# Patient Record
Sex: MALE | Race: WHITE | HISPANIC OR LATINO | Employment: FULL TIME | URBAN - METROPOLITAN AREA
[De-identification: names, ages, dates, MRNs, and addresses within clinical notes are randomized per-mention and may not be internally consistent; named-entity substitution may affect disease eponyms.]

---

## 2017-02-21 ENCOUNTER — ALLSCRIPTS OFFICE VISIT (OUTPATIENT)
Dept: OTHER | Facility: OTHER | Age: 52
End: 2017-02-21

## 2017-03-09 ENCOUNTER — ALLSCRIPTS OFFICE VISIT (OUTPATIENT)
Dept: OTHER | Facility: OTHER | Age: 52
End: 2017-03-09

## 2017-04-26 ENCOUNTER — ALLSCRIPTS OFFICE VISIT (OUTPATIENT)
Dept: OTHER | Facility: OTHER | Age: 52
End: 2017-04-26

## 2017-04-26 DIAGNOSIS — E11.65 TYPE 2 DIABETES MELLITUS WITH HYPERGLYCEMIA (HCC): ICD-10-CM

## 2017-05-10 ENCOUNTER — LAB CONVERSION - ENCOUNTER (OUTPATIENT)
Dept: OTHER | Facility: OTHER | Age: 52
End: 2017-05-10

## 2017-05-10 LAB
CREATININE, RANDOM URINE (HISTORICAL): 215 MG/DL (ref 20–370)
HBA1C MFR BLD HPLC: 5.8 % OF TOTAL HGB
MAGNESIUM, UR (HISTORICAL): 1.4 MG/DL
MICROALBUMIN/CREATININE RATIO (HISTORICAL): 7 MCG/MG CREAT

## 2017-05-11 ENCOUNTER — GENERIC CONVERSION - ENCOUNTER (OUTPATIENT)
Dept: OTHER | Facility: OTHER | Age: 52
End: 2017-05-11

## 2017-05-31 ENCOUNTER — GENERIC CONVERSION - ENCOUNTER (OUTPATIENT)
Dept: OTHER | Facility: OTHER | Age: 52
End: 2017-05-31

## 2017-06-20 ENCOUNTER — ALLSCRIPTS OFFICE VISIT (OUTPATIENT)
Dept: OTHER | Facility: OTHER | Age: 52
End: 2017-06-20

## 2017-06-20 DIAGNOSIS — M25.561 PAIN IN RIGHT KNEE: ICD-10-CM

## 2017-06-22 ENCOUNTER — HOSPITAL ENCOUNTER (OUTPATIENT)
Dept: RADIOLOGY | Facility: HOSPITAL | Age: 52
Discharge: HOME | End: 2017-06-22
Attending: PHYSICIAN ASSISTANT
Payer: COMMERCIAL

## 2017-06-22 DIAGNOSIS — M25.561 PAIN IN RIGHT KNEE: ICD-10-CM

## 2017-06-22 PROCEDURE — 76882 US LMTD JT/FCL EVL NVASC XTR: CPT

## 2017-07-19 ENCOUNTER — ALLSCRIPTS OFFICE VISIT (OUTPATIENT)
Dept: OTHER | Facility: OTHER | Age: 52
End: 2017-07-19

## 2017-08-22 ENCOUNTER — ALLSCRIPTS OFFICE VISIT (OUTPATIENT)
Dept: OTHER | Facility: OTHER | Age: 52
End: 2017-08-22

## 2017-08-25 DIAGNOSIS — M25.861 OTHER SPECIFIED JOINT DISORDERS, RIGHT KNEE: ICD-10-CM

## 2017-08-28 ENCOUNTER — TRANSCRIBE ORDERS (OUTPATIENT)
Dept: ADMINISTRATIVE | Facility: HOSPITAL | Age: 52
End: 2017-08-28

## 2017-08-28 DIAGNOSIS — R22.41 MASS OF KNEE, RIGHT: Primary | ICD-10-CM

## 2017-09-11 ENCOUNTER — HOSPITAL ENCOUNTER (OUTPATIENT)
Dept: RADIOLOGY | Facility: HOSPITAL | Age: 52
Discharge: HOME | End: 2017-09-11
Attending: ORTHOPAEDIC SURGERY
Payer: COMMERCIAL

## 2017-09-11 DIAGNOSIS — M25.861 OTHER SPECIFIED JOINT DISORDERS, RIGHT KNEE: ICD-10-CM

## 2017-09-11 PROCEDURE — A9585 GADOBUTROL INJECTION: HCPCS | Performed by: ORTHOPAEDIC SURGERY

## 2017-09-11 PROCEDURE — 73723 MRI JOINT LWR EXTR W/O&W/DYE: CPT

## 2017-09-11 RX ADMIN — GADOBUTROL 10 ML: 604.72 INJECTION INTRAVENOUS at 15:47

## 2017-10-05 ENCOUNTER — ALLSCRIPTS OFFICE VISIT (OUTPATIENT)
Dept: OTHER | Facility: OTHER | Age: 52
End: 2017-10-05

## 2017-10-05 DIAGNOSIS — E78.5 HYPERLIPIDEMIA: ICD-10-CM

## 2017-10-05 DIAGNOSIS — E11.65 TYPE 2 DIABETES MELLITUS WITH HYPERGLYCEMIA (HCC): ICD-10-CM

## 2017-10-06 NOTE — PROGRESS NOTES
Assessment  1  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) (M19 049)   2  Trigger finger, unspecified finger, unspecified laterality (727 03) (M65 30)   3  Knee mass, right (719 66) (M25 861)    Plan  Controlled diabetes mellitus, Hyperlipidemia    · (1) BASIC METABOLIC PROFILE; Status:Active; Requested [id number];    · (1) HEMOGLOBIN A1C; Status:Active; Requested UMB:31VSO9426;    · (1) LIPID PANEL FASTING W DIRECT LDL REFLEX; Status:Active; Requested  CXY:69ILF8717;   OA (osteoarthritis) of finger    · Meloxicam 7 5 MG Oral Tablet; TAKE 1 TABLET TWICE DAILY WITH FOOD  Trigger finger, unspecified finger, unspecified laterality    · 1 - Pelon Guadarrama MD  (Orthopedic Surgery) Co-Management  *  Status: Active   Requested for: 88FJE5343  Care Summary provided  : Yes    Discussion/Summary    1  Osteoarthritis of finger b/lwith patient that he has both arthritis and trigger finger that exacerbate his painNaproxen to Meloxicam 7 5mg BID with food  Trigger fingerto follow up with hand specialist Dr Gloria Jacobs for possible injection vs surgical release, appreciate their recs  Knee massresults discussed with patient, advised to follow up orthopedics to further evaluate, appreciate their recs  The patient was counseled regarding instructions for management,-risk factor reductions,-patient and family education,-impressions,-risks and benefits of treatment options,-importance of compliance with treatment  Possible side effects of new medications were reviewed with the patient/guardian today  The treatment plan was reviewed with the patient/guardian  The patient/guardian understands and agrees with the treatment plan     Self Referrals: No      Chief Complaint  Patient complain of pain in both hands level 9/10would like the flu vac today      History of Present Illness  HPI: Patient is a pleasant 47 yo M presenting for follow up knee MRI and to discuss b/l hand stiffness and pain   Patient's hand pain and stiffness started in 2010, at which time he received steroid injection  He saw hand specialist Dr Monico Cain in March and was advised to get trigger finger release  He was unable to schedule surgery 2/2 issues with getting workman's compensation to cover the surgery and recovery  Patient is interested in repeating the steroid injection  His hand stiffness and pain is worse in the morning; sometimes wakes him up from pain  He takes Naproxen every few days for the pain  He notes the pain is there most of the time, but gets worse when his finger gets stuck  Review of Systems    Constitutional: no fever-and-no chills  Respiratory: no shortness of breath,-no cough,-no wheezing-and-no shortness of breath during exertion  Gastrointestinal: no abdominal pain,-no nausea,-no constipation-and-no diarrhea  Musculoskeletal: arthralgias-and-joint stiffness, but-as noted in HPI  Integumentary: no rashes-and-no skin lesions  Neurological: no headache,-no numbness,-no dizziness-and-no fainting  ROS reviewed  Active Problems  1  Aftercare following surgery (V58 89) (Z48 89)   2  Controlled diabetes mellitus (250 00) (E11 9)   3  Effusion of left knee (719 06) (M25 462)   4  Effusion of left prepatellar bursa (719 06) (M25 462)   5  Encounter for immunization (V03 89) (Z23)   6  Encounter for screening colonoscopy (V76 51) (Z12 11)   7  Hamstring strain, right, initial encounter   8  Hyperlipidemia (272 4) (E78 5)   9  Knee mass, right (719 66) (M25 861)   10  Localized primary osteoarthritis of lower leg, left (715 16) (M17 12)   11  Low back pain (724 2) (M54 5)   12  Lumbar radiculopathy (724 4) (M54 16)   13  Mouth lesion (528 9) (K13 70)   14  Need for Tdap vaccination (V06 1) (Z23)   15  Obesity (278 00) (E66 9)   16  Pain in left knee (719 46) (M25 562)   17  Painful knee (719 46) (M25 569)   18  Poison ivy dermatitis (692 6) (L23 7)   19  Posterior knee pain, right (719 46) (M25 561)   20   Seasonal allergies (477 9) (J30 2)   21  Severe sleep apnea (780 57) (G47 30)   22  Snoring (786 09) (R06 83)   23  Tear of lateral meniscus of knee, left, initial encounter   24  Tear of medial meniscus of left knee, subsequent encounter (V58 89,836 0) (S83 242D)   25  Trigger finger, unspecified finger, unspecified laterality (727 03) (M65 30)   26  Trigger index finger of left hand (727 03) (M65 322)   27  Trigger index finger of right hand (727 03) (U09 820)    Past Medical History  Active Problems And Past Medical History Reviewed: The active problems and past medical history were reviewed and updated today  Family History  Mother    1  Family history of diabetes mellitus (V18 0) (Z83 3)   2  Family history of kidney disease (V18 69) (Z84 1)  Father    3  Family history of diabetes mellitus (V18 0) (Z83 3)  Family History Reviewed: The family history was reviewed and updated today  Social History   · Denied: History of Drug use   · Full-time employment   · [employer]   ·    · Never a smoker   · Non-smoker (V49 89) (Z78 9)   · Denied: History of Sexual history   · Social alcohol use (Z78 9)  The social history was reviewed and updated today  Surgical History  1  History of Femur Repair   2  History of Hernia Repair   3  History of Knee Arthroscopy With Medial Meniscus Repair  Surgical History Reviewed: The surgical history was reviewed and updated today  Current Meds   1  Allegra CAPS; Therapy: (Recorded:08Ibf4631) to Recorded   2  Atorvastatin Calcium 20 MG Oral Tablet; take one tablet at bedtime; Therapy: 05MRQ8105 to (Evaluate:14Oct2017)  Requested for: 44Gxk9210; Last   Rx:52Sam1375 Ordered   3  Cyclobenzaprine HCl - 5 MG Oral Tablet; Take one tablet three times daily as needed for   pain; Therapy: 86FBF3381 to (Last Rx:17Aug2017)  Requested for: 17Aug2017 Ordered   4  Fluticasone Propionate 50 MCG/ACT Nasal Suspension; USE 2 SPRAYS IN EACH   NOSTRIL ONCE DAILY;    Therapy: 09WCA0251 to Recorded   5  MetFORMIN HCl - 500 MG Oral Tablet; take 1 tablet once daily with a meal;   Therapy: 29Nnr8473 to (Evaluate:12Jan2018)  Requested for: 11Aea6956; Last   Rx:53Rsf1818 Ordered   6  Naproxen 500 MG Oral Tablet; TAKE 1 TABLET EVERY 12 HOURS WITH FOOD; Therapy: 64IPG8306 to (96 063555)  Requested for: 29OPH7147; Last   Rx:09Ykw7551 Ordered    The medication list was reviewed and updated today  Allergies  1  No Known Drug Allergies    Vitals   Recorded: 19UDE4817 08:11AM   Temperature 97 8 F   Heart Rate 84   Respiration 18   Systolic 951   Diastolic 70   Height 5 ft 6 93 in   Weight 191 lb    BMI Calculated 29 98   BSA Calculated 1 98   O2 Saturation 98   Pain Scale 9     Physical Exam    Constitutional   General appearance: No acute distress, well appearing and well nourished  Eyes   Conjunctiva and lids: No swelling, erythema, or discharge  Ears, Nose, Mouth, and Throat   External inspection of ears and nose: Normal     Pulmonary   Respiratory effort: No increased work of breathing or signs of respiratory distress  Auscultation of lungs: Clear to auscultation, equal breath sounds bilaterally, no wheezes, no rales, no rhonci  Cardiovascular   Auscultation of heart: Normal rate and rhythm, normal S1 and S2, without murmurs  Examination of extremities for edema and/or varicosities: Normal     Musculoskeletal   Gait and station: Normal     Inspection/palpation of joints, bones, and muscles: Abnormal  -wrist/hand: reduced ROM with flexion of digits b/l  Decreased  strength  PIP joints enlarged b/l  Intermittent locking of index fingers b/l  Psychiatric   Orientation to person, place and time: Normal     Mood and affect: Normal          Results/Data  * MRI KNEE RIGHT W WO CONTRAST 11Sep2017 02:35PM Hakeem Francois Order Number: [id number]   Performing Comments: for evaluation of posterior knee mass     Please also perform intervention for treatment of mass as warranted  Thank you   - Patient Instructions: 8/31 @ 8am @ [facility]     Test Name Result Flag Reference   MRI KNEE RIGHT W WO CONTRAST (Report)     MRI RIGHT KNEE WITH AND WITHOUT CONTRAST     INDICATION: M25 861: Other specified joint disorders, right knee  History taken directly from the electronic ordering system  Evaluate posterior knee mass  Patient has posterior knee pain and difficulty bending his knee  COMPARISON: Right knee ultrasound from 6/22/2017  Right knee plain films from 7/14/2015  TECHNIQUE:  MR sequences were obtained of the right knee including: Precontrast images: Localizer, axial T2 fat sat/T1 fat sat, coronal T1/STIR, sagittal PD/T2 fat sat  Postcontrast: Axial T1 fat sat, sagittal T1 fat sat Images were acquired on a 1 5    Rosio unit  IV Contrast: 10 mL of gadobutrol injection (MULTI-DOSE)      FINDINGS:     SUBCUTANEOUS TISSUES: Normal     JOINT EFFUSION: None  BAKER'S CYST: None  MENISCI: Intact  CRUCIATE LIGAMENTS: The complex cystic mass seen on prior ultrasound is shown to be a 3 1 x 2 0 x 2 7 cm ACL cyst arising from the proximal aspect of the ligament, projecting posteriorly to abut the popliteal neurovascular bundle  (Series 4 images 10    through 16 ) The anterior cruciate ligament is intact  The posterior cruciate ligament is also intact  EXTENSOR APPARATUS: Intact  COLLATERAL LIGAMENTS: Intact  ARTICULAR SURFACES: Intact  BONE MARROW: Normal signal without fracture  There is susceptibility artifact in the distal femoral diaphysis from patient's intramedullary nail  MUSCULATURE: Intact  IMPRESSION:     The complex cystic mass seen on prior ultrasound is shown to be a 3 1 x 2 0 x 2 7 cm ACL cyst arising from the proximal aspect of the ligament, projecting posteriorly to abut the popliteal neurovascular bundle   (Series 4 images 10 through 16 )      This likely accounts for patient's sensation of posterior knee mass, and difficulty flexing the knee  Based on its complex appearance on prior ultrasound, this is unlikely to be easily treated by needle aspiration  Workstation performed: [id number]     Signed by:   Heather Terrazas MD   9/11/17     Attending Note  Attending Note [facility]: Attending Note: I interviewed, took the history and examined the patient,-I supervised the Resident-and-I agree with the Resident management plan as it was presented to me  Level of Participation: I was present in clinic and examined the patient  Patient's History: 45 y/o male presenting for persistent hand pain and stiffness, with 2nd finger locking also starting with 3rd finger locking B/L  Uses naproxen with relieve but upsets his stomach interested in Ortho intervention for trigger finger and different medicine for pain  Key Parts of the Exam: as per resident  I agree with the Resident's note  Future Appointments    Date/Time Provider Specialty Site   11/01/2017 06:30 PM ARMOND Wright 476   10/25/2017 01:30 PM ARMOND Whyte   Orthopedic Surgery [address]     Signatures   Electronically signed by : Malachi Norwood DO; Oct  5 2017 10:28AM EST                       (Author)    Electronically signed by : ARMOND Becker ; Oct  5 2017 10:43AM EST                       (Author)

## 2017-10-25 ENCOUNTER — ALLSCRIPTS OFFICE VISIT (OUTPATIENT)
Dept: OTHER | Facility: OTHER | Age: 52
End: 2017-10-25

## 2017-10-26 NOTE — PROGRESS NOTES
Assessment  1  Trigger finger, unspecified finger, unspecified laterality (727 03) (M65 30)    Plan  Trigger finger, unspecified finger, unspecified laterality    · Follow Up After Surgery Evaluation and Treatment  Follow-up  Status: Hold For -  Scheduling  Requested for: 43QKE1505   · Please refer to the patient information sheet that was provided at your office visit today for  information on  your current condition ; Status:Complete;   Done: 53DUF5861 02:26PM    Discussion/Summary    Assessment: Bilateral trigger fingers index, long, ring, and small fingers  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  this point, patient wishes surgical release of the left hand index and small finger  Patient would like cortisone injections to the long and ring  We will observe the right hand  anatomy and physiology of trigger finger was discussed with the patient today in the office  Edema and increased contact pressure within the flexor tendons at the A1 pulley can cause pain, crepitation, and limitation of function  Treatment options include resting MP blocking splints to decrease edema, oral anti-inflammatory medications, home or formal therapy exercises, up to 2 steroid injections or surgical release  While majority of patients do respond to conservative treatment, up to 20% may require surgical release  The patient has elected release of the left hand index and small finger trigger finger with injection into the left hand long and ring finger trigger finger  patient has elected to undergo a release of the A1 pulley (trigger finger)  A small incision will be made over the palmar aspect of the hand, the tendon sheath holding the flexor tendons will be released  In the postoperative period, light activities are allowed immediately, driving is allowed when narcotic medication has stopped, and the incision may get wet after 2 days   Heavy activities (lifting more than approximately 10 pounds) will be allowed after the follow up appointment in 1-2 weeks  While the pain and discomfort within the wrist typically improves rapidly, some residual discomfort may be present for up to 6 weeks  The nodule that is typically palpable in the palmar aspect of the hand will not be removed, as this would necessitate removal of a portion of the flexor tendon, however the catching, clicking, and locking should resolve  Approximate success rate is 98%  risks and benefits of the procedure were explained to the patient, which include, but are not limited to: Bleeding, infection, recurrence, pain, scar, damage to tendons, damage to nerves, and damage to blood vessels, need for future surgery and complications related to anesthesia  If bony work is done, risks also include malunion and nonunion  These risks, along with alternative conservative treatment options, and postoperative protocols were voiced back and understood by the patient  All questions were answered to the patient's satisfaction  The patient agrees to comply with a standard postoperative protocol, and is willing to proceed  Education was provided via written and auditory forms  There were no barriers to learning  Written handouts regarding wound care, incision and scar care, and general preoperative information, as well as risks and benefits were provided to the patient  note was dictated with dictation software  As such, and may contain typographical errors, improperly dictated words, background noise, or other errors  patient presents evaluation bilateral hand trigger fingers index, long, ring, and small fingers well as PIP joint arthritis  Mild to moderate pain with catching popping locking aching in nature without numbness tingling fevers chills been ongoing for months  past medical history, medications, allergies, and review of systems have been read and reviewed on the chart and have been updated     of Systems:NegativeNegative except as aboveAs aboveNegative except as aboveNegative    / Psych: Awake and Oriented, No acute distress, age appropriateNormocephalic, atraumatic, mucous membranes moist, neck supple, trachea midline  No rebound or signs of guardingNo discernible arrhythmia, 2+ pulses with good capillary refillNo audible wheezing or audible stridor[The patient is neurovascularly intact in the median, ulnar, and radial nerve distribution  There is normal sensation and good capillary refill within the digits  2+ pulses  ][No lesions, rashes, streaking, purulence, abscesses, lymphangitis]Tenderness to palpation PIP joints index through small bilaterally, catching popping clicking locking bilateral hand index through small finger  No instability, full range of motion  Studies: [none]     Chief Complaint  1  Finger Problem    Active Problems  1  Aftercare following surgery (V58 89) (Z48 89)   2  Controlled diabetes mellitus (250 00) (E11 9)   3  Effusion of left knee (719 06) (M25 462)   4  Effusion of left prepatellar bursa (719 06) (M25 462)   5  Encounter for immunization (V03 89) (Z23)   6  Encounter for screening colonoscopy (V76 51) (Z12 11)   7  Flu vaccine need (V04 81) (Z23)   8  Hamstring strain, right, initial encounter   9  Hyperlipidemia (272 4) (E78 5)   10  Knee mass, right (719 66) (M25 861)   11  Localized primary osteoarthritis of lower leg, left (715 16) (M17 12)   12  Low back pain (724 2) (M54 5)   13  Lumbar radiculopathy (724 4) (M54 16)   14  Mouth lesion (528 9) (K13 70)   15  Need for Tdap vaccination (V06 1) (Z23)   16  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) (M19 049)   17  Obesity (278 00) (E66 9)   18  Pain in left knee (719 46) (M25 562)   19  Painful knee (719 46) (M25 569)   20  Poison ivy dermatitis (692 6) (L23 7)   21  Posterior knee pain, right (719 46) (M25 561)   22  Seasonal allergies (477 9) (J30 2)   23  Severe sleep apnea (780 57) (G47 30)   24  Snoring (786 09) (R06 83)   25   Tear of lateral meniscus of knee, left, initial encounter 26  Tear of medial meniscus of left knee, subsequent encounter (V58 89,836 0) (S83 242D)   27  Trigger finger, unspecified finger, unspecified laterality (727 03) (M65 30)   28  Trigger index finger of left hand (727 03) (M65 322)   29  Trigger index finger of right hand (727 03) (M65 321)    Past Medical History   · History of influenza vaccination (V49 89) (Z92 29)    Surgical History   · History of Femur Repair   · History of Hernia Repair   · History of Knee Arthroscopy With Medial Meniscus Repair    Family History   · Family history of diabetes mellitus (V18 0) (Z83 3)   · Family history of kidney disease (V18 69) (Z84 1)   · Family history of diabetes mellitus (V18 0) (Z83 3)    Social History   · Denied: History of Drug use   · Full-time employment   · [employer]   ·    · Never a smoker   · Non-smoker (V49 89) (Z78 9)   · Denied: History of Sexual history   · Social alcohol use (Z78 9)    Current Meds   1  Allegra CAPS; Therapy: (Recorded:16Qlo8945) to Recorded   2  Atorvastatin Calcium 20 MG Oral Tablet; take one tablet at bedtime; Therapy: 12PTI2560 to (Evaluate:14Oct2017)  Requested for: 88Ubr9989; Last   Rx:54Jpg6100 Ordered   3  Cyclobenzaprine HCl - 5 MG Oral Tablet; Take one tablet three times daily as needed for   pain; Therapy: 95TIK5360 to (Last Rx:17Aug2017)  Requested for: 55Zpz3489 Ordered   4  Fluticasone Propionate 50 MCG/ACT Nasal Suspension; USE 2 SPRAYS IN EACH   NOSTRIL ONCE DAILY; Therapy: 95PNL1822 to Recorded   5  Meloxicam 7 5 MG Oral Tablet; TAKE 1 TABLET TWICE DAILY WITH FOOD; Therapy: 19OIH2862 to (Neel Gloss)  Requested for: 61IJA2476; Last   Rx:96Nrs1608 Ordered   6  MetFORMIN HCl - 500 MG Oral Tablet; take 1 tablet once daily with a meal;   Therapy: 60Hso2830 to (Evaluate:12Jan2018)  Requested for: 64Xte6828; Last   Rx:84Xxj5038 Ordered   7  Naproxen 500 MG Oral Tablet; TAKE 1 TABLET EVERY 12 HOURS WITH FOOD;    Therapy: 89NCZ5214 to (77 873 135)  Requested for: 54HVN9654; Last   Rx:41Wpc4227 Ordered    Allergies  1  No Known Drug Allergies    Vitals  Signs   Heart Rate: 82  Systolic: 295  Diastolic: 90  Height: 5 ft 6 in  Weight: 191 lb 2 oz  BMI Calculated: 30 85  BSA Calculated: 1 96    Future Appointments    Date/Time Provider Specialty Site   11/01/2017 06:30 PM ARMOND Haq Josy Phelanques [facility]     Surgery Scheduling Form  Hand-Wrist-Forearm-Elbow Surgery Scheduling Form [facility]:   Location:   Confirmation Number:   Procedure Date:   Requested Time:   Surgeon: Dr Amara Dejesus   Co-Surgeon:   Bed: Out Patient - No Bed Required   Anesthesia: general-- (Sedation and local)     PROCEDURE DETAILS   Procedure: Trigger Finger Release 15814 / 727 03-- (Index and small finger left hand with injection of the long and ring finger left hand)   Location/Laterality:   Anticipated frozen section:   Procedure Codes:   Pre-op diagnosis:   Diagnosis Code(s):   Case Length: 40    Equipment: Soft Tissue Set   Equipment Needs:   Implants:   Is the patient able to walk up a flight of stairs, walk up a hill or do heavy housework WITHOUT having chest pain or shortness of breath?  YES      REGISTRATION & FINANCIAL CLEARANCE   FA Initials:   Insurance:   Policy Number: Group Number:     PRE-ADMISSION TESTING/CLINICAL INFORMATION   PAT Location:     CONSULTS NEEDED:   Anesthesia Consult: No Anesthesia consult needed   Medical Consult: No Medical consult needed   Cardiac Consult: No Cardiac consult needed   Other Consult: No Other consult needed      ALLERGIES AND ALERTS   Latex Allergy: NO   Penicillin Allergy: NO   Malignant Hyperthermia: NO   Diabetic Patient: NO     CASE MANAGEMENT:     COMMENTS     Scheduling Information Provided By:      Signatures   Electronically signed by : ARMOND Elizabeth ; Oct 25 2017  2:27PM EST                       (Author)

## 2017-11-07 ENCOUNTER — LAB CONVERSION - ENCOUNTER (OUTPATIENT)
Dept: OTHER | Facility: OTHER | Age: 52
End: 2017-11-07

## 2017-11-07 LAB
BUN SERPL-MCNC: 14 MG/DL (ref 7–25)
BUN/CREA RATIO (HISTORICAL): ABNORMAL (CALC) (ref 6–22)
CALCIUM SERPL-MCNC: 9.6 MG/DL (ref 8.6–10.3)
CHLORIDE SERPL-SCNC: 102 MMOL/L (ref 98–110)
CHOLEST SERPL-MCNC: 194 MG/DL
CHOLEST/HDLC SERPL: 5 (CALC)
CO2 SERPL-SCNC: 30 MMOL/L (ref 20–31)
CREAT SERPL-MCNC: 0.79 MG/DL (ref 0.7–1.33)
EGFR AFRICAN AMERICAN (HISTORICAL): 119 ML/MIN/1.73M2
EGFR-AMERICAN CALC (HISTORICAL): 103 ML/MIN/1.73M2
GLUCOSE (HISTORICAL): 107 MG/DL (ref 65–99)
HBA1C MFR BLD HPLC: 5.8 % OF TOTAL HGB
HDLC SERPL-MCNC: 39 MG/DL
LDL CHOLESTEROL (HISTORICAL): 134 MG/DL (CALC)
NON-HDL-CHOL (CHOL-HDL) (HISTORICAL): 155 MG/DL (CALC)
POTASSIUM SERPL-SCNC: 4.3 MMOL/L (ref 3.5–5.3)
SODIUM SERPL-SCNC: 139 MMOL/L (ref 135–146)
TRIGL SERPL-MCNC: 99 MG/DL

## 2017-11-09 ENCOUNTER — ALLSCRIPTS OFFICE VISIT (OUTPATIENT)
Dept: OTHER | Facility: OTHER | Age: 52
End: 2017-11-09

## 2017-11-09 DIAGNOSIS — E66.9 OBESITY: ICD-10-CM

## 2017-11-09 DIAGNOSIS — E78.5 HYPERLIPIDEMIA: ICD-10-CM

## 2017-11-09 DIAGNOSIS — E11.9 TYPE 2 DIABETES MELLITUS WITHOUT COMPLICATIONS (HCC): ICD-10-CM

## 2017-11-20 ENCOUNTER — GENERIC CONVERSION - ENCOUNTER (OUTPATIENT)
Dept: OTHER | Facility: OTHER | Age: 52
End: 2017-11-20

## 2017-11-28 ENCOUNTER — ALLSCRIPTS OFFICE VISIT (OUTPATIENT)
Dept: OTHER | Facility: OTHER | Age: 52
End: 2017-11-28

## 2017-11-30 NOTE — PROGRESS NOTES
Assessment    1  Knee mass, right (719 66) (M25 861)    Plan  Knee mass, right    · Follow-up visit in 3 months Evaluation and Treatment  Follow-up  Status: Hold For -Scheduling  Requested for: 46AFW5876    Discussion/Summary  Right posterior aspect knee mass which is cystic in nature  This mass is anterior to the neurovascular structures and is intra-articular presenting as this cyst from the ACL  Plan is as follows:  Weightbearing as tolerated  We will discussed plan with one of my sports orthopaedic sub-specialists  Continue current activity level  Discussed treatment plan with patient he is in agreement treatment plan  Thank you      Chief Complaint  1  Knee Pain    History of Present Illness  HPI: 23-year-old male who presents at this time secondary to continue posterior aspect knee pain  Patient states that he has difficulty going up steps due to limited flexion  Denies any numbness tingling fevers chills  Denies any anterior aspect knee pain      Review of Systems    ROS reviewed  Active Problems  1  Aftercare following surgery (V58 89) (Z48 89)   2  Controlled diabetes mellitus (250 00) (E11 9)   3  Effusion of left knee (719 06) (M25 462)   4  Effusion of left prepatellar bursa (719 06) (M25 462)   5  Encounter for immunization (V03 89) (Z23)   6  Encounter for screening colonoscopy (V76 51) (Z12 11)   7  Flu vaccine need (V04 81) (Z23)   8  Hamstring strain, right, initial encounter   9  Hyperlipidemia (272 4) (E78 5)   10  Knee mass, right (719 66) (M25 861)   11  Localized primary osteoarthritis of lower leg, left (715 16) (M17 12)   12  Low back pain (724 2) (M54 5)   13  Lumbar radiculopathy (724 4) (M54 16)   14  Mouth lesion (528 9) (K13 70)   15  Need for Tdap vaccination (V06 1) (Z23)   16  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) (M19 049)   17  Obesity (278 00) (E66 9)   18  Pain in left knee (719 46) (M25 562)   19  Painful knee (719 46) (M25 569)   20   Posterior knee pain, right (719 46) (M25 561)   21  Seasonal allergies (477 9) (J30 2)   22  Severe sleep apnea (780 57) (G47 30)   23  Snoring (786 09) (R06 83)   24  Tear of lateral meniscus of knee, left, initial encounter   25  Tear of medial meniscus of left knee, subsequent encounter (V58 89,836 0) (S83 242D)   26  Trigger finger, unspecified finger, unspecified laterality (727 03) (M65 30)   27  Trigger index finger of left hand (727 03) (M65 322)   28  Trigger index finger of right hand (727 03) (M65 321)    Past Medical History   · History of influenza vaccination (V49 89) (Z92 29)    The active problems and past medical history were reviewed and updated today  Surgical History   · History of Femur Repair   · History of Hernia Repair   · History of Knee Arthroscopy With Medial Meniscus Repair    The surgical history was reviewed and updated today  Family History  Mother    · Family history of diabetes mellitus (V18 0) (Z83 3)   · Family history of kidney disease (V18 69) (Z80 3)  Father    · Family history of diabetes mellitus (V18 0) (Z83 3)    The family history was reviewed and updated today  Social History     · Denied: History of Drug use   · Full-time employment   · [employer]   ·    · Never a smoker   · Non-smoker (V49 89) (Z78 9)   · Denied: History of Sexual history   · Social alcohol use (Z78 9)  The social history was reviewed and updated today  Current Meds   1  Allegra CAPS; Therapy: (Recorded:14Sxb8959) to Recorded   2  Atorvastatin Calcium 20 MG Oral Tablet; take one tablet at bedtime; Therapy: 05JAP5094 to (Evaluate:14Oct2017)  Requested for: 17Glr0721; Last Rx:39Vdm7380 Ordered   3  Cyclobenzaprine HCl - 5 MG Oral Tablet; Take one tablet three times daily as needed for pain; Therapy: 31MDM0037 to (Last Rx:17Aug2017)  Requested for: 17Aug2017 Ordered   4  Fluticasone Propionate 50 MCG/ACT Nasal Suspension; USE 2 SPRAYS IN EACH NOSTRIL ONCE DAILY;  Therapy: 91KLB0982 to (Last CK:72SUG8041) Requested for: 89LOB3700 Ordered   5  Meloxicam 7 5 MG Oral Tablet; take 1 tablet twice daily with food; Therapy: 46WBZ0860 to (Evaluate:70Str5235)  Requested for: 65BHK9056; Last Rx:76Bzi7309 Ordered   6  MetFORMIN HCl - 500 MG Oral Tablet; take 1 tablet once daily with a meal; Therapy: 29Leb5600 to (Evaluate:12Jan2018)  Requested for: 52Fxl1585; Last Rx:26Rvs4300 Ordered   7  Naproxen 500 MG Oral Tablet; TAKE 1 TABLET EVERY 12 HOURS WITH FOOD; Therapy: 84YWJ0302 to (21 )  Requested for: 76LSM9500; Last Rx:09Cui2146 Ordered    The medication list was reviewed and updated today  Allergies  1  No Known Drug Allergies    Vitals  Signs     Heart Rate: 84  Respiration: 18  Systolic: 122  Diastolic: 88  Weight: 439 lb 6 08 oz    Physical Exam  Right knee:  No abrasions, no open wounds, no palpable mass posteriorly, pain with flexion of the knee passively past 100Â°, no paresthesias noted by the patient with attempted deep flexion, patient neurologic and vascularly intact distally  Constitutional - General appearance: Normal   Musculoskeletal - Gait and station: Abnormal -- Digits and nails: Normal -- Muscle strength/tone: Normal -- Lower extremity compartments: Normal   Cardiovascular - Pulses: Normal -- Examination of extremities for edema and/or varicosities: Normal   Skin - Skin and subcutaneous tissue: Normal   Neurologic - Sensation: Normal   Psychiatric - Orientation to person, place, and time: Normal -- Mood and affect: Normal       Future Appointments    Date/Time Provider Specialty Site   05/09/2018 05:00 PM ARMOND Orozco [address]   02/14/2018 03:00 PM Issac Daniels [address] [facility] [address]   02/06/2018 08:30 AM Gerrianne Crigler, M D   [address]       Signatures   Electronically signed by : ARMOND Acosta ; Nov 29 2017  7:07AM EST                       (Author)

## 2017-12-12 ENCOUNTER — ALLSCRIPTS OFFICE VISIT (OUTPATIENT)
Dept: OTHER | Facility: OTHER | Age: 52
End: 2017-12-12

## 2017-12-13 NOTE — PROGRESS NOTES
Assessment  1  Knee mass, right (719 66) (M25 861)    Plan  Knee mass, right    · Continue with our present treatment plan ; Status:Complete - RetrospectiveAuthorization;   Done: 82ZEJ9911   · Follow Up After Surgery Evaluation and Treatment  Follow-up  Status: Complete -Retrospective Authorization  Done: 14BOD2180    Discussion/Summary    R knee cruciate cyst-treatment options were reviewed with the patient-he wishes to proceed with arthroscopic decompression of right acl cyst-risks and benefits were reviewed-consent was signed-f/u after surgeryScribe attestation:I, Modesta Alcazar, LAT, ATC, am acting as a scribe while in the presence of the attending physician  Chief Complaint  1  Knee Pain    History of Present Illness  HPI: Pt is a 45year old male who is here as a referral from Dr Eddi Chen for a R knee mass  Pt had an MRI and US completed on Sept 11, 2017  Pt states that this pain has been ongoing for 5 years and is now affecting his ADLs and work  Pt works on cars  Pt has tried PT for this and had no relief of symptoms  Pt describes most of his pain is when he goes to stand up or is walking up stairs  Pt states that the pain wakes him up at night and feels the pain up in his hip down to his ankle  No numbness and tingling noted  The past medical history, medications, allergies, and review of systems have been read and reviewed on the chart and have been updated  Review of Systems:Constitutional: NegativeSkin: Negative except as aboveMusculoskeletal: As aboveNeurologic: Negative except as abovePsychiatric: Negative      Active Problems  1  Aftercare following surgery (V58 89) (Z48 89)  2  Controlled diabetes mellitus (250 00) (E11 9)  3  Effusion of left knee (719 06) (M25 462)  4  Effusion of left prepatellar bursa (719 06) (M25 462)  5  Encounter for immunization (V03 89) (Z23)  6  Encounter for screening colonoscopy (V76 51) (Z12 11)  7  Flu vaccine need (V04 81) (Z23)  8   Hamstring strain, right, initial encounter  9  Hyperlipidemia (272 4) (E78 5)  10  Knee mass, right (719 66) (M25 861)  11  Localized primary osteoarthritis of lower leg, left (715 16) (M17 12)  12  Low back pain (724 2) (M54 5)  13  Lumbar radiculopathy (724 4) (M54 16)  14  Mouth lesion (528 9) (K13 70)  15  Need for Tdap vaccination (V06 1) (Z23)  16  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) (M19 049)  17  Obesity (278 00) (E66 9)  18  Pain in left knee (719 46) (M25 562)  19  Painful knee (719 46) (M25 569)  20  Posterior knee pain, right (719 46) (M25 561)  21  Seasonal allergies (477 9) (J30 2)  22  Severe sleep apnea (780 57) (G47 30)  23  Snoring (786 09) (R06 83)  24  Tear of lateral meniscus of knee, left, initial encounter  25  Tear of medial meniscus of left knee, subsequent encounter (V58 89,836 0) (S83 242D)  26  Trigger finger, unspecified finger, unspecified laterality (727 03) (M65 30)  27  Trigger index finger of left hand (727 03) (M65 322)  28  Trigger index finger of right hand (727 03) (M65 321)    Past Medical History   · History of influenza vaccination (V49 89) (Z92 29)    Surgical History   · History of Femur Repair   · History of Hernia Repair   · History of Knee Arthroscopy With Medial Meniscus Repair    Family History  Mother    · Family history of diabetes mellitus (V18 0) (Z83 3)   · Family history of kidney disease (V22 75) (Z80 3)  Father    · Family history of diabetes mellitus (V18 0) (Z83 3)    Social History   · Denied: History of Drug use   · Full-time employment   · [employer]   ·    · Never a smoker   · Non-smoker (V49 89) (Z78 9)   · Denied: History of Sexual history   · Social alcohol use (Z78 9)    Current Meds  1  Allegra CAPS; Therapy: (Recorded:31Jqn7187) to Recorded  2  Atorvastatin Calcium 20 MG Oral Tablet; take one tablet at bedtime; Therapy: 97PNM9343 to (Evaluate:02Ahj3767)  Requested for: 42Nwa6872; Last Rx:47Oxo4312 Ordered  3  Cyclobenzaprine HCl - 5 MG Oral Tablet;  Take one tablet three times daily as needed for pain; Therapy: 33TNZ0583 to (Last Rx:17Aug2017)  Requested for: 71Yhi8810 Ordered  4  Fluticasone Propionate 50 MCG/ACT Nasal Suspension; USE 2 SPRAYS IN EACH NOSTRIL ONCE DAILY; Therapy: 16FVL1644 to (Last Rx:09Nov2017)  Requested for: 71CWT2188 Ordered  5  Meloxicam 7 5 MG Oral Tablet; take 1 tablet twice daily with food; Therapy: 02WLU7315 to (Evaluate:06Dec2017)  Requested for: 76NLY1769; Last Rx:06Nov2017 Ordered  6  MetFORMIN HCl - 500 MG Oral Tablet; take 1 tablet once daily with a meal; Therapy: 20Acd8122 to (Evaluate:12Jan2018)  Requested for: 60Ndl2320; Last Rx:91Jdt5669 Ordered  7  Naproxen 500 MG Oral Tablet; TAKE 1 TABLET EVERY 12 HOURS WITH FOOD; Therapy: 64WFB7932 to [phone])  Requested for: 59PTB4570; Last Rx:14Spu9941 Ordered    Allergies  1  No Known Drug Allergies    Vitals   Recorded: 12Dec2017 02:03PM   Heart Rate 86   Systolic 535   Diastolic 71   Height 5 ft 7 in   Weight 192 lb 6 08 oz   BMI Calculated 30 13   BSA Calculated 1 99     Physical Exam    Right Knee: Appearance: Normal except  Tenderness: None except the  ROM: Full except as noted: Motor: Normal except as noted: Special Test: Negative except  (R knee 0-110  pt is not point tender upon palpation  palpable posterior mass  ligaments are stable  Negative Elsi)   Constitutional - General appearance: Normal   Musculoskeletal - Gait and station: Normal -- Digits and nails: Normal -- Muscle strength/tone: Normal   Cardiovascular - Pulses: Normal -- Examination of extremities for edema and/or varicosities: Normal -- Heart - RRR, no murmurs, no rubs, no gallops  Respiratory - Lungs - Clear to auscultation bilaterally, no rales, no rhonci, no wheezes    Skin - Skin and subcutaneous tissue: Normal   Neurologic - Sensation: Normal   Psychiatric - Orientation to person, place, and time: Normal -- Mood and affect: Normal   Eyes  Conjunctiva and lids: Normal    Pupils and irises: Normal  Results/Data  I personally reviewed the films/images/results in the office today  My interpretation follows  MRI Review Right knee: 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  Surgery Scheduling Form  Surgery Schedule Form [facility] Standard:  Location: [place]  Confirmation Number:2  [id number]    PROCEDURE DETAILS  Procedure Date:1 December 21, 20171   Requested Time:1  No Preference1   Surgeon: Dr Mosetta Scheuermann   Co-Surgeon:  DENIS PICKERING [facility] Required: No  Procedure: Right knee arthroscopy with decompression of acl cyst  Bed: Out Patient - No Bed Required  Laterality/Level: Right  Case Length: 60 min  Anticipated frozen section:  Anesthesia: Choice    Procedure Codes: 24727  Pre-op diagnosis:1  Right knee mass1   Diagnosis Code(s): M25 861   Equipment:  Equipment Needs: knee arthroscopy   Implants:    Is the patient able to walk up a flight of stairs, walk up a hill or do heavy housework WITHOUT having chest pain or shortness of breath? 1  YES1    REGISTRATION & FINANCIAL CLEARANCE1    Participant - NO1    FA Initials:  Insurance:Alvin Howe Number:1  [id number]  Group Number:  [id number] &/or Referral Number:2 December 18, 2017 1:40 pm Per Pearce Grandchild at [facility] No prior authorization needed2      PRE-ADMISSION TESTING/CLINICAL INFORMATION  PAT Location: [facility],-- No PATs Needed1        CONSULTS NEEDED:  Anesthesia Consult: YES, Anesthesia consult with   Medical Consult:1  No Medical consult needed1   Cardiac Consult:1  No Cardiac consult needed1   Other Consult:1  No Other consult needed1     ALLERGIES AND ALERTS    Latex Allergy: NO  Penicillin Allergy: NO  Malignant Hyperthermia: NO  Diabetic Patient: NO     ERAS Patient:   COMMENTS  Scheduling Information Provided By:Alvin Somers Jaswinder: [phone] F: [phone]      CASE MANAGEMENT:       1 Amended ByMichael Sarmiento; Dec 13 2017 9:49 AM EST   2 Amended By: Rupert Lucio; Dec 18 2017 1:42 PM EST    Future Appointments    Date/Time Provider Specialty Site   01/24/2018 05:00 PM ARMOND Mena [address]   05/09/2018 05:00 PM ARMOND Mena [address]   02/14/2018 03:00 PM Daxa Viera, [facility] [address]   12/21/2017 09:30 AM ARMOND Stewart  [facility]   12/29/2017 10:40 AM ARMOND Stewart  Orthopedic Surgery [facility]   02/06/2018 08:30 AM ARMOND Dan   [address] [facility],[address] [place] [address]   Electronically signed by : ARMOND Moran ; Dec 18 2017  7:34PM EST                       (Author)

## 2017-12-19 RX ORDER — LORATADINE 10 MG/1
10 TABLET ORAL DAILY
COMMUNITY
End: 2018-01-27 | Stop reason: SDUPTHER

## 2017-12-19 RX ORDER — NAPROXEN 500 MG/1
500 TABLET ORAL 2 TIMES DAILY PRN
Status: ON HOLD | COMMUNITY
Start: 2016-10-26 | End: 2018-02-06

## 2017-12-19 RX ORDER — ATORVASTATIN CALCIUM 20 MG/1
20 TABLET, FILM COATED ORAL DAILY
Status: ON HOLD | COMMUNITY
Start: 2017-04-17 | End: 2018-02-06

## 2017-12-19 NOTE — PRE-PROCEDURE INSTRUCTIONS
Pre-Surgery Instructions:   Medication Instructions    atorvastatin (LIPITOR) 20 mg tablet Instructed patient per Anesthesia Guidelines   fluticasone (FLONASE) 50 mcg/act nasal spray Instructed patient per Anesthesia Guidelines   loratadine (CLARITIN) 10 mg tablet Instructed patient per Anesthesia Guidelines   metFORMIN (GLUCOPHAGE) 500 mg tablet Instructed patient per Anesthesia Guidelines   naproxen (NAPROSYN) 500 mg tablet Patient was instructed by Physician and understands  Instructed to take fluticasone nasal spray if needed am of surgery  per anesthesia

## 2017-12-20 ENCOUNTER — ANESTHESIA EVENT (OUTPATIENT)
Dept: PERIOP | Facility: HOSPITAL | Age: 52
End: 2017-12-20
Payer: COMMERCIAL

## 2017-12-21 ENCOUNTER — ANESTHESIA (OUTPATIENT)
Dept: PERIOP | Facility: HOSPITAL | Age: 52
End: 2017-12-21
Payer: COMMERCIAL

## 2017-12-21 ENCOUNTER — HOSPITAL ENCOUNTER (OUTPATIENT)
Facility: HOSPITAL | Age: 52
Setting detail: OUTPATIENT SURGERY
Discharge: HOME | End: 2017-12-21
Attending: ORTHOPAEDIC SURGERY | Admitting: ORTHOPAEDIC SURGERY
Payer: COMMERCIAL

## 2017-12-21 VITALS
HEART RATE: 72 BPM | WEIGHT: 190 LBS | DIASTOLIC BLOOD PRESSURE: 64 MMHG | SYSTOLIC BLOOD PRESSURE: 107 MMHG | OXYGEN SATURATION: 97 % | HEIGHT: 67 IN | RESPIRATION RATE: 16 BRPM | TEMPERATURE: 97.8 F | BODY MASS INDEX: 29.82 KG/M2

## 2017-12-21 LAB — GLUCOSE SERPL-MCNC: 101 MG/DL (ref 65–140)

## 2017-12-21 PROCEDURE — 82948 REAGENT STRIP/BLOOD GLUCOSE: CPT

## 2017-12-21 RX ORDER — MIDAZOLAM HYDROCHLORIDE 1 MG/ML
INJECTION INTRAMUSCULAR; INTRAVENOUS AS NEEDED
Status: DISCONTINUED | OUTPATIENT
Start: 2017-12-21 | End: 2017-12-21 | Stop reason: SURG

## 2017-12-21 RX ORDER — PROPOFOL 10 MG/ML
INJECTION, EMULSION INTRAVENOUS AS NEEDED
Status: DISCONTINUED | OUTPATIENT
Start: 2017-12-21 | End: 2017-12-21 | Stop reason: SURG

## 2017-12-21 RX ORDER — OXYCODONE HYDROCHLORIDE 5 MG/1
5 TABLET ORAL EVERY 4 HOURS PRN
Qty: 40 TABLET | Refills: 0 | Status: SHIPPED | OUTPATIENT
Start: 2017-12-21 | End: 2017-12-31

## 2017-12-21 RX ORDER — MORPHINE SULFATE 10 MG/ML
2 INJECTION, SOLUTION INTRAMUSCULAR; INTRAVENOUS EVERY 4 HOURS PRN
Status: DISCONTINUED | OUTPATIENT
Start: 2017-12-21 | End: 2017-12-21 | Stop reason: HOSPADM

## 2017-12-21 RX ORDER — FENTANYL CITRATE 50 UG/ML
INJECTION, SOLUTION INTRAMUSCULAR; INTRAVENOUS AS NEEDED
Status: DISCONTINUED | OUTPATIENT
Start: 2017-12-21 | End: 2017-12-21 | Stop reason: SURG

## 2017-12-21 RX ORDER — ONDANSETRON 2 MG/ML
INJECTION INTRAMUSCULAR; INTRAVENOUS AS NEEDED
Status: DISCONTINUED | OUTPATIENT
Start: 2017-12-21 | End: 2017-12-21 | Stop reason: SURG

## 2017-12-21 RX ORDER — MEPERIDINE HYDROCHLORIDE 50 MG/ML
12.5 INJECTION INTRAMUSCULAR; INTRAVENOUS; SUBCUTANEOUS AS NEEDED
Status: DISCONTINUED | OUTPATIENT
Start: 2017-12-21 | End: 2017-12-21 | Stop reason: HOSPADM

## 2017-12-21 RX ORDER — NAPROXEN 500 MG/1
500 TABLET ORAL 2 TIMES DAILY WITH MEALS
Qty: 60 TABLET | Refills: 0 | Status: ON HOLD | OUTPATIENT
Start: 2017-12-21 | End: 2018-02-06

## 2017-12-21 RX ORDER — LIDOCAINE HYDROCHLORIDE 10 MG/ML
INJECTION, SOLUTION INFILTRATION; PERINEURAL AS NEEDED
Status: DISCONTINUED | OUTPATIENT
Start: 2017-12-21 | End: 2017-12-21 | Stop reason: SURG

## 2017-12-21 RX ORDER — SODIUM CHLORIDE 9 MG/ML
125 INJECTION, SOLUTION INTRAVENOUS CONTINUOUS
Status: DISCONTINUED | OUTPATIENT
Start: 2017-12-21 | End: 2017-12-21 | Stop reason: HOSPADM

## 2017-12-21 RX ORDER — OXYCODONE HYDROCHLORIDE AND ACETAMINOPHEN 5; 325 MG/1; MG/1
1 TABLET ORAL EVERY 4 HOURS PRN
Status: DISCONTINUED | OUTPATIENT
Start: 2017-12-21 | End: 2017-12-21 | Stop reason: HOSPADM

## 2017-12-21 RX ORDER — FENTANYL CITRATE/PF 50 MCG/ML
50 SYRINGE (ML) INJECTION
Status: DISCONTINUED | OUTPATIENT
Start: 2017-12-21 | End: 2017-12-21 | Stop reason: HOSPADM

## 2017-12-21 RX ORDER — PROMETHAZINE HYDROCHLORIDE 12.5 MG/1
12.5 TABLET ORAL EVERY 6 HOURS PRN
Qty: 30 TABLET | Refills: 0 | Status: ON HOLD | OUTPATIENT
Start: 2017-12-21 | End: 2018-02-06

## 2017-12-21 RX ORDER — OXYCODONE HYDROCHLORIDE AND ACETAMINOPHEN 5; 325 MG/1; MG/1
2 TABLET ORAL EVERY 4 HOURS PRN
Status: DISCONTINUED | OUTPATIENT
Start: 2017-12-21 | End: 2017-12-21 | Stop reason: HOSPADM

## 2017-12-21 RX ADMIN — LIDOCAINE HYDROCHLORIDE 100 MG: 10 INJECTION, SOLUTION INFILTRATION; PERINEURAL at 08:55

## 2017-12-21 RX ADMIN — SODIUM CHLORIDE 125 ML/HR: 0.9 INJECTION, SOLUTION INTRAVENOUS at 07:57

## 2017-12-21 RX ADMIN — FENTANYL CITRATE 50 MCG: 50 INJECTION INTRAMUSCULAR; INTRAVENOUS at 10:26

## 2017-12-21 RX ADMIN — CEFAZOLIN SODIUM 2000 MG: 1 SOLUTION INTRAVENOUS at 08:55

## 2017-12-21 RX ADMIN — FENTANYL CITRATE 75 MCG: 50 INJECTION INTRAMUSCULAR; INTRAVENOUS at 08:44

## 2017-12-21 RX ADMIN — MIDAZOLAM HYDROCHLORIDE 4 MG: 1 INJECTION, SOLUTION INTRAMUSCULAR; INTRAVENOUS at 08:44

## 2017-12-21 RX ADMIN — ONDANSETRON HYDROCHLORIDE 4 MG: 2 INJECTION, SOLUTION INTRAVENOUS at 08:55

## 2017-12-21 RX ADMIN — DEXAMETHASONE SODIUM PHOSPHATE 4 MG: 10 INJECTION INTRAMUSCULAR; INTRAVENOUS at 08:55

## 2017-12-21 RX ADMIN — FENTANYL CITRATE 25 MCG: 50 INJECTION INTRAMUSCULAR; INTRAVENOUS at 10:37

## 2017-12-21 RX ADMIN — PROPOFOL 200 MG: 10 INJECTION, EMULSION INTRAVENOUS at 08:55

## 2017-12-21 RX ADMIN — OXYCODONE HYDROCHLORIDE AND ACETAMINOPHEN 1 TABLET: 5; 325 TABLET ORAL at 11:33

## 2017-12-21 RX ADMIN — SODIUM CHLORIDE: 0.9 INJECTION, SOLUTION INTRAVENOUS at 09:20

## 2017-12-21 NOTE — DISCHARGE INSTRUCTIONS
POSTOPERATIVE INSTRUCTIONS following KNEE SURGERY    MEDICATIONS:  · Resume all home medications unless otherwise instructed by your surgeon  · Pain Medication:  Oxycodone 5 mg, 1-3 tablets every 3 hours as needed  · If you were given a regional anesthetic (nerve block), please begin taking the pain medication as soon as you get home, even if you have minimal or no pain  DO NOT WAIT FOR THE NERVE BLOCK TO WEAR OFF  · Possible side effects include nausea, constipation, and urinary retention  If you experience these side effects, please call our office for assistance  · Pain med refills are authorized only during office hours (8am-4pm, Mon-Fri)  · Anti-Inflammatory:  Naproxen 500 mg, 1 tablet every 12 hours for 4 weeks  · Take with food  Stop if you experience nausea, reflux, or stomach pain  · Nausea Medication:  Promethazine 12 5 mg, 1 tablet every 6 hours as needed  · Fill prescription ONLY if you expericnce severe nausea  · Blood Clot Prevention:  Not Necessary  · Pump your foot up and down 20 times per hour while you are less mobile  WOUND CARE:  · Keep the dressing clean and dry  Light drainage may occur the first 2 days postop  · You may remove the dressings and get the incision wet in the shower 72 hours after surgery  DO NOT remove steri-strips or sutures  DO NOT immerse the incision under water  Carefully pat the incision dry  If there is wound drainage, re-apply a fresh dry gauze dressing  · Please call our office ([phone]) if you experience either of the following:  · Sudden increase in swelling, redness, or warmth at the surgical site  · Excessive incisional drainage that persists beyond the 3rd day after surgery  · Oral temperature greater than 101 degrees, not relieved with Tylenol  · Shortness of breath, chest pain, nausea, or any other concerning symptoms    SWELLING CONTROL:  · Cold Therapy:   The cold therapy device may be used either continuously or only as needed, according to your preference  Do not let the pad directly touch your skin  Alternatively, apply ice (20 min on, 20 min off) as often as you feel is necessary  · Elevation:  Elevate the entire leg above heart level  Place pillows under your ankle to keep your knee straight  · Compression:  Apply ACE wraps or a thigh-length compression stocking as needed  RANGE OF MOTION:  · You are allowed FULL RANGE OF MOTION as tolerated  IMMOBILIZATION:  · None  You are allowed full range of motion as tolerated  ACTIVITY:   · BEAR FULL WEIGHT AS TOLERATED on the operative leg  Use crutches to assist only as needed  · Using Crutches on Stairs:  Going up, lead with your "good" (nonoperative) leg  Going down, lead with your "bad" (operative) leg  Use a hand rail when available  · Knee Extension:  Place a rolled towel or pillow under your ankle for 20-30 minutes 3-5 times per day  This will help to maintain full knee extension  · Quad Sets:  Sit or lie with your knee straight  Tighten your quadriceps (front thigh) muscle  Hold for 3 seconds, then relax  Repeat 20 times per hour while awake  PHYSICAL THERAPY:  · You will not need physical therapy  FOLLOW-UP APPOINTMENT:  · 4-5 days after surgery with:    [facility], [address] [facility]  [address] [place] [address], [address] [place] [address], [place], [facility]  [phone] ([facility])                                                                                                                                                                             [phone] (After-Hours)

## 2017-12-21 NOTE — ANESTHESIA POSTPROCEDURE EVALUATION
Post-Op Assessment Note      CV Status:  Stable    Mental Status:  Alert and awake    Hydration Status:  Euvolemic    PONV Controlled:  Controlled    Airway Patency:  Patent    Post Op Vitals Reviewed: Yes          Staff: Anesthesiologist           /73 (12/21/17 1030)    Temp      Pulse 64 (12/21/17 1030)   Resp 18 (12/21/17 1030)    SpO2 98 % (12/21/17 1030)

## 2017-12-21 NOTE — ANESTHESIA PROCEDURE NOTES
Peripheral Block    Patient location during procedure: holding area  Start time: 12/21/2017 8:44 AM  End time: 12/21/2017 8:49 AM  Reason for block: at surgeon's request and post-op pain management  Staffing  Anesthesiologist: Carla Colvin  Preanesthetic Checklist  Completed: patient identified, site marked, surgical consent, pre-op evaluation, timeout performed, IV checked, risks and benefits discussed and monitors and equipment checked  Peripheral Block  Patient position: supine  Prep: ChloraPrep  Patient monitoring: heart rate, continuous pulse ox and frequent blood pressure checks  Block type:  Intra-articular  Laterality: right  Injection technique: single-shot  Local infiltration: ropivacaine  Infiltration strength: 0 5 %  Dose: 30 mL  Needle  Needle type: short-bevel   Needle gauge: 18 gauge   Needle localization: anatomical landmarks  Assessment  Paresthesia pain: none  Heart rate change: no  Slow fractionated injection: yes  Post-procedure:  site cleaned

## 2017-12-21 NOTE — ANESTHESIA PREPROCEDURE EVALUATION
Review of Systems/Medical History  Patient summary reviewed  Chart reviewed  No history of anesthetic complications     Cardiovascular  Exercise tolerance: good,  Hyperlipidemia,    Pulmonary  Negative pulmonary ROS ,        GI/Hepatic  Negative GI/hepatic ROS               Endo/Other  Diabetes well controlled type 2 , Arthritis  Comment: obese   GYN       Hematology  Negative hematology ROS      Musculoskeletal  Negative musculoskeletal ROS Back pain , chronic back pain and lumbar pain, Sciatica (left),        Neurology  Negative neurology ROS      Psychology   Anxiety, Depression , being treated for depression,            Physical Exam    Airway    Mallampati score: III  TM Distance: >3 FB  Neck ROM: full     Dental   No notable dental hx     Cardiovascular  Rhythm: regular, Rate: normal, Cardiovascular exam normal    Pulmonary  Pulmonary exam normal Breath sounds clear to auscultation,     Other Findings        Anesthesia Plan  ASA Score- 2       Anesthesia Type- general with ASA Monitors  Additional Monitors:   Airway Plan: LMA  Induction- intravenous  Informed Consent- Anesthetic plan and risks discussed with patient

## 2017-12-21 NOTE — OP NOTE
OPERATIVE REPORT    PATIENT NAME: Kathleen Whiting   :  1965  MRN: [id number]  Pt Location: [facility] OR ROOM 01    SURGERY DATE: 2017    SURGEON(S) and ROLE:  Primary: Rafal Vazquez MD  Assisting: Odilia Evangelista PA-C    NOTE:  The presence of a physician assistant was necessary to help with patient positioning, surgical exposure, wound retraction, wound closure, and other key portions of the procedure  No qualified resident was available for this case  PREOPERATIVE DIAGNOSES:  Right Knee  Medial Meniscus Tear  Cruciate Cyst    POSTOPERATIVE DIAGNOSES:  Right Knee  Same as Preoperative Diagnosis    PROCEDURES:  Right Knee Arthroscopy with:  Partial Medial Meniscectomy  Decompression of Cruciate Cyst      ANESTHESIA TYPE:  General LMA and Intra-articular block    ANESTHESIA STAFF:   Anesthesiologist: Malissa Jules DO  CRNA: Erika Nixon CRNA    ESTIMATED BLOOD LOSS:  2 mL    TOURNIQUET TIME:  * No tourniquets in log *    PERIOPERATIVE ANTIBIOTICS:  cefazolin, 2 grams    IMPLANTS:  none    * No implants in log *    SPECIMENS:  * No specimens in log *    DRAINS:  None      OPERATIVE INDICATIONS:  The patient is a 45 y o  male with right knee pain due to cruciate cyst and medial meniscus tear  Surgical treatment was indicated due to persistent symptoms despite non-surgical treatment  After a thorough discussion of the potential risks, benefits, and alternative treatments, the patient agreed to proceed with surgery  The patient understands that the risks of surgery include, but are not limited to: infection, neurovascular injury, wound healing complications, venous thromboembolism, persistent pain, stiffness, instability, recurrence of symptoms, potential need for additional surgeries, and loss of limb or life  Oral and written consent for surgery was obtained from the patient preoperatively        EXAM UNDER ANESTHESIA:  Neutral alignment  ROM:  0-135 degrees  Ligaments stable to varus stress / valgus stress / Lachman / posterior drawer; negative pivot-shift  Patella tracking normal  without crepitus  PROCEDURE AND TECHNIQUE:  On the day of surgery, the patient was identified in the preoperative holding area  The operative site was marked by the surgeon  The patient was taken into the operating room  A time-out was conducted to confirm the patient's identity, the operative site, and the proposed procedure  The patient was anesthetized, and perioperative antibiotics were administered  The patient was positioned supine on the OR table  All bony prominences were padded  A tourniquet was not used  The operative site was prepped and draped using standard sterile technique  An anterolateral portal was established, and the arthroscope was inserted into the knee joint  An anteromedial portal was established under direct visualization, and diagnostic arthroscopy was performed  The joint demonstrated moderate synovitis which was debrided with a motorized shaver  In the patellofemoral compartment, the trochlea demonstrated pristine articular cartilage  The patella demonstrated pristine articular cartilage  The patella tracking was normal        In the medial compartment, the medial femoral condyle demonstrated pristine articular cartilage  The medial tibial plateau demonstrated pristine articular cartilage  The medial meniscus had a complex tear involving the posterior horn  The torn portion of the meniscus was removed and debrided to a stable base with a basket and motorized shaver  80% of the meniscus width remained intact  In the lateral compartment, the lateral femoral condyle demonstrated pristine articular cartilage  The lateral tibial plateau demonstrated pristine articular cartilage  The lateral meniscus was intact       In the intercondylar notch, the PCL was intact  The ACL was intact  The arthroscope was passed under the PCL into the posteromedial compartment  Accessory posteromedial and posterolateral portals were made localizing with a spinal needle  The entire posterior recess was debrided of cystic material, taking care not to violate the posterior capsule or cause injury to the posterior neurovascular structures  At the conclusion of the procedure, the instruments were withdrawn  The portals and incisions were closed with absorbable sutures and steri-strips  A sterile dressing was applied  The surgical drapes were removed  A soft bandage was applied to the operative knee  The patient was awakened from anesthesia and transported to the PACU in stable condition        COMPLICATIONS:  None    PATIENT DISPOSITION:  PACU       SIGNATURE:  Mindy Bennett MD  DATE:  December 21, 2017  TIME:  11:10 AM

## 2017-12-29 ENCOUNTER — ALLSCRIPTS OFFICE VISIT (OUTPATIENT)
Dept: OTHER | Facility: OTHER | Age: 52
End: 2017-12-29

## 2017-12-29 DIAGNOSIS — S83.242D OTHER TEAR OF MEDIAL MENISCUS, CURRENT INJURY, LEFT KNEE, SUBSEQUENT ENCOUNTER: ICD-10-CM

## 2018-01-10 NOTE — PROGRESS NOTES
Preliminary Nursing Report                Patient Information    Initial Encounter Entry Date:   10/25/2017 2:35 PM EST (Automated Transmission Automated Transmission)       Last Modified:   {MarkH  ModifiedOn}              Legal Name: Sandra Acevedo        Social Security Number:        YOB: 1965        Age (years): 45        Gender: M        Body Mass Index (BMI): 31 kg/m2        Height: 66 in  Weight: 191 lbs (87 kgs)           Address:   [address] [place] [address]              Phone: [phone]   (consent to leave messages)        Email:        Ethnicity: Decline to State        Sabianism:        Marital Status:        Preferred Language: English        Race: Other Race                    Patient Insurance Information        Primary Insurance Information Carrier Name: {Primary  CarrierName}           Carrier Address:   {Primary  CarrierAddress}              Carrier Phone: {Primary  CarrierPhone}          Group Number: {Primary  GroupNumber}          Policy Number: {Primary  PolicyNumber}          Insured Name: {Primary  InsuredName}          Insured : {Primary  InsuredDOB}          Relationship to Insured: {Primary  RelationshiptoInsured}           Secondary Insurance Information Carrier Name: {Secondary  CarrierName}           Carrier Address:   {Secondary  CarrierAddress}              Carrier Phone: {Secondary  CarrierPhone}          Group Number: {Secondary  GroupNumber}          Policy Number: {Secondary  PolicyNumber}          Insured Name: {Secondary  InsuredName}          Insured : {Secondary  InsuredDOB}          Relationship to Insured: {Secondary  RelationshiptoInsured}                       Health Profile   Booking #:   Masoud Bergman #: [id number]               DOS: 2018    Surgery : INCISE FINGER TENDON SHEATH    Add'l Procedures/Notes:     Surgery Risk: Minor          Precautions     Controlled diabetes mellitus       Severe sleep apnea Allergies    No Known Drug Allergies             Medications    Allegra CAPS       Atorvastatin Calcium 20 MG Oral Tablet       Cyclobenzaprine HCl - 5 MG Oral Tablet       Fluticasone Propionate 50 MCG/ACT Nasal Suspension       Meloxicam 7 5 MG Oral Tablet       MetFORMIN HCl - 500 MG Oral Tablet       Naproxen 500 MG Oral Tablet               Conditions    Aftercare following surgery       Controlled diabetes mellitus       Effusion of left knee       Effusion of left prepatellar bursa       Encounter for immunization       Encounter for screening colonoscopy       Flu vaccine need       Hamstring strain, right, initial encounter       Hyperlipidemia       Knee mass, right       Localized primary osteoarthritis of lower leg, left       Low back pain       Lumbar radiculopathy       Mouth lesion       Need for Tdap vaccination       OA (osteoarthritis) of finger       Obesity       Pain in left knee       Painful knee       Poison ivy dermatitis       Posterior knee pain, right       Seasonal allergies       Severe sleep apnea       Snoring       Tear of lateral meniscus of knee, left, initial encounter       Tear of medial meniscus of left knee, subsequent encounter       Trigger finger, unspecified finger, unspecified laterality       Trigger index finger of left hand       Trigger index finger of right hand               Family History    None             Surgical History    None             Social History    Denies Drug use       Denies Sexual history       Full-time employment       Clinical Comments: [employer];               Never a smoker       Non-smoker       Social alcohol use                               Patient Instructions       Medical Procedure Risk  NPO Instructions   The day before surgery it is recommended to have a light dinner at your usual time and you are allowed a light snack early in the evening   Do not eat anything heavy or eat a big meal after 7pm  Do not eat or drink anything after midnight prior to your surgery  If you are supposed to take any of your medications, do so with a sip of water  Failure to follow these instructions can lead to an increased risk of lung complications and may result in a delay or cancellation of your procedure  If you have any questions, contact your institution for further instructions  No candy, no gum, no mints, no chewing tobacco          Naproxen 500 MG Oral Tablet, Meloxicam 7 5 MG Oral Tablet  Medication Instruction (NSAID - Pain Medication) 61  Please stop ibuprofen, naproxen and other non-steroidal anti-inflammatory drugs (NSAIDS) for 24 hrs before surgery  Atorvastatin Calcium 20 MG Oral Tablet  Medication Instruction (Cholesterol Medication) 81, 82  Please continue to take this medication on your normal schedule  If this is an oral medication and you take in the morning, you may do so with a sip of water  Fluticasone Propionate 50 MCG/ACT Nasal Suspension  Medication Instruction (Steroids) 83, 84, 102  Please continue your steroid medications up to and including the day of surgery (with a sip of water, if oral medication)  Severe sleep apnea  Sleep Apnea   Please notify surgeon and anesthesiologist if you have sleep apnea, severe snoring, or daytime somnolence  For those sleep apnea patients with continuous positive airway pressure (CPAP or BiPAP) machines, please bring your machine to the hospital on the day of surgery  Testing Considerations       ? Blood Glucose on Day of Surgery t  Please check the blood sugar on the morning of surgery  Triggered by: Controlled diabetes mellitus               Consultations       No recommendations for this classification  Miscellaneous Questions         Question: Are you able to walk up a flight of stairs, walk up a hill or do heavy housework WITHOUT having chest pain or shortness of breath?        Answer: Hali Randle Allergies/Conditions/Medications Not Found        The following were not recognized by our system when generating the recommendations  Please consider if this would impact any preoperative protocols  ? Aftercare following surgery       ? Denies Sexual history       ? Encounter for immunization       ? Full-time employment       ? Hamstring strain, right, initial encounter       ?        ? Never a smoker       ? Non-smoker       ? Social alcohol use       ? Tear of lateral meniscus of knee, left, initial encounter       ? Tear of medial meniscus of left knee, subsequent encounter       ? Trigger finger, unspecified finger, unspecified laterality       ? Trigger index finger of left hand       ? Trigger index finger of right hand       ? Allegra CAPS       ? Cyclobenzaprine HCl - 5 MG Oral Tablet       ? MetFORMIN HCl - 500 MG Oral Tablet                  Appointment Information         Date:    02/06/2018        Location:    [place]        Address:           Directions:                      Footnotes revision 14      ?? Denotes a free-text entry  Legal Disclaimer: Any and all recommendations and services provided herein are designed to assist in the preoperative care of the patient  Nothing contained herein is designed to replace, eliminate or alleviate the responsibility of the attending physician to supervise and determine the patient?s preoperative care and course of treatment  Failure to provide complete, accurate information may negatively impact the system?s ability to recommend the proper preoperative protocol  THE ATTENDING PHYSICIAN IS RESPONSIBLE TO REVIEW THE SUGGESTED PREOPERATIVE PROTOCOLS/COURSE OF TREATMENT AND PRESCRIBE THE FINAL COURSE OF PREOPERATIVE TREATMENT IN CONSULTATION WITH THE PATIENT  THE [employer] SYSTEM AND ITS MATERIALS ARE PROVIDED ? AS IS? WITHOUT WARRANTY OF ANY KIND, EXPRESS OR IMPLIED, INCLUDING, BUT NOT LIMITED TO, WARRANTIES OF PERFORMANCE OR MERCHANTABILITY OR FITNESS FOR A PARTICULAR PURPOSE  PATIENT AND PHYSICIANS HEREBY AGREE THAT THEIR USE OF THE MATERIALS AND RESOURCES ACT AS A CONSENT TO RELEASE AND WAIVE ePREOP FROM ANY AND ALL CLAIMS OF WARRANTY, TORT OR CONTRACT LAW OF ANY KIND  Electronically signed Mino DE LA CRUZ    Oct 25 2017  7:20PM EST

## 2018-01-10 NOTE — CONSULTS
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  He admits to history of trigger digits status post cortisone injection about 5 years ago  He did get significant relief however over the last year his symptoms have returned  He does awaken in the morning with his fingers locked  Review of Systems  Focused-Male Orthopedics:   Constitutional: No fever or chills, feels well, no tiredness, no recent weight loss or weight gain  Eyes: No complaints of red eyes, no eyesight problems  ENT: no complaints of loss of hearing, no nosebleeds, no sore throat  Cardiovascular: No complaints of chest pain, no palpitations, no leg claudication or lower extremity edema  Respiratory: No complaints of shortness of breath, no wheezing, no cough  Gastrointestinal: No complaints of abdominal pain, no constipation, no nausea or vomiting, no diarrhea or bloody stools  Genitourinary: No complaints of dysuria or incontinence, no hesitancy, no nocturia  Musculoskeletal: as noted in HPI  Integumentary: No complaints of skin rash or lesion, no itching or dry skin, no skin wounds  Neurological: No complaints of headache, no confusion, no numbness or tingling, no dizziness  Psychiatric: No suicidal thoughts, no anxiety, no depression  Endocrine: No muscle weakness, no frequent urination, no excessive thirst, no feelings of weakness  Active Problems    1  Aftercare following surgery (V58 89) (Z48 89)   2  Diabetes type 2, uncontrolled (250 02) (E11 65)   3  Effusion of left knee (719 06) (M25 462)   4  Effusion of left prepatellar bursa (719 06) (M25 462)   5   Encounter for immunization (V03 89) (Z23) 6  Encounter for screening colonoscopy (V76 51) (Z12 11)   7  Flu vaccine need (V04 81) (Z23)   8  Hamstring strain, right, initial encounter (843 8) (S76 311A)   9  Hyperlipidemia (272 4) (E78 5)   10  Localized primary osteoarthritis of lower leg, left (715 16) (M17 12)   11  Low back pain (724 2) (M54 5)   12  Lumbar radiculopathy (724 4) (M54 16)   13  Mouth lesion (528 9) (K13 70)   14  Need for Tdap vaccination (V06 1) (Z23)   15  Obesity (278 00) (E66 9)   16  Pain in left knee (719 46) (M25 562)   17  Painful knee (719 46) (M25 569)   18  Seasonal allergies (477 9) (J30 2)   19  Snoring (786 09) (R06 83)   20  Tear of lateral meniscus of knee, left, initial encounter   21  Tear of medial meniscus of left knee, subsequent encounter   22  Trigger finger, unspecified finger, unspecified laterality (727 03) (M65 30)    Surgical History    1  History of Femur Repair   2  History of Hernia Repair   3  History of Knee Arthroscopy With Medial Meniscus Repair    Family History    1  Family history of diabetes mellitus (V18 0) (Z83 3)   2  Family history of kidney disease (V18 69) (Z84 1)    3  Family history of diabetes mellitus (V18 0) (Z83 3)    Social History    · Denied: History of Drug use   · Full-time employment   ·    · Never a smoker   · Non-smoker (V49 89) (Z78 9)   · Denied: History of Sexual history   · Social alcohol use (Z78 9)    Current Meds   1  Allegra CAPS; Therapy: (Recorded:31Ejo0182) to Recorded   2  Atorvastatin Calcium 20 MG Oral Tablet; TAKE one TABLET At Bedtime; Therapy: 95XBA6623 to (Last Sharmon Cake)  Requested for: 26Oct2016 Ordered   3  Cyclobenzaprine HCl - 5 MG Oral Tablet; Take one tablet three times daily as needed for   pain; Therapy: 47KJO3542 to (Last Rx:37Etd1310)  Requested for: 94LEL9680 Ordered   4   MetFORMIN HCl - 500 MG Oral Tablet; take 1 tablet once daily with a meal;   Therapy: 05Pxd2954 to (Evaluate:80Jai2263)  Requested for: 92LRG3415; Last   Rx:26Oct2016 Ordered   5  MethylPREDNISolone Acetate 40 MG/ML Injection Suspension; USE AS DIRECTED; To   Be Done: 07FWM6247; Status: HOLD FOR - Administration Ordered   6  Naproxen 500 MG Oral Tablet; TAKE 1 TABLET EVERY 12 HOURS WITH FOOD; Therapy: 90KID1201 to (Evaluate:71Kzz4163)  Requested for: 90MHY9047; Last   Rx:58Hga1454 Ordered    Allergies    1  No Known Drug Allergies    Vitals  Signs   Recorded: 29RGT4909 01:43PM   Heart Rate: 97  Systolic: 012  Diastolic: 77  Height: 5 ft 7 in  Weight: 192 lb   BMI Calculated: 30 07  BSA Calculated: 1 99    Physical Exam      General: No acute distress, age-appropriate  Neck: Supple, trachea midline  HEENT: Normocephalic atraumatic, mucous membranes are moist, sclera are nonicteric  Cardiovascular: No discernable arrhythmia  Respiratory: Breathing is even and unlabored, no stridor or audible wheezing  Psychiatric: Awake alert and oriented x3, normal mood and affect  Right hand: There is no swelling or erythema  There is a large palpable nodule the base of the ring index finger at the A1 pulley  There is mild tenderness palpation over this area  There is palpable and visible locking with digital flexion  He otherwise has full range of motion  Sensation is intact distally  Left hand: No swelling or erythema  Palpable nodule at the A1 pulley  Minimal tenderness  Full digital range of motion  Locking with full digital flexion  Sensation is intact distally  Brisk cap refill distally  Assessment    1  Trigger index finger of left hand (727 03) (M65 322)   2  Trigger index finger of right hand (727 03) (M65 321)    Plan  Trigger index finger of right hand    1  Follow Up After Surgery Evaluation and Treatment  Follow-up  Status: Hold For -   Scheduling  Requested for: 61WCL1308    Discussion/Summary  Discussion Summary:   I reviewed the diagnosis of trigger digits with the patient  He would like to have these treated surgically    The surgery as well as the recovery  He does work in heavy construction which will be restricted immediately after surgery for several weeks  We'll schedule him for RIGHT INDEX finger trigger release under STRAIGHT local anesthesia  We will schedule the LEFT INDEX finger trigger release one to 2 weeks after the right  I will see him postoperatively        Future Appointments    Signatures   Electronically signed by : ARMOND Burks ; Mar  9 2017  4:35PM EST                       (Author)

## 2018-01-10 NOTE — CONSULTS
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  He admits to history of trigger digits status post cortisone injection about 5 years ago  He did get significant relief however over the last year his symptoms have returned  He does awaken in the morning with his fingers locked  Review of Systems    Constitutional: No fever or chills, feels well, no tiredness, no recent weight loss or weight gain  Eyes: No complaints of red eyes, no eyesight problems  ENT: no complaints of loss of hearing, no nosebleeds, no sore throat  Cardiovascular: No complaints of chest pain, no palpitations, no leg claudication or lower extremity edema  Respiratory: No complaints of shortness of breath, no wheezing, no cough  Gastrointestinal: No complaints of abdominal pain, no constipation, no nausea or vomiting, no diarrhea or bloody stools  Genitourinary: No complaints of dysuria or incontinence, no hesitancy, no nocturia  Musculoskeletal: as noted in HPI  Integumentary: No complaints of skin rash or lesion, no itching or dry skin, no skin wounds  Neurological: No complaints of headache, no confusion, no numbness or tingling, no dizziness  Psychiatric: No suicidal thoughts, no anxiety, no depression  Endocrine: No muscle weakness, no frequent urination, no excessive thirst, no feelings of weakness  Active Problems    1  Aftercare following surgery (V58 89) (Z48 89)   2  Diabetes type 2, uncontrolled (250 02) (E11 65)   3  Effusion of left knee (719 06) (M25 462)   4  Effusion of left prepatellar bursa (719 06) (M25 462)   5  Encounter for immunization (V03 89) (Z23)   6  Encounter for screening colonoscopy (V76 51) (Z12 11)   7  Flu vaccine need (V04 81) (Z23)   8  Hamstring strain, right, initial encounter (843 8) (S76 311A)   9  Hyperlipidemia (272 4) (E78 5)   10  Localized primary osteoarthritis of lower leg, left (715 16) (M17 12)   11  Low back pain (724 2) (M54 5)   12  Lumbar radiculopathy (724 4) (M54 16)   13  Mouth lesion (528 9) (K13 70)   14  Need for Tdap vaccination (V06 1) (Z23)   15  Obesity (278 00) (E66 9)   16  Pain in left knee (719 46) (M25 562)   17  Painful knee (719 46) (M25 569)   18  Seasonal allergies (477 9) (J30 2)   19  Snoring (786 09) (R06 83)   20  Tear of lateral meniscus of knee, left, initial encounter   21  Tear of medial meniscus of left knee, subsequent encounter   22  Trigger finger, unspecified finger, unspecified laterality (727 03) (M65 30)    Surgical History    · History of Femur Repair   · History of Hernia Repair   · History of Knee Arthroscopy With Medial Meniscus Repair    Family History    · Family history of diabetes mellitus (V18 0) (Z83 3)   · Family history of kidney disease (V18 69) (Z84 1)    · Family history of diabetes mellitus (V18 0) (Z83 3)    Social History    · Denied: History of Drug use   · Full-time employment   · [employer]   ·    · Never a smoker   · Non-smoker (V49 89) (Z78 9)   · Denied: History of Sexual history   · Social alcohol use (Z78 9)    Current Meds   1  Allegra CAPS; Therapy: (Recorded:89Nra6281) to Recorded   2  Atorvastatin Calcium 20 MG Oral Tablet; TAKE one TABLET At Bedtime; Therapy: 33DWM6209 to (Last Michelene Bound)  Requested for: 26Oct2016 Ordered   3  Cyclobenzaprine HCl - 5 MG Oral Tablet; Take one tablet three times daily as needed for   pain; Therapy: 03EZI8663 to (Last Rx:73Wlc5086)  Requested for: 54TBM6465 Ordered   4  MetFORMIN HCl - 500 MG Oral Tablet; take 1 tablet once daily with a meal;   Therapy: 40Tkq6634 to (Evaluate:42Ahd7264)  Requested for: 26Oct2016; Last   Rx:26Oct2016 Ordered   5   MethylPREDNISolone Acetate 40 MG/ML Injection Suspension; USE AS DIRECTED; To   Be Done: 18ZFZ6772; Status: HOLD FOR - Administration Ordered   6  Naproxen 500 MG Oral Tablet; TAKE 1 TABLET EVERY 12 HOURS WITH FOOD; Therapy: 40EDG9814 to (Evaluate:05Jan2017)  Requested for: 05TLK3637; Last   Rx:74Ibj8971 Ordered    Allergies    1  No Known Drug Allergies    Vitals  Signs    Heart Rate: 41DVUHECGW: 607ANIOOOKDY: 39XHQRNI: 5 ft 7 inWeight: 192 lb BMI Calculated: 30 07BSA Calculated: 1 99    Physical Exam      General: No acute distress, age-appropriate  Neck: Supple, trachea midline  HEENT: Normocephalic atraumatic, mucous membranes are moist, sclera are nonicteric  Cardiovascular: No discernable arrhythmia  Respiratory: Breathing is even and unlabored, no stridor or audible wheezing  Psychiatric: Awake alert and oriented x3, normal mood and affect  Right hand: There is no swelling or erythema  There is a large palpable nodule the base of the ring index finger at the A1 pulley  There is mild tenderness palpation over this area  There is palpable and visible locking with digital flexion  He otherwise has full range of motion  Sensation is intact distally  Left hand: No swelling or erythema  Palpable nodule at the A1 pulley  Minimal tenderness  Full digital range of motion  Locking with full digital flexion  Sensation is intact distally  Brisk cap refill distally  Assessment    1  Trigger index finger of left hand (727 03) (M65 322)   2  Trigger index finger of right hand (727 03) (M65 321)    Plan     Trigger index finger of right hand    · Follow Up After Surgery Evaluation and Treatment  Follow-up  Status: Hold For -  Scheduling  Requested for: 87EMB2102    Discussion/Summary    I reviewed the diagnosis of trigger digits with the patient  He would like to have these treated surgically  The surgery as well as the recovery   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

## 2018-01-11 ENCOUNTER — APPOINTMENT (OUTPATIENT)
Dept: PHYSICAL THERAPY | Facility: REHABILITATION | Age: 53
End: 2018-01-11
Payer: COMMERCIAL

## 2018-01-11 DIAGNOSIS — S83.242D OTHER TEAR OF MEDIAL MENISCUS, CURRENT INJURY, LEFT KNEE, SUBSEQUENT ENCOUNTER: ICD-10-CM

## 2018-01-11 PROCEDURE — G8978 MOBILITY CURRENT STATUS: HCPCS

## 2018-01-11 PROCEDURE — 97162 PT EVAL MOD COMPLEX 30 MIN: CPT

## 2018-01-11 PROCEDURE — G8979 MOBILITY GOAL STATUS: HCPCS

## 2018-01-11 PROCEDURE — 97110 THERAPEUTIC EXERCISES: CPT

## 2018-01-11 NOTE — CONSULTS
Chief Complaint    1  Finger Problem    Active Problems    1  Aftercare following surgery (V58 89) (Z48 89)   2  Controlled diabetes mellitus (250 00) (E11 9)   3  Effusion of left knee (719 06) (M25 462)   4  Effusion of left prepatellar bursa (719 06) (M25 462)   5  Encounter for immunization (V03 89) (Z23)   6  Encounter for screening colonoscopy (V76 51) (Z12 11)   7  Flu vaccine need (V04 81) (Z23)   8  Hamstring strain, right, initial encounter   9  Hyperlipidemia (272 4) (E78 5)   10  Knee mass, right (719 66) (M25 861)   11  Localized primary osteoarthritis of lower leg, left (715 16) (M17 12)   12  Low back pain (724 2) (M54 5)   13  Lumbar radiculopathy (724 4) (M54 16)   14  Mouth lesion (528 9) (K13 70)   15  Need for Tdap vaccination (V06 1) (Z23)   16  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) (M19 049)   17  Obesity (278 00) (E66 9)   18  Pain in left knee (719 46) (M25 562)   19  Painful knee (719 46) (M25 569)   20  Poison ivy dermatitis (692 6) (L23 7)   21  Posterior knee pain, right (719 46) (M25 561)   22  Seasonal allergies (477 9) (J30 2)   23  Severe sleep apnea (780 57) (G47 30)   24  Snoring (786 09) (R06 83)   25  Tear of lateral meniscus of knee, left, initial encounter   26  Tear of medial meniscus of left knee, subsequent encounter (V58 89,836 0) (S83 242D)   27  Trigger finger, unspecified finger, unspecified laterality (727 03) (M65 30)   28  Trigger index finger of left hand (727 03) (M65 322)   29  Trigger index finger of right hand (727 03) (M65 321)    Past Medical History    1  History of influenza vaccination (V49 89) (Z92 29)    Surgical History    1  History of Femur Repair   2  History of Hernia Repair   3  History of Knee Arthroscopy With Medial Meniscus Repair    Family History    1  Family history of diabetes mellitus (V18 0) (Z83 3)   2  Family history of kidney disease (V18 69) (Z84 1)    3   Family history of diabetes mellitus (V18 0) (Z83 3)    Social History    · Denied: History of Drug use   · Full-time employment   ·    · Never a smoker   · Non-smoker (V49 89) (Z78 9)   · Denied: History of Sexual history   · Social alcohol use (Z78 9)    Current Meds   1  Allegra CAPS; Therapy: (Recorded:53Ubh2835) to Recorded   2  Atorvastatin Calcium 20 MG Oral Tablet; take one tablet at bedtime; Therapy: 34HMR8571 to (Evaluate:14Oct2017)  Requested for: 38Phz2366; Last   Rx:18Ckw2171 Ordered   3  Cyclobenzaprine HCl - 5 MG Oral Tablet; Take one tablet three times daily as needed for   pain; Therapy: 77IAI9827 to (Last Rx:17Aug2017)  Requested for: 45Zzp0826 Ordered   4  Fluticasone Propionate 50 MCG/ACT Nasal Suspension; USE 2 SPRAYS IN EACH   NOSTRIL ONCE DAILY; Therapy: 92ENH5994 to Recorded   5  Meloxicam 7 5 MG Oral Tablet; TAKE 1 TABLET TWICE DAILY WITH FOOD; Therapy: 92DGD8138 to (Britney Pean)  Requested for: 58QVB9697; Last   Rx:05Oct2017 Ordered   6  MetFORMIN HCl - 500 MG Oral Tablet; take 1 tablet once daily with a meal;   Therapy: 41Qfm1247 to (Evaluate:12Jan2018)  Requested for: 97Tie6697; Last   Rx:61Gdk3001 Ordered   7  Naproxen 500 MG Oral Tablet; TAKE 1 TABLET EVERY 12 HOURS WITH FOOD; Therapy: 46IMD2816 to (0431 35 06 90)  Requested for: 81MOP7480; Last   Rx:23Fby5888 Ordered    Allergies    1  No Known Drug Allergies    Vitals  Signs   Recorded: 96MOL2809 01:51PM   Heart Rate: 82  Systolic: 732  Diastolic: 90  Height: 5 ft 6 in  Weight: 191 lb 2 oz  BMI Calculated: 30 85  BSA Calculated: 1 96    Assessment    1  Trigger finger, unspecified finger, unspecified laterality (727 03) (M65 30)    Plan  Trigger finger, unspecified finger, unspecified laterality    1  Follow Up After Surgery Evaluation and Treatment  Follow-up  Status: Hold For -   Scheduling  Requested for: 60VIA7273   2   Please refer to the patient information sheet that was provided at your office visit today for   information on  your current condition ; Status:Complete; Done: 86IBM3477 02:26PM    Discussion/Summary  Discussion Summary:   Assessment: Bilateral trigger fingers index, long, ring, and small fingers  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    Plan: At this point, patient wishes surgical release of the left hand index and small finger  Patient would like cortisone injections to the long and ring  We will observe the right hand  The anatomy and physiology of trigger finger was discussed with the patient today in the office  Edema and increased contact pressure within the flexor tendons at the A1 pulley can cause pain, crepitation, and limitation of function  Treatment options include resting MP blocking splints to decrease edema, oral anti-inflammatory medications, home or formal therapy exercises, up to 2 steroid injections or surgical release  While majority of patients do respond to conservative treatment, up to 20% may require surgical release  The patient has elected release of the left hand index and small finger trigger finger with injection into the left hand long and ring finger trigger finger  The patient has elected to undergo a release of the A1 pulley (trigger finger)  A small incision will be made over the palmar aspect of the hand, the tendon sheath holding the flexor tendons will be released  In the postoperative period, light activities are allowed immediately, driving is allowed when narcotic medication has stopped, and the incision may get wet after 2 days  Heavy activities (lifting more than approximately 10 pounds) will be allowed after the follow up appointment in 1-2 weeks  While the pain and discomfort within the wrist typically improves rapidly, some residual discomfort may be present for up to 6 weeks  The nodule that is typically palpable in the palmar aspect of the hand will not be removed, as this would necessitate removal of a portion of the flexor tendon, however the catching, clicking, and locking should resolve   Approximate success rate is 98%  The risks and benefits of the procedure were explained to the patient, which include, but are not limited to: Bleeding, infection, recurrence, pain, scar, damage to tendons, damage to nerves, and damage to blood vessels, need for future surgery and complications related to anesthesia  If bony work is done, risks also include malunion and nonunion  These risks, along with alternative conservative treatment options, and postoperative protocols were voiced back and understood by the patient  All questions were answered to the patient's satisfaction  The patient agrees to comply with a standard postoperative protocol, and is willing to proceed  Education was provided via written and auditory forms  There were no barriers to learning  Written handouts regarding wound care, incision and scar care, and general preoperative information, as well as risks and benefits were provided to the patient  This note was dictated with dictation software  As such, and may contain typographical errors, improperly dictated words, background noise, or other errors  HPI:  The patient presents evaluation bilateral hand trigger fingers index, long, ring, and small fingers well as PIP joint arthritis  Mild to moderate pain with catching popping locking aching in nature without numbness tingling fevers chills been ongoing for months  The past medical history, medications, allergies, and review of systems have been read and reviewed on the chart and have been updated  Review of Systems:  Constitutional: Negative  Skin: Negative except as above  Musculoskeletal: As above  Neurologic: Negative except as above  Psychiatric: Negative    Examination:    General / Psych: Awake and Oriented, No acute distress, age appropriate  HEENT: Normocephalic, atraumatic, mucous membranes moist, neck supple, trachea midline    Abdomen: No rebound or signs of guarding  Cardio: No discernible arrhythmia, 2+ pulses with good capillary refill  Pulmonary: No audible wheezing or audible stridor  Neurologic: [The patient is neurovascularly intact in the median, ulnar, and radial nerve distribution  There is normal sensation and good capillary refill within the digits  2+ pulses ]  Skin: [No lesions, rashes, streaking, purulence, abscesses, lymphangitis]  Musculoskeletal: Tenderness to palpation PIP joints index through small bilaterally, catching popping clicking locking bilateral hand index through small finger   No instability, full range of motion    Diagnostic Studies: [none]     Future Appointments    Signatures   Electronically signed by : ARMOND Clark ; Oct 25 2017  2:27PM EST                       (Author)

## 2018-01-11 NOTE — RESULT NOTES
Verified Results  (1) COMPREHENSIVE METABOLIC PANEL 28QLK3387 20:19SH Mariam Samuels     Test Name Result Flag Reference   GLUCOSE 96 mg/dL  65-99   Fasting reference interval   UREA NITROGEN (BUN) 13 mg/dL  7-25   CREATININE 0 82 mg/dL  0 70-1 33   For patients >52years of age, the reference limit  for Creatinine is approximately 13% higher for people  identified as -American  eGFR NON-AFR  AMERICAN 101 mL/min/1 73m2  > OR = 60   eGFR AFRICAN AMERICAN 117 mL/min/1 73m2  > OR = 60   BUN/CREATININE RATIO   5-28   NOT APPLICABLE (calc)   SODIUM 140 mmol/L  135-146   POTASSIUM 4 1 mmol/L  3 5-5 3   CHLORIDE 104 mmol/L     CARBON DIOXIDE 28 mmol/L  20-31   CALCIUM 9 2 mg/dL  8 6-10 3   PROTEIN, TOTAL 6 6 g/dL  6 1-8 1   ALBUMIN 4 1 g/dL  3 6-5 1   GLOBULIN 2 5 g/dL (calc)  1 9-3 7   ALBUMIN/GLOBULIN RATIO 1 6 (calc)  1 0-2 5   BILIRUBIN, TOTAL 0 5 mg/dL  0 2-1 2   ALKALINE PHOSPHATASE 60 U/L     AST 25 U/L  10-35   ALT 25 U/L  9-46     (Q) MICROALBUMIN, RANDOM URINE (W/CREATININE) 91NCT2892 06:57AM Santana Chan     Test Name Result Flag Reference   CREATININE, RANDOM URINE 215 mg/dL     MICROALBUMIN 1 4 mg/dL     Reference Range  Not established   MICROALBUMIN/CREATININE$RATIO, RANDOM URINE 7 mcg/mg creat  <30   The ADA defines abnormalities in albumin  excretion as follows:     Category         Result (mcg/mg creatinine)     Normal                    <30  Microalbuminuria            Clinical albuminuria   > OR = 300     The ADA recommends that at least two of three  specimens collected within a 3-6 month period be  abnormal before considering a patient to be  within a diagnostic category       (Q) HEMOGLOBIN A1c 74LJW6527 06:57AM Santana Chan   REPORT COMMENT:  FASTING:YES     Test Name Result Flag Reference   HEMOGLOBIN A1c 5 8 % of total Hgb H <5 7   For someone without known diabetes, a hemoglobin   A1c value between 5 7% and 6 4% is consistent with  prediabetes and should be confirmed with a   follow-up test      For someone with known diabetes, a value <7%  indicates that their diabetes is well controlled  A1c  targets should be individualized based on duration of  diabetes, age, comorbid conditions, and other  considerations  This assay result is consistent with an increased risk  of diabetes  Currently, no consensus exists regarding use of  hemoglobin A1c for diagnosis of diabetes for children         Signatures   Electronically signed by : Ignacio Nissen, M D ; May 11 2017  5:22PM EST                       (Author)

## 2018-01-11 NOTE — RESULT NOTES
Message   Please change interval to 10 years  Verified Results  (1) TISSUE EXAM [id number] 09:58AM Reyna Leaver     Test Name Result Flag Reference   LAB AP CASE REPORT (Report)     Surgical Pathology Report             Case: [id number]                   Authorizing Provider: Piero Lozano MD       Collected:      08/18/2016 0958        Ordering Location:   [address] [place] [address]   Received:      08/18/2016 [place] [address] [facility]                               Pathologist:      Lila Jeter MD                             Specimens:  A) - Polyp, Colorectal, Transverse Colon Polyp                             B) - Polyp, Colorectal, Descending Colon Polyp   LAB AP FINAL DIAGNOSIS (Report)     A  Transverse colon polyp:    - Colonic mucosa with focal minimal surface hyperplastic changes  - Negative for dysplasia and malignancy  B  Descending colon polyp:    - Colonic mucosa with focal minimal surface hyperplastic changes  - Negative for dysplasia and malignancy  Electronically signed by Lila Jeter MD on 8/19/2016 at 4:49 PM   LAB AP NOTE      Interpretation performed at [facility], [place]   LAB AP SURGICAL ADDITIONAL INFORMATION (Report)     These tests were developed and their performance characteristics   determined by [facility] or [email or web address]  They may not be cleared or approved by the [place]  Food and   Drug Administration  The FDA has determined that such clearance or   approval is not necessary  These tests are used for clinical purposes  They should not be regarded as investigational or for research  This   laboratory has been approved by CLIA 88, designated as a high-complexity   laboratory and is qualified to perform these tests  LAB AP GROSS DESCRIPTION (Report)     A   The specimen is received in formalin, labeled with the patient's name   and hospital number, and is designated transverse colon polyp, is a   single irregularly shaped fragment of tan soft tissue measuring 0 5 cm in   greatest dimension  Entirely submitted  One cassette  B  The specimen is received in formalin, labeled with the patient's name   and hospital number, and is designated descending colon polyp, is a   single irregularly shaped fragment of tan-brown soft tissue measuring 0 4   cm in greatest dimension  Entirely submitted  One cassette  Note: The estimated total formalin fixation time based upon information   provided by the submitting clinician and the standard processing schedule   is 18 5 hours  RLR   LAB AP CLINICAL INFORMATION Cold biopsy     Cold biopsy       Discussion/Summary   completely bening polyps seen in the colon  He will need a repeat colnoscopy in 10 years unless there is family history of polyps or colon cancer in the family in which case we will do it in 5 years  Thanks

## 2018-01-12 NOTE — PROGRESS NOTES
Chief Complaint  pt here for flu vaccine  pt tolerated well  please refer to flowsheet      Active Problems    1  Aftercare following surgery (V58 89) (Z48 89)   2  Diabetes type 2, uncontrolled (250 02) (E11 65)   3  Effusion of left knee (719 06) (M25 462)   4  Effusion of left prepatellar bursa (719 06) (M25 462)   5  Encounter for immunization (V03 89) (Z23)   6  Encounter for screening colonoscopy (V76 51) (Z12 11)   7  Localized primary osteoarthritis of lower leg, left (715 16) (M17 12)   8  Mouth lesion (528 9) (K13 70)   9  Need for Tdap vaccination (V06 1) (Z23)   10  Obesity (278 00) (E66 9)   11  Pain in left knee (719 46) (M25 562)   12  Seasonal allergies (477 9) (J30 2)   13  Snoring (786 09) (R06 83)   14  Tear of lateral meniscus of knee, left, initial encounter   15  Tear of medial meniscus of left knee, subsequent encounter    Current Meds   1  Allegra CAPS; Therapy: (Recorded:47Jij3561) to Recorded   2  MetFORMIN HCl - 500 MG Oral Tablet; TAKE 1 TABLET ONCE DAILY WITH A MEAL; Therapy: 28Apr2016 to (Evaluate:69Emw7271)  Requested for: 28Apr2016; Last   Rx:28Apr2016 Ordered   3  MethylPREDNISolone Acetate 40 MG/ML Injection Suspension; USE AS DIRECTED; To   Be Done: 19IVL1629; Status: HOLD FOR - Administration Ordered   4  MoviPrep 100 GM Oral Solution Reconstituted; USE AS DIRECTED; Therapy: 70WTF9265 to (Last Rx:15Jun2016)  Requested for: 42DHC9320 Ordered   5  SLPG Magic Mouthwash 1:1:1 maalox/diphenhydramine/lidocaine; swish, gargle and spit   as needed for oral pain; Therapy: 01Apr2016 to (Last Rx:01Apr2016); Status: ACTIVE - Renewal Voided Ordered   6  ValACYclovir HCl - 500 MG Oral Tablet; TAKE 1 TABLET TWICE DAILY at onset of   lesions/burning feeling in mouth; Therapy: 01Apr2016 to (Shree Navas)  Requested for: 28Apr2016; Last   Rx:28Apr2016 Ordered    Allergies    1  No Known Drug Allergies    Assessment    1   Flu vaccine need (V04 81) (Z23)    Plan  Flu vaccine need · Fluzone Quadrivalent 0 5 ML Intramuscular Suspension    Future Appointments    Date/Time Provider Specialty Site   10/26/2016 07:00 PM ARMOND Swain [facility]   11/03/2016 03:40 PM ARMOND Matute   Orthopedic Surgery [address]     Signatures   Electronically signed by : ARMOND Rod ; Oct 21 2016  1:18PM EST                       (Author)

## 2018-01-12 NOTE — CONSULTS
Chief Complaint  Pre-op clearance for (L) knee arthroscopy  History of Present Illness  Pre-Op Visit (Brief): The patient is being seen for a preoperative visit  The procedure is a(n) left knee arthroscopy  Surgical Risk Assessment:   Prior Anesthesia: He had prior anesthesia, no prior adverse reaction to edidural anesthesia and no prior adverse reaction to general anesthesia  Pertinent Past Medical History: Diabetes--diet controlled  Exercise Capacity: able to walk four blocks without symptoms and able to walk two flights of stairs without symptoms  Lifestyle Factors: denies tobacco use  Symptoms: no easy bleeding, no easy bruising, no frequent nosebleeds, no chest pain, no cough, no dyspnea, no edema, no palpitations and no wheezing  Pertinent Family History: no pertinent family history  Living Situation: home is secure and supportive and no post-op concerns with his living situation  HPI: 45 y/o male here for preoperative evaluation for left knee arthroscopy  States that he has overall been doing well  Complains only of some sore throat  No other concerns  Review of Systems    Constitutional: No fever or chills, feels well, no tiredness, no recent weight gain or weight loss  ENT: sore throat, but as noted in HPI  Cardiovascular: No complaints of slow heart rate, no fast heart rate, no chest pain, no palpitations, no leg claudication, no lower extremity  Respiratory: No complaints of shortness of breath, no wheezing, no cough, no SOB on exertion, no orthopnea or PND  Gastrointestinal: No complaints of abdominal pain, no constipation, no nausea or vomiting, no diarrhea or bloody stools  Musculoskeletal: left knee pain  Integumentary: No complaints of skin rash or skin lesions, no itching, no skin wound, no dry skin  Neurological: No compliants of headache, no confusion, no convulsions, no numbness or tingling, no dizziness or fainting, no limb weakness, no difficulty walking  Active Problems    1  Acute pharyngitis, unspecified etiology (462) (J02 9)   2  Diabetes mellitus (250 00) (E11 9)   3  Effusion of left knee (719 06) (M25 462)   4  Effusion of left prepatellar bursa (719 06) (M25 462)   5  Infection of mouth (528 9) (K12 2)   6  Knee pain, left (719 46) (M25 562)   7  Pain in knee (719 46) (M25 569)   8  Seasonal allergies (477 9) (J30 2)   9  Tear of lateral meniscus of knee, left, initial encounter (836 1) (S83 282A)   10  Tear of medial meniscus of left knee, subsequent encounter (V58 89,836 0) (G93 405O)    Surgical History    · History of Femur Repair   · History of Hernia Repair    Family History    · Family history of diabetes mellitus (V18 0) (Z83 3)   · Family history of kidney disease (V18 69) (Z84 1)    · Family history of diabetes mellitus (V18 0) (Z83 3)    Social History    · Denied: History of Drug use   ·    · Never a smoker   · Non-smoker (V49 89) (Z78 9)   · Denied: History of Sexual history   · Social alcohol use (F10 99)    Current Meds   1  Afrin NoDrip Original 0 05 % Nasal Solution; USE 1 SPRAY IN EACH NOSTRIL TWICE   DAILY; Therapy: 54FFJ4976 to (Evaluate:97Wwe2658)  Requested for: 15YKC8766; Last   Rx:03Nov2015 Ordered   2  Allegra CAPS; Therapy: (Recorded:68Per8061) to Recorded   3  [employer]S Allergy Relief 10 MG Oral Tablet; TAKE 1 TABLET DAILY AS NEEDED; Therapy: 91OAZ4644 to (Evaluate:17Nfq3253)  Requested for: 84GYA7451; Last   Rx:03Nov2015 Ordered   4  SLPG Magic Mouthwash 1:1:1 maalox/diphenhydramine/lidocaine; Please dispense a 7   day supply; Therapy: 97UYT4764 to (Evaluate:79Lqq0027); Last Rx:32Cue9037 Ordered    Allergies    1   No Known Drug Allergies    Vitals  Signs [Data Includes: Current Encounter]    Heart Rate: 75  Systolic: 258  Diastolic: 70  Height: 5 ft 8 in  Weight: 207 lb   BMI Calculated: 31 47  BSA Calculated: 2 07  O2 Saturation: 98    Physical Exam    Constitutional   General appearance: No acute distress, well appearing and well nourished  Eyes   Conjunctiva and lids: No erythema, swelling or discharge  Pupils and irises: Equal, round, reactive to light  Ophthalmoscopic examination: Normal fundi and optic discs  Ears, Nose, Mouth, and Throat   Nasal mucosa, septum, and turbinates: Normal without edema or erythema  Oropharynx: Normal with no erythema, edema, exudate or lesions  Neck   Neck: Supple, symmetric, trachea midline, no masses  Thyroid: Normal, no thyromegaly  Pulmonary   Respiratory effort: No increased work of breathing or signs of respiratory distress  Auscultation of lungs: Clear to auscultation  Cardiovascular   Auscultation of heart: Normal rate and rhythm, normal S1 and S2, no murmurs  Carotid pulses: 2+ bilaterally  Examination of extremities for edema and/or varicosities: Normal     Abdomen   Abdomen: Non-tender, no masses  Lymphatic   Palpation of lymph nodes in neck: No lymphadenopathy  Musculoskeletal   Gait and station: Normal     Inspection/palpation of joints, bones, and muscles: Normal     Muscle strength/tone: Normal     Skin   Skin and subcutaneous tissue: Normal without rashes or lesions  Psychiatric   Orientation to person, place and time: Normal     Mood and affect: Normal        Results/Data    Rhythm and rate:  normal sinus rhythm  Comparison to prior ECGs: no prior ECGs were available for comparison  Assessment    1  Tear of medial meniscus of left knee, subsequent encounter (V58 89,836 0) (V49 986W)    Plan  Seasonal allergies    · Fluticasone Propionate 50 MCG/ACT Nasal Suspension; USE 1 TO 2 SPRAYS IN  EACH NOSTRIL ONCE DAILY    Discussion/Summary  Surgical Clearance: He is at a LOW risk from a cardiovascular standpoint at this time without any additional cardiac testing  Reevaluation needed, if he should present with symptoms prior to surgery/procedure  Surgical clearance faxed to Dr Dr Dung Botello   End of Encounter Meds    1  Afrin NoDrip Original 0 05 % Nasal Solution; USE 1 SPRAY IN EACH NOSTRIL TWICE   DAILY; Therapy: 35CRO5770 to (Evaluate:69Fgs7157)  Requested for: 47AFU6019; Last   Rx:03Nov2015 Ordered   2  CVS Allergy Relief 10 MG Oral Tablet; TAKE 1 TABLET DAILY AS NEEDED; Therapy: 28UYS3070 to (Evaluate:14Joo1962)  Requested for: 54SIK6982; Last   Rx:03Nov2015 Ordered    3  SLPG Magic Mouthwash 1:1:1 maalox/diphenhydramine/lidocaine; Please dispense a 7   day supply; Therapy: 85QQY3154 to (Evaluate:15Dec2015); Last Rx:08Dec2015 Ordered    4  Fluticasone Propionate 50 MCG/ACT Nasal Suspension; USE 1 TO 2 SPRAYS IN EACH   NOSTRIL ONCE DAILY; Therapy: 48CIO6055 to (Last Rx:26Jan2016) Ordered    5  Allegra CAPS;    Therapy: ([phone]) to Recorded    Signatures   Electronically signed by : ARMOND Meraz ; Jan 26 2016 10:42AM EST                       (Author)

## 2018-01-13 VITALS
BODY MASS INDEX: 31.71 KG/M2 | DIASTOLIC BLOOD PRESSURE: 75 MMHG | SYSTOLIC BLOOD PRESSURE: 121 MMHG | WEIGHT: 202 LBS | HEART RATE: 96 BPM | HEIGHT: 67 IN

## 2018-01-13 VITALS
BODY MASS INDEX: 29.82 KG/M2 | HEART RATE: 84 BPM | RESPIRATION RATE: 18 BRPM | DIASTOLIC BLOOD PRESSURE: 88 MMHG | WEIGHT: 190.38 LBS | SYSTOLIC BLOOD PRESSURE: 128 MMHG

## 2018-01-13 VITALS
HEIGHT: 78 IN | SYSTOLIC BLOOD PRESSURE: 109 MMHG | HEART RATE: 80 BPM | BODY MASS INDEX: 22.1 KG/M2 | DIASTOLIC BLOOD PRESSURE: 75 MMHG | WEIGHT: 191 LBS

## 2018-01-13 VITALS
WEIGHT: 192 LBS | HEART RATE: 97 BPM | SYSTOLIC BLOOD PRESSURE: 111 MMHG | BODY MASS INDEX: 30.13 KG/M2 | DIASTOLIC BLOOD PRESSURE: 77 MMHG | HEIGHT: 67 IN

## 2018-01-13 NOTE — PROGRESS NOTES
Assessment    1  Tear of medial meniscus of left knee, subsequent encounter (V58 89,836 0) (D13 531X)    Plan  Seasonal allergies    · Fluticasone Propionate 50 MCG/ACT Nasal Suspension; USE 1 TO 2 SPRAYS IN  EACH NOSTRIL ONCE DAILY    Discussion/Summary  Surgical Clearance: He is at a LOW risk from a cardiovascular standpoint at this time without any additional cardiac testing  Reevaluation needed, if he should present with symptoms prior to surgery/procedure  Surgical clearance faxed to Dr Dr Marilin Rowell   Chief Complaint  Pre-op clearance for (L) knee arthroscopy  History of Present Illness  Pre-Op Visit (Brief): The patient is being seen for a preoperative visit  The procedure is a(n) left knee arthroscopy  Surgical Risk Assessment:   Prior Anesthesia: He had prior anesthesia, no prior adverse reaction to edidural anesthesia and no prior adverse reaction to general anesthesia  Pertinent Past Medical History: Diabetes--diet controlled  Exercise Capacity: able to walk four blocks without symptoms and able to walk two flights of stairs without symptoms  Lifestyle Factors: denies tobacco use  Symptoms: no easy bleeding, no easy bruising, no frequent nosebleeds, no chest pain, no cough, no dyspnea, no edema, no palpitations and no wheezing  Pertinent Family History: no pertinent family history  Living Situation: home is secure and supportive and no post-op concerns with his living situation  HPI: 43 y/o male here for preoperative evaluation for left knee arthroscopy  States that he has overall been doing well  Complains only of some sore throat  No other concerns  Review of Systems    Constitutional: No fever or chills, feels well, no tiredness, no recent weight gain or weight loss  ENT: sore throat, but as noted in HPI  Cardiovascular: No complaints of slow heart rate, no fast heart rate, no chest pain, no palpitations, no leg claudication, no lower extremity     Respiratory: No complaints of shortness of breath, no wheezing, no cough, no SOB on exertion, no orthopnea or PND  Gastrointestinal: No complaints of abdominal pain, no constipation, no nausea or vomiting, no diarrhea or bloody stools  Musculoskeletal: left knee pain  Integumentary: No complaints of skin rash or skin lesions, no itching, no skin wound, no dry skin  Neurological: No compliants of headache, no confusion, no convulsions, no numbness or tingling, no dizziness or fainting, no limb weakness, no difficulty walking  Active Problems    1  Acute pharyngitis, unspecified etiology (462) (J02 9)   2  Diabetes mellitus (250 00) (E11 9)   3  Effusion of left knee (719 06) (M25 462)   4  Effusion of left prepatellar bursa (719 06) (M25 462)   5  Infection of mouth (528 9) (K12 2)   6  Knee pain, left (719 46) (M25 562)   7  Pain in knee (719 46) (M25 569)   8  Seasonal allergies (477 9) (J30 2)   9  Tear of lateral meniscus of knee, left, initial encounter (836 1) (S83 282A)   10  Tear of medial meniscus of left knee, subsequent encounter (V58 89,836 0) (O75 123U)    Surgical History    · History of Femur Repair   · History of Hernia Repair    Family History    · Family history of diabetes mellitus (V18 0) (Z83 3)   · Family history of kidney disease (V18 69) (Z84 1)    · Family history of diabetes mellitus (V18 0) (Z83 3)    Social History    · Denied: History of Drug use   ·    · Never a smoker   · Non-smoker (V49 89) (Z78 9)   · Denied: History of Sexual history   · Social alcohol use (F10 99)    Current Meds   1  Afrin NoDrip Original 0 05 % Nasal Solution; USE 1 SPRAY IN EACH NOSTRIL TWICE   DAILY; Therapy: 38ATC8771 to (Evaluate:27Kdm0724)  Requested for: 29YEP7022; Last   Rx:03Nov2015 Ordered   2  Allegra CAPS; Therapy: (Recorded:35Uck0404) to Recorded   3  CVS Allergy Relief 10 MG Oral Tablet; TAKE 1 TABLET DAILY AS NEEDED; Therapy: 86IBI1363 to (Evaluate:54Tot7426)  Requested for: 65VJN9939;  Last Rx:85Cld7566 Ordered   4  SLPG Magic Mouthwash 1:1:1 maalox/diphenhydramine/lidocaine; Please dispense a 7   day supply; Therapy: 68UDW9618 to (Evaluate:32Oiv2620); Last Rx:11Gho0708 Ordered    Allergies    1  No Known Drug Allergies    Vitals   Recorded: 04CFB6063 10:18AM   Heart Rate 75   Systolic 414   Diastolic 70   Height 5 ft 8 in   Weight 207 lb    BMI Calculated 31 47   BSA Calculated 2 07   O2 Saturation 98     Physical Exam    Constitutional   General appearance: No acute distress, well appearing and well nourished  Eyes   Conjunctiva and lids: No erythema, swelling or discharge  Pupils and irises: Equal, round, reactive to light  Ophthalmoscopic examination: Normal fundi and optic discs  Ears, Nose, Mouth, and Throat   Nasal mucosa, septum, and turbinates: Normal without edema or erythema  Oropharynx: Normal with no erythema, edema, exudate or lesions  Neck   Neck: Supple, symmetric, trachea midline, no masses  Thyroid: Normal, no thyromegaly  Pulmonary   Respiratory effort: No increased work of breathing or signs of respiratory distress  Auscultation of lungs: Clear to auscultation  Cardiovascular   Auscultation of heart: Normal rate and rhythm, normal S1 and S2, no murmurs  Carotid pulses: 2+ bilaterally  Examination of extremities for edema and/or varicosities: Normal     Abdomen   Abdomen: Non-tender, no masses  Lymphatic   Palpation of lymph nodes in neck: No lymphadenopathy  Musculoskeletal   Gait and station: Normal     Inspection/palpation of joints, bones, and muscles: Normal     Muscle strength/tone: Normal     Skin   Skin and subcutaneous tissue: Normal without rashes or lesions  Psychiatric   Orientation to person, place and time: Normal     Mood and affect: Normal        Results/Data    Rhythm and rate:  normal sinus rhythm  Comparison to prior ECGs: no prior ECGs were available for comparison  End of Encounter Meds    1   Afrin NoDrip Original 0 05 % Nasal Solution; USE 1 SPRAY IN EACH NOSTRIL TWICE   DAILY; Therapy: 36OUD2877 to (Evaluate:03Dec2015)  Requested for: 19JPQ3875; Last   Rx:03Nov2015 Ordered   2  CVS Allergy Relief 10 MG Oral Tablet; TAKE 1 TABLET DAILY AS NEEDED; Therapy: 13PZN3970 to (Evaluate:03Dec2015)  Requested for: 69GXP3792; Last   Rx:03Nov2015 Ordered    3  SLPG Magic Mouthwash 1:1:1 maalox/diphenhydramine/lidocaine; Please dispense a 7   day supply; Therapy: 25EVL4495 to (Evaluate:15Dec2015); Last Rx:08Dec2015 Ordered    4  Fluticasone Propionate 50 MCG/ACT Nasal Suspension; USE 1 TO 2 SPRAYS IN EACH   NOSTRIL ONCE DAILY; Therapy: 32GIT0727 to (Last Rx:26Jan2016) Ordered    5  Allegra CAPS; Therapy: (4180 7834) to Recorded    Future Appointments    Date/Time Provider Specialty Site   02/11/2016 02:00 PM ARMOND Ocampo  Orthopedic Surgery [facility]   02/01/2016 10:00 AM ARMOND Kennedy   [address] [place] [address]     Signatures   Electronically signed by : ARMOND Kenney ; Jan 26 2016 10:42AM EST                       (Author)

## 2018-01-14 VITALS
WEIGHT: 191.13 LBS | HEART RATE: 82 BPM | BODY MASS INDEX: 30.72 KG/M2 | HEIGHT: 66 IN | SYSTOLIC BLOOD PRESSURE: 133 MMHG | DIASTOLIC BLOOD PRESSURE: 90 MMHG

## 2018-01-14 VITALS
HEIGHT: 67 IN | RESPIRATION RATE: 18 BRPM | BODY MASS INDEX: 29.98 KG/M2 | WEIGHT: 191 LBS | HEART RATE: 84 BPM | TEMPERATURE: 97.8 F | DIASTOLIC BLOOD PRESSURE: 70 MMHG | SYSTOLIC BLOOD PRESSURE: 110 MMHG | OXYGEN SATURATION: 98 %

## 2018-01-14 VITALS
HEART RATE: 95 BPM | DIASTOLIC BLOOD PRESSURE: 70 MMHG | HEIGHT: 67 IN | OXYGEN SATURATION: 98 % | TEMPERATURE: 98.1 F | WEIGHT: 191 LBS | BODY MASS INDEX: 29.98 KG/M2 | SYSTOLIC BLOOD PRESSURE: 100 MMHG

## 2018-01-14 VITALS
SYSTOLIC BLOOD PRESSURE: 112 MMHG | HEART RATE: 90 BPM | BODY MASS INDEX: 30.45 KG/M2 | DIASTOLIC BLOOD PRESSURE: 70 MMHG | OXYGEN SATURATION: 97 % | HEIGHT: 67 IN | TEMPERATURE: 98.1 F | WEIGHT: 194 LBS

## 2018-01-14 VITALS
DIASTOLIC BLOOD PRESSURE: 66 MMHG | BODY MASS INDEX: 29.78 KG/M2 | HEART RATE: 84 BPM | WEIGHT: 190.13 LBS | SYSTOLIC BLOOD PRESSURE: 99 MMHG | RESPIRATION RATE: 18 BRPM

## 2018-01-16 NOTE — CONSULTS
Chief Complaint    1  Finger Problem    Active Problems    1  Aftercare following surgery (V58 89) (Z48 89)   2  Controlled diabetes mellitus (250 00) (E11 9)   3  Effusion of left knee (719 06) (M25 462)   4  Effusion of left prepatellar bursa (719 06) (M25 462)   5  Encounter for immunization (V03 89) (Z23)   6  Encounter for screening colonoscopy (V76 51) (Z12 11)   7  Flu vaccine need (V04 81) (Z23)   8  Hamstring strain, right, initial encounter   9  Hyperlipidemia (272 4) (E78 5)   10  Knee mass, right (719 66) (M25 861)   11  Localized primary osteoarthritis of lower leg, left (715 16) (M17 12)   12  Low back pain (724 2) (M54 5)   13  Lumbar radiculopathy (724 4) (M54 16)   14  Mouth lesion (528 9) (K13 70)   15  Need for Tdap vaccination (V06 1) (Z23)   16  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) (M19 049)   17  Obesity (278 00) (E66 9)   18  Pain in left knee (719 46) (M25 562)   19  Painful knee (719 46) (M25 569)   20  Poison ivy dermatitis (692 6) (L23 7)   21  Posterior knee pain, right (719 46) (M25 561)   22  Seasonal allergies (477 9) (J30 2)   23  Severe sleep apnea (780 57) (G47 30)   24  Snoring (786 09) (R06 83)   25  Tear of lateral meniscus of knee, left, initial encounter   26  Tear of medial meniscus of left knee, subsequent encounter (V58 89,836 0) (S83 242D)   27  Trigger finger, unspecified finger, unspecified laterality (727 03) (M65 30)   28  Trigger index finger of left hand (727 03) (M65 322)   29   Trigger index finger of right hand (727 03) (M65 321)    Past Medical History    · History of influenza vaccination (V49 89) (Z92 29)    Surgical History    · History of Femur Repair   · History of Hernia Repair   · History of Knee Arthroscopy With Medial Meniscus Repair    Family History    · Family history of diabetes mellitus (V18 0) (Z83 3)   · Family history of kidney disease (V18 69) (Z84 1)    · Family history of diabetes mellitus (V18 0) (Z83 3)    Social History    · Denied: History of Drug use   · Full-time employment   · [employer]   ·    · Never a smoker   · Non-smoker (V49 89) (Z78 9)   · Denied: History of Sexual history   · Social alcohol use (Z78 9)    Current Meds   1  Allegra CAPS; Therapy: (Recorded:81Fll7756) to Recorded   2  Atorvastatin Calcium 20 MG Oral Tablet; take one tablet at bedtime; Therapy: 48MAV8100 to (Evaluate:14Oct2017)  Requested for: 92Iwi1950; Last   Rx:99Eih4315 Ordered   3  Cyclobenzaprine HCl - 5 MG Oral Tablet; Take one tablet three times daily as needed for   pain; Therapy: 46XKZ3022 to (Last Rx:17Aug2017)  Requested for: 54Qsa2970 Ordered   4  Fluticasone Propionate 50 MCG/ACT Nasal Suspension; USE 2 SPRAYS IN EACH   NOSTRIL ONCE DAILY; Therapy: 18TON3463 to Recorded   5  Meloxicam 7 5 MG Oral Tablet; TAKE 1 TABLET TWICE DAILY WITH FOOD; Therapy: 74RGJ9562 to (Emmett Trejo)  Requested for: 56ZXA0477; Last   Rx:05Oct2017 Ordered   6  MetFORMIN HCl - 500 MG Oral Tablet; take 1 tablet once daily with a meal;   Therapy: 89Svd5836 to (Evaluate:12Jan2018)  Requested for: 21Tvy1014; Last   Rx:11Iwz6459 Ordered   7  Naproxen 500 MG Oral Tablet; TAKE 1 TABLET EVERY 12 HOURS WITH FOOD; Therapy: 53KQE9613 to [phone])  Requested for: 34FNG8138; Last   Rx:02Yhh2012 Ordered    Allergies    1  No Known Drug Allergies    Vitals  Signs    Heart Rate: 47UTZWKBDB: 391MRXOVSVKJ: 53VKSNLL: 5 ft 6 inWeight: 191 lb 2 ozBMI Calculated: 30 85BSA Calculated: 1 96    Assessment    1   Trigger finger, unspecified finger, unspecified laterality (727 03) (M65 30)    Plan     Trigger finger, unspecified finger, unspecified laterality    · Follow Up After Surgery Evaluation and Treatment  Follow-up  Status: Hold For -  Scheduling  Requested for: 55QGP1372   · Please refer to the patient information sheet that was provided at your office visit today for  information on  your current condition ; Status:Complete;   Done: 18VVX1889 02: 26PM    Discussion/Summary    Assessment: Bilateral trigger fingers index, long, ring, and small fingers  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    Plan: At this point, patient wishes surgical release of the left hand index and small finger  Patient would like cortisone injections to the long and ring  We will observe the right hand  The anatomy and physiology of trigger finger was discussed with the patient today in the office  Edema and increased contact pressure within the flexor tendons at the A1 pulley can cause pain, crepitation, and limitation of function  Treatment options include resting MP blocking splints to decrease edema, oral anti-inflammatory medications, home or formal therapy exercises, up to 2 steroid injections or surgical release  While majority of patients do respond to conservative treatment, up to 20% may require surgical release  The patient has elected release of the left hand index and small finger trigger finger with injection into the left hand long and ring finger trigger finger  The patient has elected to undergo a release of the A1 pulley (trigger finger)  A small incision will be made over the palmar aspect of the hand, the tendon sheath holding the flexor tendons will be released  In the postoperative period, light activities are allowed immediately, driving is allowed when narcotic medication has stopped, and the incision may get wet after 2 days  Heavy activities (lifting more than approximately 10 pounds) will be allowed after the follow up appointment in 1-2 weeks  While the pain and discomfort within the wrist typically improves rapidly, some residual discomfort may be present for up to 6 weeks  The nodule that is typically palpable in the palmar aspect of the hand will not be removed, as this would necessitate removal of a portion of the flexor tendon, however the catching, clicking, and locking should resolve  Approximate success rate is 98%      The risks and benefits of the procedure were explained to the patient, which include, but are not limited to: Bleeding, infection, recurrence, pain, scar, damage to tendons, damage to nerves, and damage to blood vessels, need for future surgery and complications related to anesthesia  If bony work is done, risks also include malunion and nonunion  These risks, along with alternative conservative treatment options, and postoperative protocols were voiced back and understood by the patient  All questions were answered to the patient's satisfaction  The patient agrees to comply with a standard postoperative protocol, and is willing to proceed  Education was provided via written and auditory forms  There were no barriers to learning  Written handouts regarding wound care, incision and scar care, and general preoperative information, as well as risks and benefits were provided to the patient  This note was dictated with dictation software  As such, and may contain typographical errors, improperly dictated words, background noise, or other errors  HPI:  The patient presents evaluation bilateral hand trigger fingers index, long, ring, and small fingers well as PIP joint arthritis  Mild to moderate pain with catching popping locking aching in nature without numbness tingling fevers chills been ongoing for months  The past medical history, medications, allergies, and review of systems have been read and reviewed on the chart and have been updated  Review of Systems:  Constitutional: Negative  Skin: Negative except as above  Musculoskeletal: As above  Neurologic: Negative except as above  Psychiatric: Negative    Examination:    General / Psych: Awake and Oriented, No acute distress, age appropriate  HEENT: Normocephalic, atraumatic, mucous membranes moist, neck supple, trachea midline    Abdomen: No rebound or signs of guarding  Cardio: No discernible arrhythmia, 2+ pulses with good capillary refill  Pulmonary: No audible wheezing or audible stridor  Neurologic: [The patient is neurovascularly intact in the median, ulnar, and radial nerve distribution  There is normal sensation and good capillary refill within the digits  2+ pulses ]  Skin: [No lesions, rashes, streaking, purulence, abscesses, lymphangitis]  Musculoskeletal: Tenderness to palpation PIP joints index through small bilaterally, catching popping clicking locking bilateral hand index through small finger   No instability, full range of motion    Diagnostic Studies: [none]     Signatures   Electronically signed by : ARMOND Elizabeth ; Oct 25 2017  2:27PM EST                       (Author)

## 2018-01-17 NOTE — PROGRESS NOTES
Assessment    1  Controlled diabetes mellitus (250 00) (E11 9)   2  Obesity (278 00) (E66 9)   3  Hyperlipidemia (272 4) (E78 5)    Plan  Controlled diabetes mellitus, Hyperlipidemia, Obesity    · (1) BASIC METABOLIC PROFILE; Status:Active; Requested for:09Nov2017;    · (1) HEMOGLOBIN A1C; Status:Active; Requested for:09Nov2017;    · (1) MICROALBUMIN CREATININE RATIO, RANDOM URINE; Status:Active; Requested  for:09Nov2017;   Seasonal allergies    · Fluticasone Propionate 50 MCG/ACT Nasal Suspension; USE 2 SPRAYS IN  EACH NOSTRIL ONCE DAILY    Discussion/Summary    Cond stable  cont regimen  FU in 6 months  discuss with pt to contact [address] [place] [address], or  to change his insurance policy or any other options with lower deductible  He should also contact his insurance directly to see if surgeries will be covered  The patient was counseled regarding diagnostic results, instructions for management, risk factor reductions, impressions  total time of encounter was 30 minutes  The treatment plan was reviewed with the patient/guardian  The patient/guardian understands and agrees with the treatment plan      Chief Complaint  pt here for follow up visit  pt needs eye exam  pt wants to talk you to his hand surgery         History of Present Illness  Pt comes for chronic condition follow up  He is doing exercise, has lost 2lbs  His DM is stable on current regimen  Cholesterol stable  he is getting his trigger fingers repaired in both hands in February, he is somewhat reluctant due to high deductible in his insurance of $10,000  Review of Systems    Constitutional: No fever or chills, feels well, no tiredness, no recent weight gain or weight loss  ENT: no complaints of earache, no hearing loss, no nosebleeds, no nasal discharge, no sore throat, no hoarseness  Cardiovascular: No complaints of slow heart rate, no fast heart rate, no chest pain, no palpitations, no leg claudication, no lower extremity  Respiratory: No complaints of shortness of breath, no wheezing, no cough, no SOB on exertion, no orthopnea or PND  Gastrointestinal: No complaints of abdominal pain, no constipation, no nausea or vomiting, no diarrhea or bloody stools  Genitourinary: No complaints of dysuria, no incontinence, no hesitancy, no nocturia, no genital lesion, no testicular pain  Musculoskeletal: arthralgias and limb pain, but as noted in HPI  Integumentary: No complaints of skin rash or skin lesions, no itching, no skin wound, no dry skin  Active Problems    1  Aftercare following surgery (V58 89) (Z48 89)   2  Controlled diabetes mellitus (250 00) (E11 9)   3  Effusion of left knee (719 06) (M25 462)   4  Effusion of left prepatellar bursa (719 06) (M25 462)   5  Encounter for immunization (V03 89) (Z23)   6  Encounter for screening colonoscopy (V76 51) (Z12 11)   7  Flu vaccine need (V04 81) (Z23)   8  Hamstring strain, right, initial encounter   9  Hyperlipidemia (272 4) (E78 5)   10  Knee mass, right (719 66) (M25 861)   11  Localized primary osteoarthritis of lower leg, left (715 16) (M17 12)   12  Low back pain (724 2) (M54 5)   13  Lumbar radiculopathy (724 4) (M54 16)   14  Mouth lesion (528 9) (K13 70)   15  Need for Tdap vaccination (V06 1) (Z23)   16  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) (M19 049)   17  Obesity (278 00) (E66 9)   18  Pain in left knee (719 46) (M25 562)   19  Painful knee (719 46) (M25 569)   20  Posterior knee pain, right (719 46) (M25 561)   21  Seasonal allergies (477 9) (J30 2)   22  Severe sleep apnea (780 57) (G47 30)   23  Snoring (786 09) (R06 83)   24  Tear of lateral meniscus of knee, left, initial encounter   25  Tear of medial meniscus of left knee, subsequent encounter (V58 89,836 0) (S83 242D)   26  Trigger finger, unspecified finger, unspecified laterality (727 03) (M65 30)   27  Trigger index finger of left hand (727 03) (O12 747)   28   Trigger index finger of right hand (727 03) (M65 321)    Past Medical History    1  History of influenza vaccination (V49 89) (Z92 29)    The active problems and past medical history were reviewed and updated today  Surgical History    1  History of Femur Repair   2  History of Hernia Repair   3  History of Knee Arthroscopy With Medial Meniscus Repair    Family History  Mother    1  Family history of diabetes mellitus (V18 0) (Z83 3)   2  Family history of kidney disease (V18 69) (Z84 1)  Father    3  Family history of diabetes mellitus (V18 0) (Z83 3)    Social History    · Denied: History of Drug use   · Full-time employment   ·    · Never a smoker   · Non-smoker (V49 89) (Z78 9)   · Denied: History of Sexual history   · Social alcohol use (Z78 9)  The social history was reviewed and updated today  Current Meds   1  Allegra CAPS; Therapy: (Recorded:55Cgl9422) to Recorded   2  Atorvastatin Calcium 20 MG Oral Tablet; take one tablet at bedtime; Therapy: 55AZK9541 to (Evaluate:14Oct2017)  Requested for: 50Xko4281; Last   Rx:42Nuc8900 Ordered   3  Cyclobenzaprine HCl - 5 MG Oral Tablet; Take one tablet three times daily as needed for   pain; Therapy: 93SFY4435 to (Last Rx:17Aug2017)  Requested for: 49Pbf1545 Ordered   4  Fluticasone Propionate 50 MCG/ACT Nasal Suspension; USE 2 SPRAYS IN EACH   NOSTRIL ONCE DAILY; Therapy: 05PHH1590 to Recorded   5  Meloxicam 7 5 MG Oral Tablet; take 1 tablet twice daily with food; Therapy: 48FHY2740 to (Evaluate:25Xra1654)  Requested for: 91NLW7871; Last   Rx:06Nov2017 Ordered   6  MetFORMIN HCl - 500 MG Oral Tablet; take 1 tablet once daily with a meal;   Therapy: 64Jrj9644 to (Evaluate:12Jan2018)  Requested for: 00Vmt8518; Last   Rx:79Wvs6787 Ordered   7  Naproxen 500 MG Oral Tablet; TAKE 1 TABLET EVERY 12 HOURS WITH FOOD; Therapy: 37KZO4632 to (77 034 135)  Requested for: 35MHB5727; Last   Rx:26Dhg5184 Ordered    The medication list was reviewed and updated today  Allergies    1  No Known Drug Allergies    Vitals  Vital Signs    Recorded: 22ULC6873 08:10AM   Heart Rate 97   Respiration 16   Systolic 665   Diastolic 80   Height 5 ft 7 in   Weight 189 lb    BMI Calculated 29 6   BSA Calculated 1 97   O2 Saturation 98     Physical Exam    Constitutional   General appearance: No acute distress, well appearing and well nourished  Pulmonary   Respiratory effort: No increased work of breathing or signs of respiratory distress  Auscultation of lungs: Clear to auscultation, equal breath sounds bilaterally, no wheezes, no rales, no rhonci  Cardiovascular   Auscultation of heart: Normal rate and rhythm, normal S1 and S2, without murmurs  Examination of extremities for edema and/or varicosities: Normal     Musculoskeletal   Gait and station: Normal     Psychiatric   Mood and affect: Normal          Health Management  Encounter for screening colonoscopy   COLONOSCOPY (GI, SURG); every 5 years; Last 47VFR0597; Next Due: 55Kru4929; Active    Future Appointments    Date/Time Provider Specialty Site   05/09/2018 05:00 PM ARMOND [facility]   [facility]14[facility]2018 03[facility]00 [facility] [address]   02/06/2018 08:30 AM ARMOND Sams  [address] [place] [address] OR   11/28/2017 04:50 PM ARMOND Edwards   Orthopedic Surgery [address]     Signatures   Electronically signed by : ARMOND Reyna ; Nov 9 2017  9:02AM EST                       (Author)

## 2018-01-17 NOTE — PROGRESS NOTES
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  We'll schedule him for RIGHT INDEX finger trigger release under STRAIGHT local anesthesia  We will schedule the LEFT INDEX finger trigger release one to 2 weeks after the right  I will see him postoperatively  Chief Complaint    1  Finger Problem  Bilateral index finger pain and locking      History of Present Illness  HPI: Kevin Willis is a 47-year-old right-hand-dominant male complaining of right greater than left index finger pain, stiffness, locking  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  He admits to history of trigger digits status post cortisone injection about 5 years ago  He did get significant relief however over the last year his symptoms have returned  He does awaken in the morning with his fingers locked  Review of Systems    Constitutional: No fever or chills, feels well, no tiredness, no recent weight loss or weight gain  Eyes: No complaints of red eyes, no eyesight problems  ENT: no complaints of loss of hearing, no nosebleeds, no sore throat  Cardiovascular: No complaints of chest pain, no palpitations, no leg claudication or lower extremity edema  Respiratory: No complaints of shortness of breath, no wheezing, no cough  Gastrointestinal: No complaints of abdominal pain, no constipation, no nausea or vomiting, no diarrhea or bloody stools     Genitourinary: No complaints of dysuria or incontinence, no hesitancy, no nocturia  Musculoskeletal: as noted in HPI  Integumentary: No complaints of skin rash or lesion, no itching or dry skin, no skin wounds  Neurological: No complaints of headache, no confusion, no numbness or tingling, no dizziness  Psychiatric: No suicidal thoughts, no anxiety, no depression  Endocrine: No muscle weakness, no frequent urination, no excessive thirst, no feelings of weakness  Active Problems    1  Aftercare following surgery (V58 89) (Z48 89)   2  Diabetes type 2, uncontrolled (250 02) (E11 65)   3  Effusion of left knee (719 06) (M25 462)   4  Effusion of left prepatellar bursa (719 06) (M25 462)   5  Encounter for immunization (V03 89) (Z23)   6  Encounter for screening colonoscopy (V76 51) (Z12 11)   7  Flu vaccine need (V04 81) (Z23)   8  Hamstring strain, right, initial encounter (843 8) (S76 311A)   9  Hyperlipidemia (272 4) (E78 5)   10  Localized primary osteoarthritis of lower leg, left (715 16) (M17 12)   11  Low back pain (724 2) (M54 5)   12  Lumbar radiculopathy (724 4) (M54 16)   13  Mouth lesion (528 9) (K13 70)   14  Need for Tdap vaccination (V06 1) (Z23)   15  Obesity (278 00) (E66 9)   16  Pain in left knee (719 46) (M25 562)   17  Painful knee (719 46) (M25 569)   18  Seasonal allergies (477 9) (J30 2)   19  Snoring (786 09) (R06 83)   20  Tear of lateral meniscus of knee, left, initial encounter   21  Tear of medial meniscus of left knee, subsequent encounter   22   Trigger finger, unspecified finger, unspecified laterality (727 03) (M65 30)    Surgical History    · History of Femur Repair   · History of Hernia Repair   · History of Knee Arthroscopy With Medial Meniscus Repair    Family History    · Family history of diabetes mellitus (V18 0) (Z83 3)   · Family history of kidney disease (V18 69) (Z84 1)    · Family history of diabetes mellitus (V18 0) (Z83 3)    Social History    · Denied: History of Drug use   · Full-time employment   · [employer]   ·    · Never a smoker   · Non-smoker (V49 89) (Z78 9)   · Denied: History of Sexual history   · Social alcohol use (Z78 9)    Current Meds   1  Allegra CAPS; Therapy: (Recorded:60Foo7124) to Recorded   2  Atorvastatin Calcium 20 MG Oral Tablet; TAKE one TABLET At Bedtime; Therapy: 71QAY0456 to (Last [facility])  Requested for: 26Oct2016 Ordered   3  Cyclobenzaprine HCl - 5 MG Oral Tablet; Take one tablet three times daily as needed for   pain; Therapy: 45MMP9126 to (Last Rx:40Pjl0505)  Requested for: 36AIK8865 Ordered   4  MetFORMIN HCl - 500 MG Oral Tablet; take 1 tablet once daily with a meal;   Therapy: 27Vjr2135 to (Evaluate:24Apr2017)  Requested for: 26Oct2016; Last   Rx:33Ezu4560 Ordered   5  MethylPREDNISolone Acetate 40 MG/ML Injection Suspension; USE AS DIRECTED; To   Be Done: 38FXB6361; Status: HOLD FOR - Administration Ordered   6  Naproxen 500 MG Oral Tablet; TAKE 1 TABLET EVERY 12 HOURS WITH FOOD; Therapy: 12WWY4013 to (Evaluate:05Jan2017)  Requested for: 39NHR4054; Last   Rx:02Bcl3246 Ordered    Allergies    1  No Known Drug Allergies    Vitals  Signs    Heart Rate: 97  Systolic: 177  Diastolic: 77  Height: 5 ft 7 in  Weight: 192 lb   BMI Calculated: 30 07  BSA Calculated: 1 99    Physical Exam      General: No acute distress, age-appropriate  Neck: Supple, trachea midline  HEENT: Normocephalic atraumatic, mucous membranes are moist, sclera are nonicteric  Cardiovascular: No discernable arrhythmia  Respiratory: Breathing is even and unlabored, no stridor or audible wheezing  Psychiatric: Awake alert and oriented x3, normal mood and affect  Right hand: There is no swelling or erythema  There is a large palpable nodule the base of the ring index finger at the A1 pulley  There is mild tenderness palpation over this area  There is palpable and visible locking with digital flexion  He otherwise has full range of motion    Sensation is intact distally  Left hand: No swelling or erythema  Palpable nodule at the A1 pulley  Minimal tenderness  Full digital range of motion  Locking with full digital flexion  Sensation is intact distally  Brisk cap refill distally  Future Appointments    Date/Time Provider Specialty Site   04/26/2017 06:30 PM ARMOND [facility]   04/04/2017 10:30 AM [facility]   04/18/2017 01:50 PM ARMOND Luu  Orthopedic Surgery [facility]   04/21/2017 08:00 AM [facility]D [facility]  [facility]   05/02/2017 01:20 PM ARMOND Luu  Orthopedic Surgery [facility] [address]   05/23/2017 04:00 PM ARMOND Reyes  Orthopedic Surgery [address]     Surgery Scheduling Form  Surgery Schedule Form [facility] Standard:   Location:   Confirmation Number:   PROCEDURE DETAILS   Procedure Date:   Requested Time:   Surgeon: Emanuel Banerjee   Co-Surgeon:   [facility] Required:   Bed:   Procedure: RIGHT INDEX finger trigger release   Laterality/Level: Right  Case Length: 30  Anticipated frozen section:   Anesthesia:     Procedure Codes: 88126   Pre-op diagnosis:   Diagnosis Code(s): M65 321   Equipment:   Equipment Needs:   Implants:     Is the patient able to walk up a flight of stairs, walk up a hill or do heavy housework WITHOUT having chest pain or shortness of breath?    REGISTRATION & FINANCIAL CLEARANCE   FA Initials:   Insurance:   Policy Number: Group Number:     PRE-ADMISSION TESTING/CLINICAL INFORMATION   PAT Location:       CONSULTS NEEDED:   Anesthesia Consult:   Medical Consult:   Cardiac Consult:    ALLERGIES AND ALERTS     Latex Allergy:   Penicillin Allergy:   Malignant Hyperthermia:   Diabetic Patient:     COMMENTS   Scheduling Information Provided By:     CASE MANAGEMENT:   2Surgery Schedule Form [facility] Standard:   Location: Confirmation Number:   Procedure Date:   Requested Time:   Surgeon: Lawrence Fierro   Co-Surgeon:   [facility] Required:   Bed:   Anesthesia: Local     PROCEDURE DETAILS   Procedure: LEFT INDEX finger trigger release   Laterality/Level: Left  Anticipated frozen section:   Procedure Codes: 92253   Pre-op diagnosis:   Diagnosis Code(s): M65 322   Case Length: 30    Equipment:   Equipment Needs:   Implants:   Is the patient able to walk up a flight of stairs, walk up a hill or do heavy housework WITHOUT having chest pain or shortness of breath?      REGISTRATION & FINANCIAL CLEARANCE   FA Initials:   Insurance:   Policy Number: Group Number:     PRE-ADMISSION TESTING/CLINICAL INFORMATION   PAT Location:       CONSULTS NEEDED:   Anesthesia Consult:   Medical Consult:   Cardiac Consult:      ALLERGIES AND ALERTS   Latex Allergy:   Penicillin Allergy:   Malignant Hyperthermia:   Diabetic Patient:     COMMENTS   Scheduling Information Provided By:     CASE MANAGEMENT:      Signatures   Electronically signed by : ARMOND Dawson ; Mar  9 2017  4:35PM EST                       (Author)

## 2018-01-17 NOTE — RESULT NOTES
Message   Needs follow up for new onset of DM-2 - asked pt to make appointment w/ DR Christiano palomino to    Discussed HSV 1 outbreak - responding well to antivital course and magic mouth wash/ sxs resolved  Sent another valacyclovir 500 mg BID x 5 days course to pharm to have on hand - instructed him to start at onset of lesions  Verified Results  (Q) HSV 1/2 IGG, HERPESELECT W/REFLEX 2016 10:14AM Katlin Pierson     Test Name Result Flag Reference   HSV 1 IGG TYPE SPECIFIC$AB >5 00 H    HSV 2 IGG TYPE SPECIFIC$AB <0 90     Value          Interpretation                            -----          --------------                            <0 90          Negative                            0  90-1 10      Equivocal                            >1 10          Positive     This assay utilizes recombinant type-specific antigens  to differentiate HSV-1 from HSV-2 infections  A  positive result cannot distinguish between recent and  past infection  If recent HSV infection is suspected  but the results are negative or equivocal, the assay  should be repeated in 4-6 weeks  The performance  characteristics of the assay have not been established  for pediatric populations, immunocompromised patients,  or  screening  (1) COMPREHENSIVE METABOLIC PANEL [id number] 84:83CB Katlin Pierson     Test Name Result Flag Reference   GLUCOSE 114 mg/dL H 65-99   Fasting reference interval   UREA NITROGEN (BUN) 15 mg/dL  7-25   CREATININE 0 78 mg/dL  0 70-1 33   For patients >52years of age, the reference limit  for Creatinine is approximately 13% higher for people  identified as -American  eGFR NON-AFR   AMERICAN 104 mL/min/1 73m2  > OR = 60   eGFR AFRICAN AMERICAN 120 mL/min/1 73m2  > OR = 60   BUN/CREATININE RATIO   0-58   NOT APPLICABLE (calc)   SODIUM 139 mmol/L  135-146   POTASSIUM 4 6 mmol/L  3 5-5 3   CHLORIDE 103 mmol/L     CARBON DIOXIDE 26 mmol/L  19-30   CALCIUM 9 5 mg/dL  8 6-10 3 PROTEIN, TOTAL 7 1 g/dL  6 1-8 1   ALBUMIN 4 2 g/dL  3 6-5 1   GLOBULIN 2 9 g/dL (calc)  1 9-3 7   ALBUMIN/GLOBULIN RATIO 1 4 (calc)  1 0-2 5   BILIRUBIN, TOTAL 0 6 mg/dL  0 2-1 2   ALKALINE PHOSPHATASE 66 U/L     AST 21 U/L  10-35   ALT 23 U/L  9-46     (Q) HEMOGLOBIN A1c 04Apr2016 10:14AM Lali Figueroa   REPORT COMMENT:  FASTING:YES     Test Name Result Flag Reference   HEMOGLOBIN A1c 6 5 % of total Hgb H <5 7   According to ADA guidelines, hemoglobin A1c <7 0%  represents optimal control in non-pregnant diabetic  patients  Different metrics may apply to specific  patient populations  Standards of Medical Care in    Diabetes Care  2013;36:s11-s66     For the purpose of screening for the presence of  diabetes  <5 7%       Consistent with the absence of diabetes  5 7-6 4%    Consistent with increased risk for diabetes              (prediabetes)  >or=6 5%    Consistent with diabetes     This assay result is consistent with diabetes  mellitus  Currently, no consensus exists for use of hemoglobin  A1c for diagnosis of diabetes for children         Plan  Mouth lesion    · ValACYclovir HCl - 500 MG Oral Tablet; TAKE 1 TABLET TWICE DAILY at onset  of lesions/burning feeling in mouth

## 2018-01-18 ENCOUNTER — APPOINTMENT (OUTPATIENT)
Dept: PHYSICAL THERAPY | Facility: REHABILITATION | Age: 53
End: 2018-01-18
Payer: COMMERCIAL

## 2018-01-18 PROCEDURE — 97140 MANUAL THERAPY 1/> REGIONS: CPT

## 2018-01-18 PROCEDURE — 97110 THERAPEUTIC EXERCISES: CPT

## 2018-01-18 NOTE — PROGRESS NOTES
Assessment   1  Localized primary osteoarthritis of lower leg, left (628 98) (M17 12)    Plan   Flu vaccine need, Localized primary osteoarthritis of lower leg, left    · MethylPREDNISolone Acetate 40 MG/ML Injection Suspension; INJECT 2     ML Injection; To Be Done: 79CJH8135   · Follow-up visit in 3 months Evaluation and Treatment  Follow-up  Status: Hold For -    Scheduling  Requested for: 55WJJ7568    Discussion/Summary      78-year-old male left knee pain  Osteoarthritic changes and effusion   knee aspirated and injected  Patient has significant pain relief following aspiration and injection  lower extremity  Ace bandage Ã24 hours  in 3 months time  advised should they develop any increasing pain, redness, numbness, tingling or swelling surrounding the injection sight, they are to contact our office or present to the emergency department  Chief Complaint   1  Knee Pain  Left knee pain and swelling      History of Present Illness   HPI: 78-year-old male, presents to office today regarding left knee pain and swelling patient start months out from a knee arthroscopy has had several fusions drained in his left knee  3 months ago was his last drainage with injection  He states he has had minimal pain in his left knee  However, he continues to have significant swelling limiting his motion in his activities  Pain is localized around his left  He denies any numbness, tingling, or calf pain      Review of Systems        Constitutional: No fever or chills, feels well, no tiredness, no recent weight loss or weight gain  Eyes: No complaints of red eyes, no eyesight problems  ENT: no complaints of loss of hearing, no nosebleeds, no sore throat  Cardiovascular: No complaints of chest pain, no palpitations, no leg claudication or lower extremity edema  Respiratory: No complaints of shortness of breath, no wheezing, no cough        Gastrointestinal: No complaints of abdominal pain, no constipation, no nausea or vomiting, no diarrhea or bloody stools  Genitourinary: No complaints of dysuria or incontinence, no hesitancy, no nocturia  Musculoskeletal: as noted in HPI  Integumentary: No complaints of skin rash or lesion, no itching or dry skin, no skin wounds  Neurological: No complaints of headache, no confusion, no numbness or tingling, no dizziness  Psychiatric: No suicidal thoughts, no anxiety, no depression  Endocrine: No muscle weakness, no frequent urination, no excessive thirst, no feelings of weakness  Active Problems   1  Aftercare following surgery (V58 89) (Z48 89)   2  Diabetes type 2, uncontrolled (250 02) (E11 65)   3  Effusion of left knee (719 06) (M25 462)   4  Effusion of left prepatellar bursa (719 06) (M25 462)   5  Encounter for immunization (V03 89) (Z23)   6  Encounter for screening colonoscopy (V76 51) (Z12 11)   7  Flu vaccine need (V04 81) (Z23)   8  Hyperlipidemia (272 4) (E78 5)   9  Localized primary osteoarthritis of lower leg, left (715 16) (M17 12)   10  Low back pain (724 2) (M54 5)   11  Mouth lesion (528 9) (K13 70)   12  Need for Tdap vaccination (V06 1) (Z23)   13  Obesity (278 00) (E66 9)   14  Pain in left knee (719 46) (M25 562)   15  Seasonal allergies (477 9) (J30 2)   16  Snoring (786 09) (R06 83)   17  Tear of lateral meniscus of knee, left, initial encounter   18  Tear of medial meniscus of left knee, subsequent encounter    Past Medical History      The active problems and past medical history were reviewed and updated today  Surgical History    · History of Femur Repair   · History of Hernia Repair   · History of Knee Arthroscopy With Medial Meniscus Repair     The surgical history was reviewed and updated today         Family History   Mother    · Family history of diabetes mellitus (V18 0) (Z83 3)   · Family history of kidney disease (V18 69) (Z80 3)  Father    · Family history of diabetes mellitus (V18 0) (Z83 3)     The family history was reviewed and updated today  Social History    · Denied: History of Drug use   · Full-time employment   · [employer]   ·    · Never a smoker   · Non-smoker (V49 89) (Z78 9)   · Denied: History of Sexual history   · Social alcohol use (Z78 9)  The social history was reviewed and updated today  Current Meds    1  Allegra CAPS; Therapy: (Recorded:21Yjs1790) to Recorded   2  Atorvastatin Calcium 20 MG Oral Tablet; TAKE one TABLET At Bedtime; Therapy: 95KYM0401 to ([facility])  Requested for: 26Oct2016 Ordered   3  Cyclobenzaprine HCl - 5 MG Oral Tablet; Take one tablet three times daily as needed for     pain; Therapy: 87QIZ4782 to ([facility])  Requested for: 26Oct2016 Ordered   4  MetFORMIN HCl - 500 MG Oral Tablet; take 1 tablet once daily with a meal;     Therapy: 28Apr2016 to (Evaluate:24Apr2017)  Requested for: 26Oct2016; Last     Rx:26Oct2016 Ordered   5  MethylPREDNISolone Acetate 40 MG/ML Injection Suspension; USE AS DIRECTED; To     Be Done: 73RFG5606; Status: HOLD FOR - Administration Ordered   6  Naproxen 500 MG Oral Tablet; TAKE 1 TABLET EVERY 12 HOURS WITH FOOD; Therapy: 80QEC0995 to ([facility])  Requested for: 60ADO1946; Last     Rx:26Oct2016 Ordered   7  ValACYclovir HCl - 500 MG Oral Tablet; TAKE 1 TABLET TWICE DAILY at onset of     lesions/burning feeling in mouth; Therapy: 01Apr2016 to ([facility])  Requested for: 28Apr2016; Last     Rx:62Qyk1843 Ordered     The medication list was reviewed and updated today  Allergies   1   No Known Drug Allergies    Vitals   Signs   Systolic: 549  Diastolic: 74  Heart Rate: 82  Respiration: 18  Weight: 196 lb 6 08 oz    Physical Exam        Constitutional - General appearance: Normal       Musculoskeletal - Gait and station: Normal -- Digits and nails: Normal -- Inspection/palpation of joints, bones, and muscles: Abnormal -- Muscle strength/tone: Normal  Examination the patient's left knee  There is a large effusion  Well-healed anterior lateral and medial portals, full extension, flexion greater than 120Â° minimal pain on palpation medial lateral joint  Stable to varus stress  Negative anterior posterior drawer testing  Sensation intact  Distal pulses present      Cardiovascular - Pulses: Normal -- Examination of extremities for edema and/or varicosities: Normal       Skin - Skin and subcutaneous tissue: Normal       Neurologic - Sensation: Normal       Psychiatric - Orientation to person, place, and time: Normal -- Mood and affect: Normal       Eyes      Conjunctiva and lids: Normal        Pupils and irises: Normal        Procedure      Procedure: Aspiration Injection of the left knee joint  Indication:  Effusion,-- Joint pain-- and-- Osteoarthritis  Potential complications include bleeding,-- infection-- and-- allergic reaction  Risk, benefits and alternatives were discussed with the patient  Verbal consent was obtained prior to the procedure  Betadine was used to prep the area  ethyl chloride spray was used as a topical anesthetic  The area was then injected with 3 mL 1% lidocaine without epinephrine  Using sterile technique, the aspiration/injection needle was then directed from a Superolateral aspect  35 mL of yellow, clear fluid was aspirated with an 18-gauge  The syringe was changed and the same needle was left in place and was used to inject 1 5 mL of 1% Lidocaine,-- 1 5 mL of 0 25% Bupivacaine-- and-- 2 mL of 40mg/mL methylprednisolone  A bandage was applied  the patient tolerated the procedure well  Complications: none  Future Appointments      Date/Time Provider Specialty Site   04/26/2017 06:30 PM ARMOND Dacosta 476   02/07/2017 03:10 PM ARMOND Marcelino  Orthopedic Surgery [address]   02/01/2017 02:40 PM ARMOND Marrero   Orthopedic Surgery [facility] [place]     Signatures    Electronically signed by :  Demetris Tovar, [facility]; Nov 4 2016 12:15PM EST                       (Author)     Electronically signed by : ARMOND Kwan ; Jan 23 2018  3:06PM EST

## 2018-01-19 ENCOUNTER — ALLSCRIPTS OFFICE VISIT (OUTPATIENT)
Dept: OTHER | Facility: OTHER | Age: 53
End: 2018-01-19

## 2018-01-20 NOTE — PROGRESS NOTES
Assessment   1  Preop examination (V72 94) (F00 089)    Discussion/Summary   Surgical Clearance: He is at a LOW risk from a cardiovascular standpoint at this time without any additional cardiac testing  Reevaluation needed, if he should present with symptoms prior to surgery/procedure  Surgical clearance faxed to Dr Skye Bowling   The treatment plan was reviewed with the patient/guardian  The patient/guardian understands and agrees with the treatment plan      History of Present Illness   Pre-Op Visit (Brief): The patient is being seen for a preoperative visit  The procedure is a(n) Left 2nd trigger finger release scheduled for 2/6/18 with Skye Bowling  The indication for surgery is left trigger  Surgical Risk Assessment:      Prior Anesthesia: He had prior anesthesia-- and-- no prior adverse reaction to general anesthesia  Pertinent Past Medical History: no pertinent past medical history  Exercise Capacity: able to walk four blocks without symptoms-- and-- able to walk two flights of stairs without symptoms  Lifestyle Factors: denies alcohol use, denies tobacco use and denies illegal drug use  Symptoms: no symptoms,-- no easy bleeding,-- no chest pain,-- no cough,-- no dyspnea,-- no edema,-- no palpitations-- and-- no wheezing  Review of Systems        Constitutional: No fever or chills, feels well, no tiredness, no recent weight gain or weight loss  Eyes: No complaints of eye pain, no red eyes, no discharge from eyes, no itchy eyes  ENT: no complaints of earache, no hearing loss, no nosebleeds, no nasal discharge, no sore throat, no hoarseness  Cardiovascular: No complaints of slow heart rate, no fast heart rate, no chest pain, no palpitations, no leg claudication, no lower extremity  Respiratory: No complaints of shortness of breath, no wheezing, no cough, no SOB on exertion, no orthopnea or PND        Gastrointestinal: No complaints of abdominal pain, no constipation, no nausea or vomiting, no diarrhea or bloody stools  Genitourinary: No complaints of dysuria, no incontinence, no hesitancy, no nocturia, no genital lesion, no testicular pain  Musculoskeletal: arthralgias, but-- No complaints of arthralgia, no myalgias, no joint swelling or stiffness, no limb pain or swelling  Integumentary: No complaints of skin rash or skin lesions, no itching, no skin wound, no dry skin  Neurological: No compliants of headache, no confusion, no convulsions, no numbness or tingling, no dizziness or fainting, no limb weakness, no difficulty walking  Psychiatric: Is not suicidal, no sleep disturbances, no anxiety or depression, no change in personality, no emotional problems  Endocrine: No complaints of proptosis, no hot flashes, no muscle weakness, no erectile dysfunction, no deepening of the voice, no feelings of weakness  Active Problems   1  Aftercare following surgery (V58 89) (Z48 89)   2  Controlled diabetes mellitus (250 00) (E11 9)   3  Effusion of left knee (719 06) (M25 462)   4  Effusion of left prepatellar bursa (719 06) (M25 462)   5  Encounter for immunization (V03 89) (Z23)   6  Encounter for screening colonoscopy (V76 51) (Z12 11)   7  Flu vaccine need (V04 81) (Z23)   8  Hamstring strain, right, initial encounter   9  Hyperlipidemia (272 4) (E78 5)   10  Knee mass, right (719 66) (M25 861)   11  Localized primary osteoarthritis of lower leg, left (715 16) (M17 12)   12  Low back pain (724 2) (M54 5)   13  Lumbar radiculopathy (724 4) (M54 16)   14  Mouth lesion (528 9) (K13 70)   15  Need for Tdap vaccination (V06 1) (Z23)   16  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) (M19 049)   17  Obesity (278 00) (E66 9)   18  Pain in left knee (719 46) (M25 562)   19  Painful knee (719 46) (M25 569)   20  Posterior knee pain, right (719 46) (M25 561)   21  Seasonal allergies (477 9) (J30 2)   22  Severe sleep apnea (780 57) (G47 30)   23  Snoring (786 09) (R06 83)   24   Tear of lateral meniscus of knee, left, initial encounter   25  Tear of medial meniscus of left knee, subsequent encounter (V58 89,836 0) (S83 242D)   26  Trigger finger, unspecified finger, unspecified laterality (727 03) (M65 30)   27  Trigger index finger of left hand (727 03) (M65 322)   28  Trigger index finger of right hand (727 03) (M65 321)    Past Medical History    · History of influenza vaccination (V49 89) (Z92 29)     The active problems and past medical history were reviewed and updated today  Surgical History    · History of Femur Repair   · History of Hernia Repair   · History of Knee Arthroscopy With Medial Meniscus Repair   · History of Knee Surgery     The surgical history was reviewed and updated today  Family History   Mother    · Family history of diabetes mellitus (V18 0) (Z83 3)   · Family history of kidney disease (V18 69) (Z80 3)  Father    · Family history of diabetes mellitus (V18 0) (Z83 3)     The family history was reviewed and updated today  Social History    · Denied: History of Drug use   · Full-time employment   · [employer]   ·    · Never a smoker   · Non-smoker (V49 89) (Z78 9)   · Denied: History of Sexual history   · Social alcohol use (Z78 9)  The social history was reviewed and updated today  Current Meds    1  Allegra CAPS; Therapy: (Recorded:35Xdm4660) to Recorded   2  Atorvastatin Calcium 20 MG Oral Tablet; take one tablet at bedtime; Therapy: 86GLI7109 to (Evaluate:14Oct2017)  Requested for: 43Bsf4740; Last     Rx:35Fwy3089 Ordered   3  Cyclobenzaprine HCl - 5 MG Oral Tablet; Take one tablet three times daily as needed for     pain; Therapy: 20RUQ6641 to (Last Rx:17Aug2017)  Requested for: 17Aug2017 Ordered   4  Fluticasone Propionate 50 MCG/ACT Nasal Suspension; USE 2 SPRAYS IN EACH     NOSTRIL ONCE DAILY; Therapy: 94IAG0729 to (Last Rx:09Nov2017)  Requested for: 60YAK8985 Ordered   5   MetFORMIN HCl - 500 MG Oral Tablet; take 1 tablet once daily with a meal;     Therapy: 63Sjs1426 to (Evaluate:12Jan2018)  Requested for: 80Vpy5823; Last     Rx:57Jju2747 Ordered   6  Naproxen 500 MG Oral Tablet; TAKE 1 TABLET EVERY 12 HOURS WITH FOOD; Therapy: 97JDP2498 to ([id number])  Requested for: 53UND2401; Last     Rx:90Htd1068 Ordered     The medication list was reviewed and updated today  Allergies   1  No Known Drug Allergies    Vitals    Recorded: 20AHA1371 10:08AM   Temperature 98 4 F   Heart Rate 90   Respiration 20   Systolic 674   Diastolic 74   Height 5 ft 6 93 in   Weight 186 lb    BMI Calculated 29 19   BSA Calculated 1 96   O2 Saturation 98   Pain Scale 7     Physical Exam        Constitutional      General appearance: No acute distress, well appearing and well nourished  Head and Face      Head and face: Normal        Eyes      Conjunctiva and lids: No erythema, swelling or discharge  Pupils and irises: Equal, round, reactive to light  Ears, Nose, Mouth, and Throat      Otoscopic examination: Tympanic membranes translucent with normal light reflex  Canals patent without erythema  Nasal mucosa, septum, and turbinates: Normal without edema or erythema  Lips, teeth, and gums: Normal, good dentition  Oropharynx: Normal with no erythema, edema, exudate or lesions  Neck      Neck: Supple, symmetric, trachea midline, no masses  Thyroid: Normal, no thyromegaly  Pulmonary      Respiratory effort: No increased work of breathing or signs of respiratory distress  Auscultation of lungs: Clear to auscultation  Cardiovascular      Auscultation of heart: Normal rate and rhythm, normal S1 and S2, no murmurs  Peripheral vascular exam: Normal        Examination of extremities for edema and/or varicosities: Normal        Chest      Chest: Normal        Abdomen      Abdomen: Non-tender, no masses         Musculoskeletal      Gait and station: Normal        Neurologic Cranial nerves: Cranial nerves 2-12 intact  Psychiatric      Judgment and insight: Normal        Mood and affect: Normal        Results/Data   No acute ischemia  Rhythm and rate: normal sinus rhythm  P-waves: the P wave is normal       QRS: the QRS is normal       ST segment: the ST segments are normal       Comparison to prior ECGs:  no interval change  End of Encounter Meds   1  MetFORMIN HCl - 500 MG Oral Tablet; take 1 tablet once daily with a meal;     Therapy: 28Apr2016 to (Evaluate:12Jan2018)  Requested for: 58Zqu4272; Last     Rx:52Clu8180 Ordered  2  Atorvastatin Calcium 20 MG Oral Tablet; take one tablet at bedtime; Therapy: 14QMW6174 to (Evaluate:14Oct2017)  Requested for: 12Xyx0284; Last     Rx:14Dos6024 Ordered  3  Cyclobenzaprine HCl - 5 MG Oral Tablet; Take one tablet three times daily as needed for     pain; Therapy: 02OLP3568 to (Last Rx:17Aug2017)  Requested for: 56Ddy7975 Ordered   4  Naproxen 500 MG Oral Tablet; TAKE 1 TABLET EVERY 12 HOURS WITH FOOD; Therapy: 48HNG6436 to (21 )  Requested for: 21DQC9282; Last     Rx:30Fhh6176 Ordered  5  Fluticasone Propionate 50 MCG/ACT Nasal Suspension; USE 2 SPRAYS IN EACH     NOSTRIL ONCE DAILY; Therapy: 99ZZO2049 to (Last Rx:09Nov2017)  Requested for: 87AEM7440 Ordered  6  Allegra CAPS; Therapy: (Recorded:15Grm2784) to Recorded    Future Appointments      Date/Time Provider Specialty Site   05/09/2018 05:00 PM ARMOND Murray 6   02/14/2018 03:00 PM Gail Millan [facility] Orthopedic Surgery [place] [facility]   02/09/2018 10:20 AM ARMOND Scott  Orthopedic Surgery [facility]   02/06/2018 08:30 AM ARMOND Stevenson   [address] [place] [address]     Signatures    Electronically signed by : ARMOND Caro ; Jan 19 2018 10:35AM EST                       (Author)

## 2018-01-22 VITALS
WEIGHT: 189 LBS | BODY MASS INDEX: 29.66 KG/M2 | HEIGHT: 67 IN | OXYGEN SATURATION: 98 % | SYSTOLIC BLOOD PRESSURE: 132 MMHG | HEART RATE: 97 BPM | RESPIRATION RATE: 16 BRPM | DIASTOLIC BLOOD PRESSURE: 80 MMHG

## 2018-01-22 VITALS
BODY MASS INDEX: 30.19 KG/M2 | WEIGHT: 192.38 LBS | HEIGHT: 67 IN | SYSTOLIC BLOOD PRESSURE: 102 MMHG | HEART RATE: 86 BPM | DIASTOLIC BLOOD PRESSURE: 71 MMHG

## 2018-01-23 VITALS
SYSTOLIC BLOOD PRESSURE: 110 MMHG | BODY MASS INDEX: 29.19 KG/M2 | HEIGHT: 67 IN | OXYGEN SATURATION: 98 % | HEART RATE: 90 BPM | DIASTOLIC BLOOD PRESSURE: 74 MMHG | WEIGHT: 186 LBS | RESPIRATION RATE: 20 BRPM | TEMPERATURE: 98.4 F

## 2018-01-23 VITALS
WEIGHT: 189 LBS | SYSTOLIC BLOOD PRESSURE: 125 MMHG | DIASTOLIC BLOOD PRESSURE: 75 MMHG | HEIGHT: 67 IN | BODY MASS INDEX: 29.66 KG/M2 | HEART RATE: 93 BPM

## 2018-01-23 NOTE — MISCELLANEOUS
Message  Return to work or school:   Shayy Gipson is under my professional care  He was seen in my office on 12/29/2017       Patient underwent surgery on 12/21/2017 and due to lack of restricted duty will plan on being out of work for 3 months postoperatively  He will follow up in 6 weeks for re-evaluation  Tomasz Neal PA-C        Future Appointments    Signatures   Electronically signed by : Tomasz Neal [facility]; Dec 29 2017 11:57AM EST                       (Author)    Electronically signed by : Tomasz Neal [facility]; Dec 29 2017  1:08PM EST                       (Author)    Electronically signed by : Tomasz Neal [facility]; Dec 29 2017  1:08PM EST                       (Author)    Electronically signed by : ARMOND Stokes ; Dec 31 2017  2:14PM EST                       (Author)

## 2018-01-23 NOTE — CONSULTS
History of Present Illness  Pre-Op Visit (Brief): The patient is being seen for a preoperative visit  The procedure is a(n) Left 2nd trigger finger release scheduled for 2/6/18 with Freeman Carlson  The indication for surgery is left trigger  Surgical Risk Assessment:   Prior Anesthesia: He had prior anesthesia and no prior adverse reaction to general anesthesia  Pertinent Past Medical History: no pertinent past medical history  Exercise Capacity: able to walk four blocks without symptoms and able to walk two flights of stairs without symptoms  Lifestyle Factors: denies alcohol use, denies tobacco use and denies illegal drug use  Symptoms: no symptoms, no easy bleeding, no chest pain, no cough, no dyspnea, no edema, no palpitations and no wheezing  Review of Systems    Constitutional: No fever or chills, feels well, no tiredness, no recent weight gain or weight loss  Eyes: No complaints of eye pain, no red eyes, no discharge from eyes, no itchy eyes  ENT: no complaints of earache, no hearing loss, no nosebleeds, no nasal discharge, no sore throat, no hoarseness  Cardiovascular: No complaints of slow heart rate, no fast heart rate, no chest pain, no palpitations, no leg claudication, no lower extremity  Respiratory: No complaints of shortness of breath, no wheezing, no cough, no SOB on exertion, no orthopnea or PND  Gastrointestinal: No complaints of abdominal pain, no constipation, no nausea or vomiting, no diarrhea or bloody stools  Genitourinary: No complaints of dysuria, no incontinence, no hesitancy, no nocturia, no genital lesion, no testicular pain  Musculoskeletal: arthralgias, but No complaints of arthralgia, no myalgias, no joint swelling or stiffness, no limb pain or swelling  Integumentary: No complaints of skin rash or skin lesions, no itching, no skin wound, no dry skin     Neurological: No compliants of headache, no confusion, no convulsions, no numbness or tingling, no dizziness or fainting, no limb weakness, no difficulty walking  Psychiatric: Is not suicidal, no sleep disturbances, no anxiety or depression, no change in personality, no emotional problems  Endocrine: No complaints of proptosis, no hot flashes, no muscle weakness, no erectile dysfunction, no deepening of the voice, no feelings of weakness  Active Problems    1  Aftercare following surgery (V58 89) (Z48 89)   2  Controlled diabetes mellitus (250 00) (E11 9)   3  Effusion of left knee (719 06) (M25 462)   4  Effusion of left prepatellar bursa (719 06) (M25 462)   5  Encounter for immunization (V03 89) (Z23)   6  Encounter for screening colonoscopy (V76 51) (Z12 11)   7  Flu vaccine need (V04 81) (Z23)   8  Hamstring strain, right, initial encounter   9  Hyperlipidemia (272 4) (E78 5)   10  Knee mass, right (719 66) (M25 861)   11  Localized primary osteoarthritis of lower leg, left (715 16) (M17 12)   12  Low back pain (724 2) (M54 5)   13  Lumbar radiculopathy (724 4) (M54 16)   14  Mouth lesion (528 9) (K13 70)   15  Need for Tdap vaccination (V06 1) (Z23)   16  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) (M19 049)   17  Obesity (278 00) (E66 9)   18  Pain in left knee (719 46) (M25 562)   19  Painful knee (719 46) (M25 569)   20  Posterior knee pain, right (719 46) (M25 561)   21  Seasonal allergies (477 9) (J30 2)   22  Severe sleep apnea (780 57) (G47 30)   23  Snoring (786 09) (R06 83)   24  Tear of lateral meniscus of knee, left, initial encounter   25  Tear of medial meniscus of left knee, subsequent encounter (V58 89,836 0) (S83 242D)   26  Trigger finger, unspecified finger, unspecified laterality (727 03) (M65 30)   27  Trigger index finger of left hand (727 03) (M65 322)   28  Trigger index finger of right hand (727 03) (M65 321)    Past Medical History    · History of influenza vaccination (V49 89) (Z92 29)    The active problems and past medical history were reviewed and updated today        Surgical History · History of Femur Repair   · History of Hernia Repair   · History of Knee Arthroscopy With Medial Meniscus Repair   · History of Knee Surgery    The surgical history was reviewed and updated today  Family History    · Family history of diabetes mellitus (V18 0) (Z83 3)   · Family history of kidney disease (V18 69) (Z84 1)    · Family history of diabetes mellitus (V18 0) (Z83 3)    The family history was reviewed and updated today  Social History    · Denied: History of Drug use   · Full-time employment   · [employer]   ·    · Never a smoker   · Non-smoker (V49 89) (Z78 9)   · Denied: History of Sexual history   · Social alcohol use (Z78 9)  The social history was reviewed and updated today  Current Meds   1  Allegra CAPS; Therapy: (Recorded:53Lsh4834) to Recorded   2  Atorvastatin Calcium 20 MG Oral Tablet; take one tablet at bedtime; Therapy: 34THA4431 to (Evaluate:14Oct2017)  Requested for: 78Iij3641; Last   Rx:73Peh8548 Ordered   3  Cyclobenzaprine HCl - 5 MG Oral Tablet; Take one tablet three times daily as needed for   pain; Therapy: 08DDY4455 to (Last Rx:17Aug2017)  Requested for: 48Rpf3320 Ordered   4  Fluticasone Propionate 50 MCG/ACT Nasal Suspension; USE 2 SPRAYS IN EACH   NOSTRIL ONCE DAILY; Therapy: 18BYR1809 to (Last Rx:09Nov2017)  Requested for: 63KSS1595 Ordered   5  MetFORMIN HCl - 500 MG Oral Tablet; take 1 tablet once daily with a meal;   Therapy: 61Piq3285 to (Evaluate:12Jan2018)  Requested for: 99Pnw3219; Last   Rx:63Rpt6277 Ordered   6  Naproxen 500 MG Oral Tablet; TAKE 1 TABLET EVERY 12 HOURS WITH FOOD; Therapy: 11MEH5303 to [phone])  Requested for: 84CNY1925; Last   Rx:44Afu9776 Ordered    The medication list was reviewed and updated today  Allergies    1   No Known Drug Allergies    Vitals  Signs    Temperature: 98 4 F  Heart Rate: 90  Respiration: 20  Systolic: 243  Diastolic: 74  Height: 5 ft 6 93 in  Weight: 186 lb   BMI Calculated: 29 19  BSA Calculated: 1 96  O2 Saturation: 98  Pain Scale: 7    Physical Exam    Constitutional   General appearance: No acute distress, well appearing and well nourished  Head and Face   Head and face: Normal     Eyes   Conjunctiva and lids: No erythema, swelling or discharge  Pupils and irises: Equal, round, reactive to light  Ears, Nose, Mouth, and Throat   Otoscopic examination: Tympanic membranes translucent with normal light reflex  Canals patent without erythema  Nasal mucosa, septum, and turbinates: Normal without edema or erythema  Lips, teeth, and gums: Normal, good dentition  Oropharynx: Normal with no erythema, edema, exudate or lesions  Neck   Neck: Supple, symmetric, trachea midline, no masses  Thyroid: Normal, no thyromegaly  Pulmonary   Respiratory effort: No increased work of breathing or signs of respiratory distress  Auscultation of lungs: Clear to auscultation  Cardiovascular   Auscultation of heart: Normal rate and rhythm, normal S1 and S2, no murmurs  Peripheral vascular exam: Normal     Examination of extremities for edema and/or varicosities: Normal     Chest   Chest: Normal     Abdomen   Abdomen: Non-tender, no masses  Musculoskeletal   Gait and station: Normal     Neurologic   Cranial nerves: Cranial nerves 2-12 intact  Psychiatric   Judgment and insight: Normal     Mood and affect: Normal        Results/Data  No acute ischemia  Rhythm and rate: normal sinus rhythm  P-waves: the P wave is normal    QRS: the QRS is normal    ST segment: the ST segments are normal    Comparison to prior ECGs:  no interval change  Assessment    1  Preop examination (I35 14) (N28 200)    Discussion/Summary  Surgical Clearance: He is at a LOW risk from a cardiovascular standpoint at this time without any additional cardiac testing  Reevaluation needed, if he should present with symptoms prior to surgery/procedure  Surgical clearance faxed to Dr Ghazal Rider      The treatment plan was reviewed with the patient/guardian  The patient/guardian understands and agrees with the treatment plan      End of Encounter Meds    1  MetFORMIN HCl - 500 MG Oral Tablet; take 1 tablet once daily with a meal;   Therapy: 28Apr2016 to (Evaluate:12Jan2018)  Requested for: 63Wdi3086; Last   Rx:17Apr2017 Ordered    2  Atorvastatin Calcium 20 MG Oral Tablet; take one tablet at bedtime; Therapy: 93IQU1621 to (Evaluate:14Oct2017)  Requested for: 93Wrj3858; Last   Rx:17Apr2017 Ordered    3  Cyclobenzaprine HCl - 5 MG Oral Tablet; Take one tablet three times daily as needed for   pain; Therapy: 44RPQ7864 to (Last Rx:17Aug2017)  Requested for: 17Aug2017 Ordered   4  Naproxen 500 MG Oral Tablet; TAKE 1 TABLET EVERY 12 HOURS WITH FOOD; Therapy: 15NNO4010 to [phone])  Requested for: 85HZR4374; Last   Rx:79Yvl5089 Ordered    5  Fluticasone Propionate 50 MCG/ACT Nasal Suspension; USE 2 SPRAYS IN EACH   NOSTRIL ONCE DAILY; Therapy: 50LOB4393 to (Last Rx:09Nov2017)  Requested for: 83AQK3075 Ordered    6  Allegra CAPS;    Therapy: ([phone]) to Recorded    Signatures   Electronically signed by : ARMOND Perez ; Jan 19 2018 10:35AM EST                       (Author)

## 2018-01-25 ENCOUNTER — APPOINTMENT (OUTPATIENT)
Dept: PHYSICAL THERAPY | Facility: REHABILITATION | Age: 53
End: 2018-01-25
Payer: COMMERCIAL

## 2018-01-27 DIAGNOSIS — J30.2 SEASONAL ALLERGIC RHINITIS, UNSPECIFIED CHRONICITY, UNSPECIFIED TRIGGER: Primary | ICD-10-CM

## 2018-01-29 DIAGNOSIS — J30.2 SEASONAL ALLERGIC RHINITIS, UNSPECIFIED CHRONICITY, UNSPECIFIED TRIGGER: Primary | ICD-10-CM

## 2018-01-29 RX ORDER — LORATADINE 10 MG/1
TABLET ORAL
Qty: 30 TABLET | Refills: 3 | Status: SHIPPED | OUTPATIENT
Start: 2018-01-29 | End: 2018-01-30 | Stop reason: SDUPTHER

## 2018-01-29 RX ORDER — FLUTICASONE PROPIONATE 50 MCG
2 SPRAY, SUSPENSION (ML) NASAL DAILY
Qty: 16 G | Refills: 0 | Status: SHIPPED | OUTPATIENT
Start: 2018-01-29 | End: 2018-03-06 | Stop reason: SDUPTHER

## 2018-01-30 DIAGNOSIS — J30.2 SEASONAL ALLERGIC RHINITIS, UNSPECIFIED CHRONICITY, UNSPECIFIED TRIGGER: ICD-10-CM

## 2018-01-30 RX ORDER — LORATADINE 10 MG/1
TABLET ORAL
Qty: 30 TABLET | Refills: 3 | Status: SHIPPED | OUTPATIENT
Start: 2018-01-30 | End: 2018-02-12 | Stop reason: SDUPTHER

## 2018-02-01 ENCOUNTER — OFFICE VISIT (OUTPATIENT)
Dept: PHYSICAL THERAPY | Facility: REHABILITATION | Age: 53
End: 2018-02-01
Payer: COMMERCIAL

## 2018-02-01 DIAGNOSIS — S83.242D ACUTE MEDIAL MENISCUS TEAR OF LEFT KNEE, SUBSEQUENT ENCOUNTER: Primary | ICD-10-CM

## 2018-02-01 PROCEDURE — 97140 MANUAL THERAPY 1/> REGIONS: CPT | Performed by: PHYSICAL THERAPIST

## 2018-02-01 PROCEDURE — 97110 THERAPEUTIC EXERCISES: CPT | Performed by: PHYSICAL THERAPIST

## 2018-02-01 NOTE — PROGRESS NOTES
Daily Note     Today's date: 2018  Patient name: Rosa Pham  : 1965  MRN: [id number]  Referring provider: Fab Gee PA-C  Dx:   Encounter Diagnosis   Name Primary?  Acute medial meniscus tear of left knee, subsequent encounter Yes       Start Time: 1000  Stop Time: 1100  Total time in clinic (min): 60 minutes    Subjective: Patient reports that his right knee flexion is gradually improving, but it remains limited  Patient reports that he was able to shovel snow and perform all household activities with minimal difficulty  Patient is not having much pain  Objective: See treatment diary below      Assessment: Tolerated treatment well  Patient demonstrated fatigue post treatment, exhibited good technique with therapeutic exercises and would benefit from continued PT  Patient presents with improved right knee flexion ROM, but remains limited compared to contralateral side  Patient does continue to present with mild to moderate joint effusion  Plan: Continue per plan of care  and Progress treatment as tolerated  Precautions: DM    Daily Treatment Diary     Manual              Right knee flexion, extension PROM GR            Gastroc, hamstring, hip flexor str  GR            Gr  II-IV pat mobs  GR                                          Exercise Diary              Upright bike 10'            Vg, L7 5'            Slantboard calf str  Prone quad str   10x10"            LSD             SLS on Airex 5x30"            Supine SLR 2x10 2#            Hip abduction - s/l 2x10 2#            Prone hip extension 2x10 2#                                                                                                                                                               Modalities              CP 10'

## 2018-02-05 ENCOUNTER — ANESTHESIA EVENT (OUTPATIENT)
Dept: PERIOP | Facility: HOSPITAL | Age: 53
End: 2018-02-05
Payer: COMMERCIAL

## 2018-02-05 NOTE — ANESTHESIA PREPROCEDURE EVALUATION
Review of Systems/Medical History  Patient summary reviewed  Chart reviewed  No history of anesthetic complications     Cardiovascular  Hyperlipidemia,    Pulmonary    Comment: Seasonal allergies     GI/Hepatic  Negative GI/hepatic ROS          Negative  ROS        Endo/Other  Diabetes type 2 ,      GYN       Hematology  Negative hematology ROS      Musculoskeletal  Osteoarthritis,   Comment: Trigger finger-bilat right and left hand Arthritis     Neurology  Negative neurology ROS      Psychology   Anxiety, Depression ,              Physical Exam    Airway    Mallampati score: II  TM Distance: >3 FB       Dental   No notable dental hx     Cardiovascular  Rhythm: regular,     Pulmonary  Breath sounds clear to auscultation,     Other Findings        Anesthesia Plan  ASA Score- 2     Anesthesia Type- general with ASA Monitors  Additional Monitors:   Airway Plan: LMA  Comment: Took am Metoprolol  Plan Factors-    Induction- intravenous  Postoperative Plan- Plan for postoperative opioid use  Planned trial extubation    Informed Consent- Anesthetic plan and risks discussed with patient  I personally reviewed this patient with the CRNA  Discussed and agreed on the Anesthesia Plan with the CRNA  Chay Jacobs

## 2018-02-06 ENCOUNTER — HOSPITAL ENCOUNTER (OUTPATIENT)
Facility: HOSPITAL | Age: 53
Setting detail: OUTPATIENT SURGERY
Discharge: HOME | End: 2018-02-06
Attending: ORTHOPAEDIC SURGERY | Admitting: ORTHOPAEDIC SURGERY
Payer: COMMERCIAL

## 2018-02-06 ENCOUNTER — ANESTHESIA (OUTPATIENT)
Dept: PERIOP | Facility: HOSPITAL | Age: 53
End: 2018-02-06
Payer: COMMERCIAL

## 2018-02-06 VITALS
SYSTOLIC BLOOD PRESSURE: 109 MMHG | BODY MASS INDEX: 28.88 KG/M2 | TEMPERATURE: 98.3 F | RESPIRATION RATE: 17 BRPM | DIASTOLIC BLOOD PRESSURE: 62 MMHG | HEIGHT: 67 IN | OXYGEN SATURATION: 94 % | HEART RATE: 74 BPM | WEIGHT: 184 LBS

## 2018-02-06 DIAGNOSIS — M65.342 TRIGGER FINGER OF ALL DIGITS OF LEFT HAND: Primary | ICD-10-CM

## 2018-02-06 DIAGNOSIS — M65.322 TRIGGER FINGER OF ALL DIGITS OF LEFT HAND: Primary | ICD-10-CM

## 2018-02-06 DIAGNOSIS — M65.312 TRIGGER FINGER OF ALL DIGITS OF LEFT HAND: Primary | ICD-10-CM

## 2018-02-06 DIAGNOSIS — M65.352 TRIGGER FINGER OF ALL DIGITS OF LEFT HAND: Primary | ICD-10-CM

## 2018-02-06 DIAGNOSIS — M65.332 TRIGGER FINGER OF ALL DIGITS OF LEFT HAND: Primary | ICD-10-CM

## 2018-02-06 LAB
GLUCOSE SERPL-MCNC: 103 MG/DL (ref 65–140)
GLUCOSE SERPL-MCNC: 110 MG/DL (ref 65–140)

## 2018-02-06 PROCEDURE — 26055 INCISE FINGER TENDON SHEATH: CPT | Performed by: ORTHOPAEDIC SURGERY

## 2018-02-06 PROCEDURE — 82948 REAGENT STRIP/BLOOD GLUCOSE: CPT

## 2018-02-06 PROCEDURE — 20550 NJX 1 TENDON SHEATH/LIGAMENT: CPT | Performed by: ORTHOPAEDIC SURGERY

## 2018-02-06 RX ORDER — LIDOCAINE HYDROCHLORIDE 10 MG/ML
INJECTION, SOLUTION INFILTRATION; PERINEURAL AS NEEDED
Status: DISCONTINUED | OUTPATIENT
Start: 2018-02-06 | End: 2018-02-06 | Stop reason: SURG

## 2018-02-06 RX ORDER — CEFAZOLIN SODIUM 1 G/3ML
INJECTION, POWDER, FOR SOLUTION INTRAMUSCULAR; INTRAVENOUS AS NEEDED
Status: DISCONTINUED | OUTPATIENT
Start: 2018-02-06 | End: 2018-02-06 | Stop reason: SURG

## 2018-02-06 RX ORDER — HYDROCODONE BITARTRATE AND ACETAMINOPHEN 5; 325 MG/1; MG/1
1 TABLET ORAL EVERY 6 HOURS PRN
Status: DISCONTINUED | OUTPATIENT
Start: 2018-02-06 | End: 2018-02-06 | Stop reason: HOSPADM

## 2018-02-06 RX ORDER — SODIUM CHLORIDE, SODIUM LACTATE, POTASSIUM CHLORIDE, CALCIUM CHLORIDE 600; 310; 30; 20 MG/100ML; MG/100ML; MG/100ML; MG/100ML
125 INJECTION, SOLUTION INTRAVENOUS CONTINUOUS
Status: DISCONTINUED | OUTPATIENT
Start: 2018-02-06 | End: 2018-02-06 | Stop reason: HOSPADM

## 2018-02-06 RX ORDER — FENTANYL CITRATE 50 UG/ML
INJECTION, SOLUTION INTRAMUSCULAR; INTRAVENOUS AS NEEDED
Status: DISCONTINUED | OUTPATIENT
Start: 2018-02-06 | End: 2018-02-06 | Stop reason: SURG

## 2018-02-06 RX ORDER — METOCLOPRAMIDE HYDROCHLORIDE 5 MG/ML
10 INJECTION INTRAMUSCULAR; INTRAVENOUS ONCE AS NEEDED
Status: DISCONTINUED | OUTPATIENT
Start: 2018-02-06 | End: 2018-02-06 | Stop reason: HOSPADM

## 2018-02-06 RX ORDER — ONDANSETRON 2 MG/ML
4 INJECTION INTRAMUSCULAR; INTRAVENOUS ONCE AS NEEDED
Status: DISCONTINUED | OUTPATIENT
Start: 2018-02-06 | End: 2018-02-06 | Stop reason: HOSPADM

## 2018-02-06 RX ORDER — FENTANYL CITRATE/PF 50 MCG/ML
50 SYRINGE (ML) INJECTION
Status: DISCONTINUED | OUTPATIENT
Start: 2018-02-06 | End: 2018-02-06 | Stop reason: HOSPADM

## 2018-02-06 RX ORDER — HYDROCODONE BITARTRATE AND ACETAMINOPHEN 5; 325 MG/1; MG/1
1 TABLET ORAL EVERY 6 HOURS PRN
Qty: 10 TABLET | Refills: 0 | Status: SHIPPED | OUTPATIENT
Start: 2018-02-06 | End: 2018-02-16

## 2018-02-06 RX ORDER — ONDANSETRON 2 MG/ML
INJECTION INTRAMUSCULAR; INTRAVENOUS AS NEEDED
Status: DISCONTINUED | OUTPATIENT
Start: 2018-02-06 | End: 2018-02-06 | Stop reason: SURG

## 2018-02-06 RX ORDER — PROPOFOL 10 MG/ML
INJECTION, EMULSION INTRAVENOUS AS NEEDED
Status: DISCONTINUED | OUTPATIENT
Start: 2018-02-06 | End: 2018-02-06 | Stop reason: SURG

## 2018-02-06 RX ORDER — SODIUM CHLORIDE, SODIUM LACTATE, POTASSIUM CHLORIDE, CALCIUM CHLORIDE 600; 310; 30; 20 MG/100ML; MG/100ML; MG/100ML; MG/100ML
50 INJECTION, SOLUTION INTRAVENOUS CONTINUOUS
Status: DISCONTINUED | OUTPATIENT
Start: 2018-02-06 | End: 2018-02-06 | Stop reason: HOSPADM

## 2018-02-06 RX ORDER — MIDAZOLAM HYDROCHLORIDE 1 MG/ML
INJECTION INTRAMUSCULAR; INTRAVENOUS AS NEEDED
Status: DISCONTINUED | OUTPATIENT
Start: 2018-02-06 | End: 2018-02-06 | Stop reason: SURG

## 2018-02-06 RX ORDER — MEPERIDINE HYDROCHLORIDE 25 MG/ML
12.5 INJECTION INTRAMUSCULAR; INTRAVENOUS; SUBCUTANEOUS ONCE AS NEEDED
Status: DISCONTINUED | OUTPATIENT
Start: 2018-02-06 | End: 2018-02-06 | Stop reason: HOSPADM

## 2018-02-06 RX ORDER — DEXAMETHASONE SODIUM PHOSPHATE 10 MG/ML
INJECTION, SOLUTION INTRAMUSCULAR; INTRAVENOUS AS NEEDED
Status: DISCONTINUED | OUTPATIENT
Start: 2018-02-06 | End: 2018-02-06 | Stop reason: HOSPADM

## 2018-02-06 RX ORDER — GLYCOPYRROLATE 0.2 MG/ML
INJECTION INTRAMUSCULAR; INTRAVENOUS AS NEEDED
Status: DISCONTINUED | OUTPATIENT
Start: 2018-02-06 | End: 2018-02-06 | Stop reason: SURG

## 2018-02-06 RX ORDER — MAGNESIUM HYDROXIDE 1200 MG/15ML
LIQUID ORAL AS NEEDED
Status: DISCONTINUED | OUTPATIENT
Start: 2018-02-06 | End: 2018-02-06 | Stop reason: HOSPADM

## 2018-02-06 RX ADMIN — ONDANSETRON 4 MG: 2 INJECTION INTRAMUSCULAR; INTRAVENOUS at 11:11

## 2018-02-06 RX ADMIN — FENTANYL CITRATE 50 MCG: 50 INJECTION, SOLUTION INTRAMUSCULAR; INTRAVENOUS at 11:07

## 2018-02-06 RX ADMIN — PROPOFOL 150 MG: 10 INJECTION, EMULSION INTRAVENOUS at 11:07

## 2018-02-06 RX ADMIN — SODIUM CHLORIDE, SODIUM LACTATE, POTASSIUM CHLORIDE, AND CALCIUM CHLORIDE 125 ML/HR: .6; .31; .03; .02 INJECTION, SOLUTION INTRAVENOUS at 09:35

## 2018-02-06 RX ADMIN — DEXAMETHASONE SODIUM PHOSPHATE 5 MG: 10 INJECTION INTRAMUSCULAR; INTRAVENOUS at 11:11

## 2018-02-06 RX ADMIN — MIDAZOLAM HYDROCHLORIDE 2 MG: 1 INJECTION, SOLUTION INTRAMUSCULAR; INTRAVENOUS at 11:04

## 2018-02-06 RX ADMIN — LIDOCAINE HYDROCHLORIDE 50 MG: 10 INJECTION, SOLUTION INFILTRATION; PERINEURAL at 11:07

## 2018-02-06 RX ADMIN — GLYCOPYRROLATE 0.2 MG: 0.2 INJECTION, SOLUTION INTRAMUSCULAR; INTRAVENOUS at 11:11

## 2018-02-06 RX ADMIN — CEFAZOLIN 2000 MG: 1 INJECTION, POWDER, FOR SOLUTION INTRAVENOUS at 11:11

## 2018-02-06 NOTE — PERIOPERATIVE NURSING NOTE
VSS, pt denies pain or nausea, assessment unchanged, report called, no questions, pt transferred to [facility]

## 2018-02-06 NOTE — H&P
H&P Exam - Orthopedics   Saw Freeman Britton 45 y o  male MRN: [id number]  Unit/Bed#: [facility] 05    Assessment/Plan   Assessment:  Bilateral trigger fingers index, long, ring and small fingers  Plan:  Surgical release of left index trigger finger  Injections to left long ring and small fingers     History of Present Illness   HPI:  Dimple Vega is a 45 y o  y o  male who presents with Bilateral trigger fingers index, long, ring and small fingers  Has locking and catching with pain in these fingers  Denies n/t  Historical Information  Review Of Systems:   · Skin: Normal  · Neuro: See HPI  · Musculoskeletal: See HPI  · 14 point review of systems negative except as stated above     Past Medical History:   Past Medical History:   Diagnosis Date    Anxiety     Arthritis     Depression     Diabetes mellitus ([facility] [address] )     NIDDM    Hyperlipidemia     Lumbar disc disorder     left leg and foot  numbness    Meniscus tear     Other specified joint disorders, right knee     Seasonal allergies     Trigger finger, left index finger     and right       Past Surgical History:   Past Surgical History:   Procedure Laterality Date    ARTHROSCOPY KNEE Left 2/1/2016    Procedure: ARTHROSCOPY KNEE;  Surgeon: Summer Peralta MD;  [facility]: [facility];  Service:     ARTHROSCOPY KNEE Right 12/21/2017    Procedure: KNEE ARTHROSCOPY; DECOMPRESSION OF ACL CYST, PARTIAL MEDIAL MENISECTOMY;  Surgeon: Junior Singh MD;  Location: [facility];  Service: Orthopedics    FRACTURE SURGERY Right     leg    HERNIA REPAIR      umbilical     TX COLONOSCOPY FLX DX W/COLLJ SPEC WHEN PFRMD N/A 8/18/2016    Procedure: COLONOSCOPY;  Surgeon: Billy Rodriguez MD;  Location: [facility];   Service: Gastroenterology    TX KNEE SCOPE,MED/LAT MENISECTOMY Left 2/1/2016    Procedure: PARTIAL MENISCECTOMY MEDIAL, SYNOCECTOMY, CHONDROPLASTY;  Surgeon: Summer Peralta MD;  Location: [facility];  Service: Orthopedics       Family History:  Family history reviewed and non-contributory  Family History   Problem Relation Age of Onset    Diabetes Mother     Diabetes Father        Social History:  Social History     Social History    Marital status: /Civil Union     Spouse name: N/A    Number of children: N/A    Years of education: N/A     Social History Main Topics    Smoking status: Never Smoker    Smokeless tobacco: Never Used    Alcohol use Yes      Comment: few x year    Drug use: No    Sexual activity: Not Asked     Other Topics Concern    None     Social History Narrative    None       Allergies: Allergies   Allergen Reactions    Other Other (See Comments)     Seasonal - nasal congestion           Labs:    0  Lab Value Date/Time   HCT 38 1 11/13/2015 0851   HCT 40 8 08/03/2015 0731   HGB 13 2 11/13/2015 0851   HGB 14 2 08/03/2015 0731   WBC 5 11 11/13/2015 0851   WBC 5 65 08/03/2015 0731       Meds:    Current Facility-Administered Medications:     lactated ringers infusion, 125 mL/hr, Intravenous, Continuous, Jonathan Angeles MD, Last Rate: 125 mL/hr at 02/06/18 0935, 125 mL/hr at 02/06/18 0935    lidocaine-epinephrine (XYLOCAINE/EPINEPHRINE) 1 %-1:100,000 20 mL, sodium bicarbonate 2 mEq infiltration, , Infiltration, Once, Jearldine Goodell, MD    Blood Culture:   No results found for: BLOODCX    Wound Culture:   No results found for: WOUNDCULT    Ins and Outs:  No intake/output data recorded  Physical Exam  /68   Pulse 76   Temp 98 7 °F (37 1 °C) (Tympanic Core)   Resp 16   Ht 5' 7" (1 702 m)   Wt 83 5 kg (184 lb)   SpO2 99%   BMI 28 82 kg/m²   Gen: Alert and oriented to person, place, time  HEENT: EOMI, eyes clear, moist mucus membranes, hearing intact  Respiratory: Bilateral chest rise  No audible wheezing found  Cardiovascular: Regular Rate and Rhythm  Abdomen: soft nontender/nondistended  Ortho Exam: Triggering of left index, ring long and small fingers  Palpable nodules    Neuro/Vasc Exam: Sensory intact in all fingers left hand  Fingers warm and well perfused       Lab Results: Reviewed  Imaging: Reviewed

## 2018-02-06 NOTE — OP NOTE
OPERATIVE REPORT  PATIENT NAME: Cristian Irwin  :  1965  MRN: [id number]  Pt Location: [facility]    SURGERY DATE: 18    Surgeon(s) and Role:     * Gini Green MD - Primary     * Aleksandra Maldonado PA-C - Assisting     * Stephanie Liang PA-C - Assisting    Pre-Op Diagnosis:  Trigger finger [M65 30]    Post-Op Diagnosis Codes:     * Trigger finger [M65 30]    Procedure(s):  INDEX FINGER TRIGGER RELEASE; LEFT LONG, RING, and SMALL FINGER INJECTIONS (Left)    Specimen(s):  * No orders in the log *    Estimated Blood Loss:   Minimal      Anesthesia Type:   General    Operative Indications: The patient has a history of Trigger Finger  left  index finger, long finger, ring finger, small finger that was recalcitrant to conservative management  The decision was made to bring the patient to the operating room for Trigger Finger Release  left  index finger, and injection into the left long, ring, and small finger trigger fingers  Risks of the procedure were explained which include, but are not limited to bleeding; infection; damage to nerves, arteries,veins, tendons; scar; pain; need for reoperation; failure to give desired result; and risks of anaesthesia  All questions were answered to satisfaction and they were willing to proceed  Operative Findings:  Trigger fingers left index, long, ring, and small finger    Complications:   None    Procedure and Technique:  After the patient, site, and procedure were identified, the patient was brought into the operating room in a supine position  General anaesthesia and local medication were provided  A well padded tourniquet was applied to the extremity, set at 250 mmHg  The  left upper extremity was then prepped and drapped in a normal, sterile, orthopedic fashion  After the patient, site, and procedure were once again identified, attention was turned to the left index finger    An incision was made over the flexor tendon sheath at the level of the A1 pulley  Dissection was carried out in-line with the flexor tendon sheath and the radial and ulnar digital artery and nerve were protected  The A1 pulley was identified at the base of the incision  Under direct visualization, the A1 pulley was divided along the midline in its entirety with care taken to avoid injury to the underlying tendon  The tendons were examined to ensure that no further catching, popping, clicking or locking occurred with motion of the finger  After the patient, site, and procedure were once again identified the long, ring, and small finger were injected with a combination of 1 cc of 1% lidocaine with epinephrine and 5 mg of dexamethasone  This was injected at the level of the A1 pulley for trigger finger injections  At the completion of the procedure, hemostasis was obtained with cautery and direct pressure  The wounds were copiously irrigated with sterile solution  The wounds were closed with Monocryl, Histocryl and Steri-strips  Sterile dressings were applied, including Gauze, Tweeners, Webril, Ace  Please note, all sponge, needle, and instrument counts were correct prior to closure  Loupe magnification was utilized  The patient tolerated the procedure well       I was present for the entire procedure and A qualified resident physician was not available    Patient Disposition:  PACU  and hemodynamically stable    SIGNATURE: Tanika Rodriguez MD  DATE: 02/06/18  TIME: 11:32 AM

## 2018-02-06 NOTE — ANESTHESIA POSTPROCEDURE EVALUATION
Post-Op Assessment Note      CV Status:  Stable    Mental Status:  Lethargic    Hydration Status:  Euvolemic    PONV Controlled:  Controlled    Airway Patency:  Patent    Post Op Vitals Reviewed: Yes          Staff: Anesthesiologist, CRNA           BP   117/63   Temp   98 1   Pulse  85   Resp   12   SpO2   100

## 2018-02-08 ENCOUNTER — APPOINTMENT (OUTPATIENT)
Dept: PHYSICAL THERAPY | Facility: REHABILITATION | Age: 53
End: 2018-02-08
Payer: COMMERCIAL

## 2018-02-09 ENCOUNTER — OFFICE VISIT (OUTPATIENT)
Dept: PHYSICAL THERAPY | Facility: REHABILITATION | Age: 53
End: 2018-02-09
Payer: COMMERCIAL

## 2018-02-09 DIAGNOSIS — S83.242D ACUTE MEDIAL MENISCUS TEAR OF LEFT KNEE, SUBSEQUENT ENCOUNTER: Primary | ICD-10-CM

## 2018-02-09 PROCEDURE — 97140 MANUAL THERAPY 1/> REGIONS: CPT | Performed by: PHYSICAL THERAPIST

## 2018-02-09 PROCEDURE — 97110 THERAPEUTIC EXERCISES: CPT | Performed by: PHYSICAL THERAPIST

## 2018-02-09 NOTE — PROGRESS NOTES
Daily Note     Today's date: 2018  Patient name: Blessign Doherty  : 1965  MRN: [id number]  Referring provider: Jerzy Cervantes PA-C  Dx:   Encounter Diagnosis   Name Primary?  Acute medial meniscus tear of left knee, subsequent encounter Yes       Start Time: 1000  Stop Time: 1110  Total time in clinic (min): 70 minutes    Subjective: Patient reports that his right knee ROM and stiffness is improving  Patient states that his knee is gradually beginning to feel better  Objective: See treatment diary below      Assessment: Tolerated treatment well  Patient demonstrated fatigue post treatment, exhibited good technique with therapeutic exercises and would benefit from continued PT  Patient with significantly improved right knee flexion ROM  Patient with fair eccentric control descending 8" step  Plan: Continue per plan of care  and Progress treatment as tolerated  Precautions: DM    Daily Treatment Diary     Manual             Right knee flexion, extension PROM GR GR           Gastroc, hamstring, hip flexor str  GR GR           Gr  II-IV pat mobs  GR GR                                         Exercise Diary              Upright bike 10' 10'           Vg, L7 5' 5'           Slantboard calf str  5x30"           Prone quad str   10x10" 10x10"           LSD  2x10 8"           SLS on Airex 5x30" 5x30"           Supine SLR 2x10 2#            Hip abduction - s/l 2x10 2#            Prone hip extension 2x10 2#                                                                                                                                                               Modalities              CP 10' 10'

## 2018-02-12 DIAGNOSIS — J30.2 SEASONAL ALLERGIC RHINITIS, UNSPECIFIED CHRONICITY, UNSPECIFIED TRIGGER: ICD-10-CM

## 2018-02-12 RX ORDER — LORATADINE 10 MG/1
TABLET ORAL
Qty: 30 TABLET | Refills: 3 | Status: SHIPPED | OUTPATIENT
Start: 2018-02-12 | End: 2018-06-20 | Stop reason: SDUPTHER

## 2018-02-13 ENCOUNTER — OFFICE VISIT (OUTPATIENT)
Dept: OBGYN CLINIC | Facility: MEDICAL CENTER | Age: 53
End: 2018-02-13
Payer: COMMERCIAL

## 2018-02-13 VITALS
DIASTOLIC BLOOD PRESSURE: 72 MMHG | SYSTOLIC BLOOD PRESSURE: 106 MMHG | BODY MASS INDEX: 29.03 KG/M2 | HEART RATE: 86 BPM | HEIGHT: 67 IN | WEIGHT: 185 LBS

## 2018-02-13 DIAGNOSIS — S83.242D OTHER TEAR OF MEDIAL MENISCUS OF LEFT KNEE AS CURRENT INJURY, SUBSEQUENT ENCOUNTER: ICD-10-CM

## 2018-02-13 DIAGNOSIS — M25.461 EFFUSION OF RIGHT KNEE: Primary | ICD-10-CM

## 2018-02-13 DIAGNOSIS — R22.41 KNEE MASS, RIGHT: ICD-10-CM

## 2018-02-13 PROCEDURE — 20610 DRAIN/INJ JOINT/BURSA W/O US: CPT | Performed by: ORTHOPAEDIC SURGERY

## 2018-02-13 PROCEDURE — 99213 OFFICE O/P EST LOW 20 MIN: CPT | Performed by: ORTHOPAEDIC SURGERY

## 2018-02-13 RX ORDER — LIDOCAINE HYDROCHLORIDE 10 MG/ML
4 INJECTION, SOLUTION INFILTRATION; PERINEURAL
Status: COMPLETED | OUTPATIENT
Start: 2018-02-13 | End: 2018-02-13

## 2018-02-13 RX ADMIN — LIDOCAINE HYDROCHLORIDE 4 ML: 10 INJECTION, SOLUTION INFILTRATION; PERINEURAL at 12:24

## 2018-02-13 NOTE — PROGRESS NOTES
Orthopaedic Surgery - Office Note  Saw Esteves Cousin (45 y o  male)   : 1965   MRN: [id number]  Encounter Date: 2018    Chief Complaint   Patient presents with    Right Knee - Follow-up       Assessment / Plan  8 weeks s/p right knee arthroscopy with partial medial meniscectomy and decompression of cruciate cyst on 17    · Aspiration of right knee joint was performed   · Patient was informed to continue therapy for strengthening his knee and hip  · Patient was also advised to wear the ACE wrap prevent a recurrent effusion   Return in about 6 weeks (around 3/27/2018) for Recheck  History of Present Illness  Marcelo Fe Freeman Adame is a 45 y o  male who presents 8 weeks s/p right knee arthroscopy with partial medial meniscectomy and decompression of cruciate cyst on 17  Pt states that he is overall feeling great and is having no issues  Pt states that therapy is going well and he notices improvements  Pt is going to formal physical therapy 1x per week and is doing his exercises at home daily as well as using his elliptical machine  Pt states that his pain is a 8/10 when he forcefully flexes knee and c/o minimal swelling today  Pt denies numbness and tingling and states that he is now able to ambulate upstairs without difficulty and no longer has calf pain since surgery  Review of Systems  A comprehensive review of systems was negative  Physical Exam  /72   Pulse 86   Ht 5' 7" (1 702 m)   Wt 83 9 kg (185 lb)   BMI 28 98 kg/m²   Cons: Appears well  No apparent distress  Psych: Alert  Oriented x3  Mood and affect normal   Eyes: PERRLA, EOMI  Resp: Normal effort  No audible wheezing or stridor  CV: Palpable pulse  No discernable arrhythmia  No LE edema  Lymph:  No palpable cervical, axillary, or inguinal lymphadenopathy  Skin: Warm  No palpable masses  No visible lesions  Neuro: Normal muscle tone  Normal and symmetric DTR's       Right Knee Exam  Alignment: Normal knee alignment  Inspection:  mild anterior knee swelling  No edema  No erythema  No ecchymosis  No muscle atrophy  No deformity  Palpation:  No tenderness  large knee joint effusion  No warmth  No crepitus  ROM:  Knee Extension 0  Knee Flexion 120  Strength:  5/5 quadriceps and hamstrings  Stability:  No objective knee instability  Stable Varus / Valgus stress, Lachman, and Posterior drawer  Tests:  No pertinent positive or negative tests  Patella:  Patella tracks centrally without crepitus  Neurovascular:  Sensation intact in DP/SP/Cannon/Sa/T nerve distributions  2+ DP & PT pulses  Diminished DP & PT pulses  Gait:  Normal     Studies Reviewed  No studies to review    Large joint arthrocentesis  Date/Time: 2/13/2018 12:24 PM  Consent given by: patient  Site marked: site marked  Supporting Documentation  Indications: joint swelling   Procedure Details  Location: knee -   Ultrasound guidance: no  Medications administered: 4 mL lidocaine 1 %    Aspirate amount: 60 mL  Aspirate: blood-tinged  Patient tolerance: patient tolerated the procedure well with no immediate complications  Dressing:  Sterile dressing applied           Medical, Surgical, Family, and Social History  The patient's medical history, family history, and social history, were reviewed and updated as appropriate      Past Medical History:   Diagnosis Date    Anxiety     Arthritis     Depression     Diabetes mellitus (HCC)     NIDDM    Hyperlipidemia     Lumbar disc disorder     left leg and foot  numbness    Meniscus tear     Other specified joint disorders, right knee     Seasonal allergies     Trigger finger, left index finger     and right       Past Surgical History:   Procedure Laterality Date    ARTHROSCOPY KNEE Left 2/1/2016    Procedure: ARTHROSCOPY KNEE;  Surgeon: Lucio Bruner MD;  [facility];  Service:     ARTHROSCOPY KNEE Right 12/21/2017    Procedure: KNEE ARTHROSCOPY; DECOMPRESSION OF ACL CYST, PARTIAL MEDIAL MENISECTOMY;  Surgeon: Issac Dennis MD;  [facility];  Service: Orthopedics    FRACTURE SURGERY Right     leg    HERNIA REPAIR      umbilical     SC COLONOSCOPY FLX DX W/COLLJ SPEC WHEN PFRMD N/A 8/18/2016    Procedure: COLONOSCOPY;  Surgeon: Rolo Guardado MD;  [facility]; Service: Gastroenterology    SC INCISE FINGER TENDON SHEATH Left 2/6/2018    Procedure: INDEX FINGER TRIGGER RELEASE; LEFT LONG, RING, and SMALL FINGER INJECTIONS;  Surgeon: Lexx Wells MD;  [facility];  Service: Orthopedics    SC KNEE SCOPE,MED/LAT MENISECTOMY Left 2/1/2016    Procedure: PARTIAL MENISCECTOMY MEDIAL, SYNOCECTOMY, CHONDROPLASTY;  Surgeon: Aileen Carmona MD;  [facility];  Service: Orthopedics       Family History   Problem Relation Age of Onset    Diabetes Mother     Diabetes Father        Social History     Occupational History    Not on file  Social History Main Topics    Smoking status: Never Smoker    Smokeless tobacco: Never Used    Alcohol use Yes      Comment: few x year    Drug use: No    Sexual activity: Not on file       Allergies   Allergen Reactions    Other Other (See Comments)     Seasonal - nasal congestion         Current Outpatient Prescriptions:     fluticasone (FLONASE) 50 mcg/act nasal spray, 2 sprays into each nostril daily, Disp: 16 g, Rfl: 0    HYDROcodone-acetaminophen (NORCO) 5-325 mg per tablet, Take 1 tablet by mouth every 6 (six) hours as needed for pain for up to 10 days Max Daily Amount: 4 tablets, Disp: 10 tablet, Rfl: 0    loratadine (CLARITIN) 10 mg tablet, TAKE 1 TABLET DAILY  , Disp: 30 tablet, Rfl: 3    metFORMIN (GLUCOPHAGE) 500 mg tablet, Take 500 mg by mouth daily with breakfast  , Disp: , Rfl:       Jovita Hurley Scribe    Scribe Attestation    I,:   Belen Moseleyibe am acting as a scribe while in the presence of the attending physician :        I,:   Issac Dennis MD personally performed the services described in this documentation    as scribed in my presence :

## 2018-02-14 ENCOUNTER — OFFICE VISIT (OUTPATIENT)
Dept: OBGYN CLINIC | Facility: HOSPITAL | Age: 53
End: 2018-02-14

## 2018-02-14 VITALS
SYSTOLIC BLOOD PRESSURE: 121 MMHG | BODY MASS INDEX: 30.76 KG/M2 | HEIGHT: 66 IN | WEIGHT: 191.4 LBS | HEART RATE: 90 BPM | DIASTOLIC BLOOD PRESSURE: 81 MMHG

## 2018-02-14 DIAGNOSIS — M65.322 TRIGGER FINGER OF ALL DIGITS OF LEFT HAND: Primary | ICD-10-CM

## 2018-02-14 DIAGNOSIS — M65.352 TRIGGER FINGER OF ALL DIGITS OF LEFT HAND: Primary | ICD-10-CM

## 2018-02-14 DIAGNOSIS — M65.332 TRIGGER FINGER OF ALL DIGITS OF LEFT HAND: Primary | ICD-10-CM

## 2018-02-14 DIAGNOSIS — M65.342 TRIGGER FINGER OF ALL DIGITS OF LEFT HAND: Primary | ICD-10-CM

## 2018-02-14 DIAGNOSIS — M65.312 TRIGGER FINGER OF ALL DIGITS OF LEFT HAND: Primary | ICD-10-CM

## 2018-02-14 PROCEDURE — 99024 POSTOP FOLLOW-UP VISIT: CPT | Performed by: PHYSICIAN ASSISTANT

## 2018-02-14 NOTE — PROGRESS NOTES
Diagnoses and all orders for this visit:    Trigger finger of all digits of left hand        Assessment:   Trigger Finger  left  index finger s/p release, trigger finger left long, ring and small finger injections    Plan:   Resume activities as tolerated, patient met with hand therapist today to learn HEP    Follow Up:  PRN    To Do Next Visit:         CHIEF COMPLAINT:  Chief Complaint   Patient presents with    Left Index Finger - Post-op         SUBJECTIVE:  Russell Cadet is a 45y o  year old male who presents for follow up after Trigger Finger Release  left  index finger as well as injections to the long, ring and small fingers  Today patient has Stiffness/LROM and discomfort  Clicking much reduced for injected fingers, but still slightly present primarily in the ring finger         PHYSICAL EXAMINATION:  General: well developed and well nourished, alert, oriented times 3 and appears comfortable  Psychiatric: Normal    MUSCULOSKELETAL EXAMINATION:  Incision: Clean, dry, intact  Range of Motion: As expected  Neurovascular status: Neuro intact, good cap refill  Activity Restrictions: No restrictions         STUDIES REVIEWED:  No Studies to review      PROCEDURES PERFORMED:  Procedures  No Procedures performed today

## 2018-03-01 ENCOUNTER — OFFICE VISIT (OUTPATIENT)
Dept: PHYSICAL THERAPY | Facility: REHABILITATION | Age: 53
End: 2018-03-01
Payer: COMMERCIAL

## 2018-03-01 DIAGNOSIS — S83.242D ACUTE MEDIAL MENISCUS TEAR OF LEFT KNEE, SUBSEQUENT ENCOUNTER: Primary | ICD-10-CM

## 2018-03-01 PROCEDURE — G8980 MOBILITY D/C STATUS: HCPCS | Performed by: PHYSICAL THERAPIST

## 2018-03-01 PROCEDURE — 97140 MANUAL THERAPY 1/> REGIONS: CPT | Performed by: PHYSICAL THERAPIST

## 2018-03-01 PROCEDURE — G8979 MOBILITY GOAL STATUS: HCPCS | Performed by: PHYSICAL THERAPIST

## 2018-03-01 PROCEDURE — 97110 THERAPEUTIC EXERCISES: CPT | Performed by: PHYSICAL THERAPIST

## 2018-03-01 NOTE — PROGRESS NOTES
PT Discharge    Today's date: 3/1/2018  Patient name: Fifi Day  : 1965  MRN: [id number]  Referring provider: Arlene Dove PA-C  Dx:   Encounter Diagnosis     ICD-10-CM    1  Acute medial meniscus tear of left knee, subsequent encounter S83 [id number]        Start Time: 430  Stop Time: 1525  Total time in clinic (min): 80 minutes    Assessment    Assessment details: Becky Sullivan is 10 weeks s/p right knee arthroscopy with partial medial menisectomy and decompression of cruciate cyst  Patient has attended a total number of 5 visits and has maintained good compliance with established POC  Patient has made significant improvements in all areas, including decreased pain, increased strength, increased ROM, improved flexibility, improved joint mobility, decreased joint effusion, improved balance and improved overall level of function  Patient is reporting improved ability to perform a/iadls and engaging in social activities  Patient is independent with comprehensive HEP  Patient has been instructed to contact PT if he begins to notice a decline in function or has any questions or concerns  Patient will be discharged at this time  Patient is in agreement with plan  Understanding of Dx/Px/POC: excellent  Goals  Short Term goals: to be achieved in 4 weeks ALL GOALS MET  1) Patient to be independent with basic Home Exercise Program   2) Decrease pain by 2 points on VAS  3) Increase lower extremity strength by 1/2 MMT grade in all deficient planes  4) Increase lower extremity Range of Motion by > 5 deg in all deficient planes  5) Patient to negotiate steps with a step-to pattern with use of HR  6) Increase ambulatory tolerance to 30 minutes with LRAD  Long Term Goals: to be achieved by discharge  1) FOTO equal to or greater than 57   2) Patient to be independent with comprehensive Home Exercise Program   3) Abolish pain for improved quality of life    4) Increase lower extremity strength to 5/5 MMT grade in all planes to improve a/instrumental activities of daily living  5) Increase lower extremity Range of Motion to within 5 deg of contralateral to improve a/instrumental activities of daily living  6) Patient to negotiate steps with a reciprocal pattern without use of HR  7) Patient to report no sleep interruption secondary to pain  8) Increase ambulatory tolerance to 60 minutes to improve ability to participate in social activities  Plan  Planned therapy interventions: home exercise program        Subjective Evaluation    History of Present Illness  Mechanism of injury: Patient reports that he has significantly less right knee pain and improved knee flexion ROM  Patient is able to walk greater than a mile and complete all a/iadls with greater ease  Patient does continue to have some difficulty/discomfort squatting  Patient estimates his right knee overall level of function to be approximately 80% premorbid norm  Patient anticipates returning to work by the end of the month    Pain  Current pain ratin  At best pain ratin  At worst pain ratin  Quality: tight    Treatments  Previous treatment: physical therapy  Current treatment: physical therapy  Patient Goals  Patient goals for therapy: decreased pain, increased motion, return to sport/leisure activities, return to work, independence with ADLs/IADLs and increased strength          Objective     Active Range of Motion     Right Knee   Flexion: 127 degrees   Extension: 0 degrees     Passive Range of Motion     Right Knee   Flexion: 135 degrees   Extension: 0 degrees     Mobility   Patellar Mobility:     Right Knee   WFL: medial, lateral, superior and inferior    Strength/Myotome Testing     Left Hip   Normal muscle strength    Right Hip   Normal muscle strength    Left Knee   Normal strength    Right Knee   Normal strength    Left Ankle/Foot   Normal strength    Right Ankle/Foot   Normal strength    Ambulation     Ambulation: Level Surfaces Ambulation without assistive device: independent    Observational Gait   Gait: within functional limits     Functional Assessment   Squat   Left within functional limits and right within functional limits  Single Leg Stance - Eyes Open   Left  Trial 1: 30 seconds    Right  Trial 1: 30 seconds    General Comments     Knee Comments  Knee Circumference Measurements (initial evaluation):    Mid patella: R = 41 2 ; L = 39 8    10 cm proximal to mid patella: R = 46 3 ; L = 43 5    10 cm distal to mid patella: R = 37 3 ; L = 37 0    Knee Circumference Measurements (reevaluation):     Mid patella: R = 40 4    10 cm proximal to mid patella: R = 44 5    10 cm distal to mid patella: R = 37 2      Flowsheet Rows    Flowsheet Row Most Recent Value   PT/OT G-Codes   Current Score  57   Projected Score  57   FOTO information reviewed  Yes   Assessment Type  Discharge   G code set  Mobility: Walking & Moving Around   Mobility: Walking and Moving Around Goal Status ()  CI   Mobility: Walking and Moving Around Discharge Status ()  CI          Precautions: DM    Daily Treatment Diary     Manual  2/1 2/9 3/1          Right knee flexion, extension PROM GR GR GR          Gastroc, hamstring, hip flexor str  GR GR           Gr  II-IV pat mobs  GR GR                                         Exercise Diary              Upright bike 10' 10' 10'          Vg, L7 5' 5' 5'          Slantboard calf str  5x30"           Prone quad str   10x10" 10x10"           LSD  2x10 8" 3x10 6"          SLS on Airex 5x30" 5x30" 5x30"          Supine SLR 2x10 2#            Hip abduction - s/l 2x10 2#            Prone hip extension 2x10 2#                                                                                                                                                               Modalities              CP 10' 10'

## 2018-03-05 ENCOUNTER — TELEPHONE (OUTPATIENT)
Dept: OBGYN CLINIC | Facility: HOSPITAL | Age: 53
End: 2018-03-05

## 2018-03-05 ENCOUNTER — OFFICE VISIT (OUTPATIENT)
Dept: OBGYN CLINIC | Facility: CLINIC | Age: 53
End: 2018-03-05

## 2018-03-05 VITALS
DIASTOLIC BLOOD PRESSURE: 74 MMHG | WEIGHT: 188 LBS | HEIGHT: 66 IN | BODY MASS INDEX: 30.22 KG/M2 | SYSTOLIC BLOOD PRESSURE: 118 MMHG | HEART RATE: 94 BPM

## 2018-03-05 DIAGNOSIS — T81.89XA SUTURE REACTION, INITIAL ENCOUNTER: Primary | ICD-10-CM

## 2018-03-05 PROCEDURE — 99024 POSTOP FOLLOW-UP VISIT: CPT | Performed by: ORTHOPAEDIC SURGERY

## 2018-03-05 NOTE — PROGRESS NOTES
Diagnoses and all orders for this visit:    Suture reaction, initial encounter        Assessment:   S/p trigger Finger  left  index finger  Suture abscess reaction  Monocryl allergy     Plan:   Patient was instructed to let his suture reaction alone and if the stitch comes through the skin he is allowed to pull it out with a pair of tweezers  He was told that his body will dissolve the sutures on its own and that medication was not advised for this issue  Follow Up:  PRN    To Do Next Visit:         CHIEF COMPLAINT:  Chief Complaint   Patient presents with    Left Hand - Post-op         SUBJECTIVE:  Joshua Solomon is a 45y o  year old male who presents for follow up after Trigger Finger Release  left  index finger on 2/6/18  Today patient has discomfort and yellowish discoloration to his incision site  Pt denies any discharge or redness to the area currently  Pt states that he is no longer experiencing any locking, clicking or sticking of his L index finger  Pt denies fevers and chills          PHYSICAL EXAMINATION:  General: well developed and well nourished, alert, oriented times 3 and appears comfortable  Psychiatric: Normal    MUSCULOSKELETAL EXAMINATION:  Incision: yellowish dislocation to incision site  Range of Motion: opposition intact and full composite fist possible  Neurovascular status: Neuro intact, good cap refill  Activity Restrictions: No restrictions         STUDIES REVIEWED:  No Studies to review      PROCEDURES PERFORMED:  Procedures  No Procedures performed today

## 2018-03-05 NOTE — TELEPHONE ENCOUNTER
I discussed this with our nurse Blaine Berry  She advised that the patient either be seen today in [facility] or go to the ed  Patient was willing to drive to [facility] & is on the schedule this afternoon

## 2018-03-05 NOTE — TELEPHONE ENCOUNTER
Patient is calling   [phone]  Patient sees Dr Artemio Cerna    Patient is calling asking to make an appointment with Kalina Solorio  Patient is stating that he had sx on index finger & is stating that there is a little infection going on right now  Patient is stating that he has had this going on for a couple days  Patient is stating that he has pain, redness, swelling & yellow spot on one corner of the incision  Please advise

## 2018-03-06 DIAGNOSIS — J30.2 SEASONAL ALLERGIC RHINITIS, UNSPECIFIED CHRONICITY, UNSPECIFIED TRIGGER: ICD-10-CM

## 2018-03-06 RX ORDER — FLUTICASONE PROPIONATE 50 MCG
2 SPRAY, SUSPENSION (ML) NASAL DAILY
Qty: 1 BOTTLE | Refills: 2 | Status: SHIPPED | OUTPATIENT
Start: 2018-03-06 | End: 2018-06-05 | Stop reason: SDUPTHER

## 2018-03-27 ENCOUNTER — OFFICE VISIT (OUTPATIENT)
Dept: OBGYN CLINIC | Facility: MEDICAL CENTER | Age: 53
End: 2018-03-27
Payer: COMMERCIAL

## 2018-03-27 VITALS
DIASTOLIC BLOOD PRESSURE: 76 MMHG | BODY MASS INDEX: 28.25 KG/M2 | HEIGHT: 67 IN | SYSTOLIC BLOOD PRESSURE: 115 MMHG | HEART RATE: 80 BPM | WEIGHT: 180 LBS

## 2018-03-27 DIAGNOSIS — R22.41 KNEE MASS, RIGHT: Primary | ICD-10-CM

## 2018-03-27 PROCEDURE — 99213 OFFICE O/P EST LOW 20 MIN: CPT | Performed by: ORTHOPAEDIC SURGERY

## 2018-03-27 NOTE — LETTER
March 27, 2018     Patient: Raisa Martin   YOB: 1965   Date of Visit: 3/27/2018       To Whom it May Concern:    Neisha Banegas is under my professional care  He was seen in my office on 3/27/2018  He may return to work on 3/29/18 on light duty       If you have any questions or concerns, please don't hesitate to call           Sincerely,          Keon Diaz MD        CC: No Recipients

## 2018-03-27 NOTE — PROGRESS NOTES
Orthopaedic Surgery - Office Note  Rigoberto Espinal Dennys Coats (45 y o  male)   : 1965   MRN: [id number]  Encounter Date: 3/27/2018    Chief Complaint   Patient presents with    Right Knee - Follow-up       Assessment / Plan   3 months s/p R knee arthroscopy with partial medial meniscectomy and decompression of cruciate cyst on 17  · Patient was instructed that he may return to work on Thursday on light duty  · Continue ice, compression and NSAIDs prn  · Patient was instructed if his effusion gets bigger between now and his follow up appointment to call and we can aspirate his R knee  Return in about 6 weeks (around 2018) for Recheck  History of Present Illness  Suzanne Chaudhari Freeman Coats is a 45 y o  male who presents 3 months s/p R knee arthroscopy with partial medial meniscectomy and decompression of cruciate cyst on 17  Pt is overall doing well and notices that the swelling in his knee is decreasing at this time  Pt has discontinued physical therapy at this time but is still using the elliptical at home without any problems  Pt states that his pain today is a 7/10 when he forcefully flexes his knee however this is improved since his last visit and he says using a compression sleeve on his knee also helps with his discomfort  Pt denies numbness and tingling  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, patient does car repairs  Review of Systems  Pertinent items are noted in HPI  All other systems were reviewed and are negative  Physical Exam  /76   Pulse 80   Ht 5' 7" (1 702 m)   Wt 81 6 kg (180 lb)   BMI 28 19 kg/m²   Cons: Appears well  No apparent distress  Psych: Alert  Oriented x3  Mood and affect normal   Eyes: PERRLA, EOMI  Resp: Normal effort  No audible wheezing or stridor  CV: Palpable pulse  No discernable arrhythmia  No LE edema  Lymph:  No palpable cervical, axillary, or inguinal lymphadenopathy  Skin: Warm  No palpable masses    No visible lesions  Neuro: Normal muscle tone  Normal and symmetric DTR's  Right Knee Exam  Alignment:  Normal knee alignment  Inspection:  No swelling  Palpation:  No tenderness  mild anterior knee effusion  ROM:  Knee Extension 0  Knee Flexion 130  Strength:  5/5 hip flexors and abductors  5/5 quadriceps and hamstrings  Stability:  No objective knee instability  Stable Varus / Valgus stress, Lachman, and Posterior drawer  Tests:  No pertinent positive or negative tests  Patella:  Patella tracks centrally without crepitus  Neurovascular:  Sensation intact in DP/SP/Cannon/Sa/T nerve distributions  2+ DP & PT pulses  Gait:  Normal     Studies Reviewed  No studies to review    Procedures  No procedures today  Medical, Surgical, Family, and Social History  The patient's medical history, family history, and social history, were reviewed and updated as appropriate  Past Medical History:   Diagnosis Date    Anxiety     Arthritis     Depression     Diabetes mellitus (HCC)     NIDDM    Hyperlipidemia     Lumbar disc disorder     left leg and foot  numbness    Meniscus tear     Other specified joint disorders, right knee     Seasonal allergies     Trigger finger, left index finger     and right       Past Surgical History:   Procedure Laterality Date    ARTHROSCOPY KNEE Left 2/1/2016    Procedure: ARTHROSCOPY KNEE;  Surgeon: Carlos A Florez MD;  [facility];  Service:     ARTHROSCOPY KNEE Right 12/21/2017    Procedure: KNEE ARTHROSCOPY; DECOMPRESSION OF ACL CYST, PARTIAL MEDIAL MENISECTOMY;  Surgeon: Meliton Christensen MD;  [facility];  Service: Orthopedics    FRACTURE SURGERY Right     leg    HERNIA REPAIR      umbilical     FL COLONOSCOPY FLX DX W/COLLJ SPEC WHEN PFRMD N/A 8/18/2016    Procedure: COLONOSCOPY;  Surgeon: Trino Peralta MD;  [facility];   Service: Gastroenterology    FL INCISE FINGER TENDON SHEATH Left 2/6/2018    Procedure: INDEX FINGER TRIGGER RELEASE; LEFT LONG, RING, and SMALL FINGER INJECTIONS;  Surgeon: Gonzalo Roca MD;  [facility];  Service: Orthopedics    TX KNEE SCOPE,MED/LAT MENISECTOMY Left 2/1/2016    Procedure: PARTIAL MENISCECTOMY MEDIAL, SYNOCECTOMY, CHONDROPLASTY;  Surgeon: Corrie Montoya MD;  [facility];  Service: Orthopedics       Family History   Problem Relation Age of Onset    Diabetes Mother     Diabetes Father        Social History     Occupational History    Not on file  Social History Main Topics    Smoking status: Never Smoker    Smokeless tobacco: Never Used    Alcohol use Yes      Comment: few x year    Drug use: No    Sexual activity: Not on file       Allergies   Allergen Reactions    Other Other (See Comments)     Seasonal - nasal congestion  MONOCRYL - SUTURE         Current Outpatient Prescriptions:     fluticasone (FLONASE) 50 mcg/act nasal spray, 2 SPRAYS INTO EACH NOSTRIL DAILY, Disp: 1 Bottle, Rfl: 2    loratadine (CLARITIN) 10 mg tablet, TAKE 1 TABLET DAILY  , Disp: 30 tablet, Rfl: 3    metFORMIN (GLUCOPHAGE) 500 mg tablet, Take 500 mg by mouth daily with breakfast  , Disp: , Rfl:       Soy Ramesh    Scribe Attestation    I,:   Soy Ramesh am acting as a scribe while in the presence of the attending physician :        I,:   Yohannes Vasquez MD personally performed the services described in this documentation    as scribed in my presence :

## 2018-03-29 LAB
ALBUMIN/CREAT UR: 5 MCG/MG CREAT
BUN SERPL-MCNC: 13 MG/DL (ref 7–25)
BUN/CREAT SERPL: ABNORMAL (CALC) (ref 6–22)
CALCIUM SERPL-MCNC: 9.5 MG/DL (ref 8.6–10.3)
CHLORIDE SERPL-SCNC: 103 MMOL/L (ref 98–110)
CO2 SERPL-SCNC: 30 MMOL/L (ref 20–31)
CREAT SERPL-MCNC: 0.79 MG/DL (ref 0.7–1.33)
CREAT UR-MCNC: 175 MG/DL (ref 20–370)
GLUCOSE SERPL-MCNC: 102 MG/DL (ref 65–99)
HBA1C MFR BLD: 5.5 % OF TOTAL HGB
MICROALBUMIN UR-MCNC: 0.9 MG/DL
POTASSIUM SERPL-SCNC: 4.2 MMOL/L (ref 3.5–5.3)
SL AMB EGFR AFRICAN AMERICAN: 118 ML/MIN/1.73M2
SL AMB EGFR NON AFRICAN AMERICAN: 102 ML/MIN/1.73M2
SODIUM SERPL-SCNC: 140 MMOL/L (ref 135–146)

## 2018-05-04 ENCOUNTER — OFFICE VISIT (OUTPATIENT)
Dept: OBGYN CLINIC | Facility: MEDICAL CENTER | Age: 53
End: 2018-05-04
Payer: COMMERCIAL

## 2018-05-04 VITALS
HEIGHT: 67 IN | WEIGHT: 190 LBS | HEART RATE: 87 BPM | BODY MASS INDEX: 29.82 KG/M2 | SYSTOLIC BLOOD PRESSURE: 116 MMHG | DIASTOLIC BLOOD PRESSURE: 73 MMHG

## 2018-05-04 DIAGNOSIS — Z98.890 STATUS POST ARTHROSCOPY OF RIGHT KNEE: Primary | ICD-10-CM

## 2018-05-04 PROCEDURE — 20610 DRAIN/INJ JOINT/BURSA W/O US: CPT | Performed by: PHYSICIAN ASSISTANT

## 2018-05-04 PROCEDURE — 99213 OFFICE O/P EST LOW 20 MIN: CPT | Performed by: PHYSICIAN ASSISTANT

## 2018-05-04 RX ORDER — BUPIVACAINE HYDROCHLORIDE 2.5 MG/ML
4 INJECTION, SOLUTION INFILTRATION; PERINEURAL
Status: COMPLETED | OUTPATIENT
Start: 2018-05-04 | End: 2018-05-04

## 2018-05-04 RX ORDER — LIDOCAINE HYDROCHLORIDE 10 MG/ML
4 INJECTION, SOLUTION INFILTRATION; PERINEURAL
Status: COMPLETED | OUTPATIENT
Start: 2018-05-04 | End: 2018-05-04

## 2018-05-04 RX ORDER — METHYLPREDNISOLONE ACETATE 40 MG/ML
1 INJECTION, SUSPENSION INTRA-ARTICULAR; INTRALESIONAL; INTRAMUSCULAR; SOFT TISSUE
Status: COMPLETED | OUTPATIENT
Start: 2018-05-04 | End: 2018-05-04

## 2018-05-04 RX ADMIN — LIDOCAINE HYDROCHLORIDE 4 ML: 10 INJECTION, SOLUTION INFILTRATION; PERINEURAL at 08:44

## 2018-05-04 RX ADMIN — BUPIVACAINE HYDROCHLORIDE 4 ML: 2.5 INJECTION, SOLUTION INFILTRATION; PERINEURAL at 08:44

## 2018-05-04 RX ADMIN — METHYLPREDNISOLONE ACETATE 1 ML: 40 INJECTION, SUSPENSION INTRA-ARTICULAR; INTRALESIONAL; INTRAMUSCULAR; SOFT TISSUE at 08:44

## 2018-05-04 NOTE — PROGRESS NOTES
Orthopaedic Surgery - Office Note  Jcakson Sigala (45 y o  male)   : 1965   MRN: [id number]  Encounter Date: 2018    Chief Complaint   Patient presents with    Right Knee - Follow-up       Assessment / Plan   S/p R knee arthroscopy with partial medial meniscectomy and decompression of cruciate cyst on 17  · After discussion of risk and benefits the patient agreed to proceed with an aspiration his right knee due to continued effusion with injection of steroid  This was done and prepared sterilely and tolerated well  · Continue ice, compression and NSAIDs prn  · Patient was given the remainder of the day off from work due to the aspiration and may return to work starting tomorrow 2018 without restriction  Return in about 3 months (around 2018)  Patient should notify us earlier if he has any issues with swelling or pain  History of Present Illness  Baron Lindsey Sigala is a 45 y o  male who presents s/p R knee arthroscopy with partial medial meniscectomy and decompression of cruciate cyst on 17  The patient continues with a effusion in the knee, but he definitely feels his knee overall is much better than prior to surgery and is less effusion in when he last had it aspirated in February  The pain that he was experiencing in the posterior aspect of the knee prior to surgery has resolved and the only discomfort he gets now within the area of the distal quads especially with flexion due to the swelling in his knee  He has continued with using a compression wrap and ices or takes anti-inflammatories as needed for pain  Pt denies numbness and tingling  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, patient does car repairs  Review of Systems  Pertinent items are noted in HPI  All other systems were reviewed and are negative  Physical Exam  /73   Pulse 87   Ht 5' 7" (1 702 m)   Wt 86 2 kg (190 lb)   BMI 29 76 kg/m²   Cons: Appears well    No apparent distress  Psych: Alert  Oriented x3  Mood and affect normal   Eyes: PERRLA, EOMI  Resp: Normal effort  No audible wheezing or stridor  CV: Palpable pulse  No discernable arrhythmia  No LE edema  Lymph:  No palpable cervical, axillary, or inguinal lymphadenopathy  Skin: Warm  No palpable masses  No visible lesions  Neuro: Normal muscle tone  Normal and symmetric DTR's  Right Knee Exam  Alignment:  Normal knee alignment  Inspection:  No swelling  Palpation:  No tenderness  Mild effusion  ROM:  Knee Extension 0  Knee Flexion 130  Strength:  5/5 hip flexors and abductors  5/5 quadriceps and hamstrings  Stability:  No objective knee instability  Stable Varus / Valgus stress, Lachman, and Posterior drawer  Tests:  No pertinent positive or negative tests  Patella:  Patella tracks centrally without crepitus  Neurovascular:  Sensation intact in DP/SP/Cannon/Sa/T nerve distributions  2+ DP & PT pulses  Gait:  Normal     Studies Reviewed  No studies to review    Large joint arthrocentesis  Date/Time: 5/4/2018 8:44 AM  Consent given by: patient  Site marked: site marked  Supporting Documentation  Indications: pain and joint swelling   Procedure Details  Location: knee - R knee  Needle size: 18 G  Ultrasound guidance: no  Approach: lateral  Medications administered: 4 mL bupivacaine 0 25 %; 4 mL lidocaine 1 %; 1 mL methylPREDNISolone acetate 40 mg/mL    Aspirate amount: 30 mL  Aspirate: clear, yellow and blood-tinged  Patient tolerance: patient tolerated the procedure well with no immediate complications  Dressing:  Sterile dressing applied           Medical, Surgical, Family, and Social History  The patient's medical history, family history, and social history, were reviewed and updated as appropriate      Past Medical History:   Diagnosis Date    Anxiety     Arthritis     Depression     Diabetes mellitus (HCC)     NIDDM    Hyperlipidemia     Lumbar disc disorder     left leg and foot  numbness    Meniscus tear     Other specified joint disorders, right knee     Seasonal allergies     Trigger finger, left index finger     and right       Past Surgical History:   Procedure Laterality Date    ARTHROSCOPY KNEE Left 2/1/2016    Procedure: ARTHROSCOPY KNEE;  Surgeon: Flaco Myles MD;  [facility];  Service:     ARTHROSCOPY KNEE Right 12/21/2017    Procedure: KNEE ARTHROSCOPY; DECOMPRESSION OF ACL CYST, PARTIAL MEDIAL MENISECTOMY;  Surgeon: Pau De MD;  [facility];  Service: Orthopedics    FRACTURE SURGERY Right     leg    HERNIA REPAIR      umbilical     RI COLONOSCOPY FLX DX W/COLLJ SPEC WHEN PFRMD N/A 8/18/2016    Procedure: COLONOSCOPY;  Surgeon: Nemo Stahl MD;  [facility]; Service: Gastroenterology    RI INCISE FINGER TENDON SHEATH Left 2/6/2018    Procedure: INDEX FINGER TRIGGER RELEASE; LEFT LONG, RING, and SMALL FINGER INJECTIONS;  Surgeon: Luna Kenyon MD;  [facility];  Service: Orthopedics    RI KNEE SCOPE,MED/LAT MENISECTOMY Left 2/1/2016    Procedure: PARTIAL MENISCECTOMY MEDIAL, SYNOCECTOMY, CHONDROPLASTY;  Surgeon: Flaco Myles MD;  [facility];  Service: Orthopedics       Family History   Problem Relation Age of Onset    Diabetes Mother     Kidney disease Mother     Diabetes Father        Social History     Occupational History    [employer]      Social History Main Topics    Smoking status: Never Smoker    Smokeless tobacco: Never Used    Alcohol use Yes      Comment: few x year    Drug use: No    Sexual activity: No       Allergies   Allergen Reactions    Other Other (See Comments)     Seasonal - nasal congestion  MONOCRYL - SUTURE         Current Outpatient Prescriptions:     fluticasone (FLONASE) 50 mcg/act nasal spray, 2 SPRAYS INTO EACH NOSTRIL DAILY, Disp: 1 Bottle, Rfl: 2    loratadine (CLARITIN) 10 mg tablet, TAKE 1 TABLET DAILY  , Disp: 30 tablet, Rfl: 3    metFORMIN (GLUCOPHAGE) 500 mg tablet, Take 500 mg by mouth daily with breakfast  , Disp: , Rfl:       Kassandra Scott PA-C    Scribe Attestation    I,:    am acting as a scribe while in the presence of the attending physician :        I,:    personally performed the services described in this documentation    as scribed in my presence :

## 2018-05-04 NOTE — LETTER
May 4, 2018     Patient: John Paul Ruiz   YOB: 1965   Date of Visit: 5/4/2018       To Whom it May Concern:    Luci Mg is under my professional care  He was seen in my office on 5/4/2018  He did undergo a minor procedure today and should be excused from work for the remainder of the day  He may return to work at full duty after 05/05/2018  If you have any questions or concerns, please don't hesitate to call           Sincerely,          Sal Harris MD        CC: Mandy Jackson

## 2018-06-05 DIAGNOSIS — J30.2 SEASONAL ALLERGIC RHINITIS: ICD-10-CM

## 2018-06-05 RX ORDER — FLUTICASONE PROPIONATE 50 MCG
2 SPRAY, SUSPENSION (ML) NASAL DAILY
Qty: 1 BOTTLE | Refills: 2 | Status: SHIPPED | OUTPATIENT
Start: 2018-06-05 | End: 2018-10-04 | Stop reason: SDUPTHER

## 2018-06-06 NOTE — PROGRESS NOTES
Assessment/Plan:    No problem-specific Assessment & Plan notes found for this encounter  Diagnoses and all orders for this visit:    Seasonal allergic rhinitis, unspecified trigger  -     HEMOGLOBIN A1C W/ EAG ESTIMATION; Future  -     Comprehensive metabolic panel; Future  -     CBC and differential; Future    Obesity, unspecified classification, unspecified obesity type, unspecified whether serious comorbidity present  -     HEMOGLOBIN A1C W/ EAG ESTIMATION; Future  -     Comprehensive metabolic panel; Future  -     CBC and differential; Future  -     Lipid Panel with Direct LDL reflex; Future    Herpangina  -     HEMOGLOBIN A1C W/ EAG ESTIMATION; Future  -     Comprehensive metabolic panel; Future  -     CBC and differential; Future  -     al mag oxide-diphenhydramine-lidocaine viscous (MAGIC MOUTHWASH) 1:1:1 suspension; Swish and spit 10 mL every 4 (four) hours as needed for mouth pain or discomfort  -     Throat culture; Future  -     Throat culture    Sore throat  -     Throat culture; Future  -     Throat culture        FU chronic cond + labs in [facility]  Discussed proper use og magic mouth wash  Call me if sxs persist for >1 week or worsen    Subjective:   Pt here for 6 month follow up visit  Labs done 3/28/18  sores in mouth and sore throat for about 3 weeks  Pt complaining of some anxiety   NEEDS FOOT EXAM TODAY     Patient ID: Michelle Goss is a 45 y o  male      HPI  PHQ-9 Depression Screening    PHQ-9:    Frequency of the following problems over the past two weeks:       Little interest or pleasure in doing things:  2 - more than half the days  Feeling down, depressed, or hopeless:  1 - several days  Trouble falling or staying asleep, or sleeping too much:  1 - several days  Feeling tired or having little energy:  3 - nearly every day  Poor appetite or overeatin - not at all  Feeling bad about yourself - or that you are a failure or have let yourself or your family down:  0 - not at all  Trouble concentrating on things, such as reading the newspaper or watching television:  1 - several days  Moving or speaking so slowly that other people could have noticed  Or the opposite - being so fidgety or restless that you have been moving around a lot more than usual:  0 - not at all  Thoughts that you would be better off dead, or of hurting yourself in some way:  0 - not at all  PHQ-2 Score:  3  PHQ-9 Score:  8       Dm he had stopped his metformin 500mg qd around feb, his last a1c in March was WNL 5 5  He does follow a healthy diet  He has been having sore throat for 3 weeks  He is eating and drinking normally  Admits had subjective fevers but now resolved  Denies any other respiratory sxs except for PND and sometimes runny nose but attributed more to his chronic allergies  No rash  No known sick contacts, he does care for his small grandon who is 3 y/o but not recently sick  The following portions of the patient's history were reviewed and updated as appropriate: allergies, current medications, past family history, past medical history, past social history, past surgical history and problem list     Review of Systems   Constitutional: Negative for activity change and appetite change  HENT: Positive for sore throat  Respiratory: Negative  Cardiovascular: Negative  Gastrointestinal: Negative  Genitourinary: Negative  Musculoskeletal: Negative for arthralgias  Skin: Negative for rash  Psychiatric/Behavioral: Negative  Objective:      /76   Pulse 95   Temp 98 °F (36 7 °C)   Resp 20   Ht 5' 6 53" (1 69 m)   Wt 86 kg (189 lb 9 6 oz)   SpO2 98%   BMI 30 11 kg/m²          Physical Exam   Constitutional: He is oriented to person, place, and time  He appears well-developed and well-nourished  No distress  HENT:   Head: Normocephalic  Right Ear: Tympanic membrane normal    Left Ear: Tympanic membrane normal    Nose: Rhinorrhea (and erythema) present  Mouth/Throat: Uvula is midline  Posterior oropharyngeal erythema present  Eyes: Conjunctivae are normal    Cardiovascular: Normal rate, regular rhythm and normal heart sounds  Pulmonary/Chest: Effort normal and breath sounds normal  No respiratory distress  He has no wheezes  He has no rales  Neurological: He is alert and oriented to person, place, and time  Skin: Skin is warm  No rash noted  Psychiatric: He has a normal mood and affect   His behavior is normal

## 2018-06-07 ENCOUNTER — OFFICE VISIT (OUTPATIENT)
Dept: FAMILY MEDICINE CLINIC | Facility: CLINIC | Age: 53
End: 2018-06-07
Payer: COMMERCIAL

## 2018-06-07 VITALS
RESPIRATION RATE: 20 BRPM | DIASTOLIC BLOOD PRESSURE: 76 MMHG | OXYGEN SATURATION: 98 % | TEMPERATURE: 98 F | WEIGHT: 189.6 LBS | SYSTOLIC BLOOD PRESSURE: 122 MMHG | HEIGHT: 67 IN | BODY MASS INDEX: 29.76 KG/M2 | HEART RATE: 95 BPM

## 2018-06-07 DIAGNOSIS — B08.5 HERPANGINA: ICD-10-CM

## 2018-06-07 DIAGNOSIS — J02.9 SORE THROAT: ICD-10-CM

## 2018-06-07 DIAGNOSIS — E66.9 OBESITY, UNSPECIFIED CLASSIFICATION, UNSPECIFIED OBESITY TYPE, UNSPECIFIED WHETHER SERIOUS COMORBIDITY PRESENT: ICD-10-CM

## 2018-06-07 DIAGNOSIS — J30.2 SEASONAL ALLERGIC RHINITIS, UNSPECIFIED TRIGGER: Primary | ICD-10-CM

## 2018-06-07 PROBLEM — S76.311A: Status: ACTIVE | Noted: 2017-02-21

## 2018-06-07 PROBLEM — M19.049 OA (OSTEOARTHRITIS) OF FINGER: Status: ACTIVE | Noted: 2017-10-05

## 2018-06-07 PROBLEM — G47.30 SEVERE SLEEP APNEA: Status: ACTIVE | Noted: 2017-04-26

## 2018-06-07 PROBLEM — M65.321 TRIGGER INDEX FINGER OF RIGHT HAND: Status: ACTIVE | Noted: 2018-02-06

## 2018-06-07 PROBLEM — S76.311A: Status: RESOLVED | Noted: 2017-02-21 | Resolved: 2018-06-07

## 2018-06-07 PROCEDURE — 99214 OFFICE O/P EST MOD 30 MIN: CPT | Performed by: FAMILY MEDICINE

## 2018-06-07 PROCEDURE — 87070 CULTURE OTHR SPECIMN AEROBIC: CPT | Performed by: FAMILY MEDICINE

## 2018-06-07 PROCEDURE — 3008F BODY MASS INDEX DOCD: CPT | Performed by: FAMILY MEDICINE

## 2018-06-09 LAB — BACTERIA THROAT CULT: NORMAL

## 2018-06-19 DIAGNOSIS — E66.9 OBESITY: ICD-10-CM

## 2018-06-20 DIAGNOSIS — J30.2 SEASONAL ALLERGIC RHINITIS: ICD-10-CM

## 2018-06-20 RX ORDER — LORATADINE 10 MG/1
TABLET ORAL
Qty: 30 TABLET | Refills: 3 | Status: SHIPPED | OUTPATIENT
Start: 2018-06-20 | End: 2018-10-04 | Stop reason: SDUPTHER

## 2018-08-03 ENCOUNTER — OFFICE VISIT (OUTPATIENT)
Dept: OBGYN CLINIC | Facility: MEDICAL CENTER | Age: 53
End: 2018-08-03
Payer: COMMERCIAL

## 2018-08-03 ENCOUNTER — APPOINTMENT (OUTPATIENT)
Dept: LAB | Facility: MEDICAL CENTER | Age: 53
End: 2018-08-03
Payer: COMMERCIAL

## 2018-08-03 VITALS
SYSTOLIC BLOOD PRESSURE: 104 MMHG | DIASTOLIC BLOOD PRESSURE: 69 MMHG | WEIGHT: 189 LBS | BODY MASS INDEX: 29.66 KG/M2 | HEIGHT: 67 IN | HEART RATE: 86 BPM

## 2018-08-03 DIAGNOSIS — M25.461 EFFUSION OF RIGHT KNEE: ICD-10-CM

## 2018-08-03 DIAGNOSIS — M25.461 EFFUSION OF RIGHT KNEE: Primary | ICD-10-CM

## 2018-08-03 LAB
BASOPHILS # BLD AUTO: 0.02 THOUSANDS/ΜL (ref 0–0.1)
BASOPHILS NFR BLD AUTO: 0 % (ref 0–1)
CRP SERPL QL: 3.2 MG/L
EOSINOPHIL # BLD AUTO: 0.08 THOUSAND/ΜL (ref 0–0.61)
EOSINOPHIL NFR BLD AUTO: 2 % (ref 0–6)
ERYTHROCYTE [DISTWIDTH] IN BLOOD BY AUTOMATED COUNT: 16.6 % (ref 11.6–15.1)
ERYTHROCYTE [SEDIMENTATION RATE] IN BLOOD: 14 MM/HOUR (ref 0–10)
HCT VFR BLD AUTO: 41.8 % (ref 36.5–49.3)
HGB BLD-MCNC: 13.9 G/DL (ref 12–17)
IMM GRANULOCYTES # BLD AUTO: 0.01 THOUSAND/UL (ref 0–0.2)
IMM GRANULOCYTES NFR BLD AUTO: 0 % (ref 0–2)
LYMPHOCYTES # BLD AUTO: 1.95 THOUSANDS/ΜL (ref 0.6–4.47)
LYMPHOCYTES NFR BLD AUTO: 37 % (ref 14–44)
MCH RBC QN AUTO: 32.1 PG (ref 26.8–34.3)
MCHC RBC AUTO-ENTMCNC: 33.3 G/DL (ref 31.4–37.4)
MCV RBC AUTO: 97 FL (ref 82–98)
MONOCYTES # BLD AUTO: 0.4 THOUSAND/ΜL (ref 0.17–1.22)
MONOCYTES NFR BLD AUTO: 8 % (ref 4–12)
NEUTROPHILS # BLD AUTO: 2.87 THOUSANDS/ΜL (ref 1.85–7.62)
NEUTS SEG NFR BLD AUTO: 53 % (ref 43–75)
NRBC BLD AUTO-RTO: 0 /100 WBCS
PLATELET # BLD AUTO: 242 THOUSANDS/UL (ref 149–390)
PMV BLD AUTO: 9.9 FL (ref 8.9–12.7)
RBC # BLD AUTO: 4.33 MILLION/UL (ref 3.88–5.62)
WBC # BLD AUTO: 5.33 THOUSAND/UL (ref 4.31–10.16)

## 2018-08-03 PROCEDURE — 86140 C-REACTIVE PROTEIN: CPT

## 2018-08-03 PROCEDURE — 20610 DRAIN/INJ JOINT/BURSA W/O US: CPT | Performed by: PHYSICIAN ASSISTANT

## 2018-08-03 PROCEDURE — 99214 OFFICE O/P EST MOD 30 MIN: CPT | Performed by: PHYSICIAN ASSISTANT

## 2018-08-03 PROCEDURE — 36415 COLL VENOUS BLD VENIPUNCTURE: CPT

## 2018-08-03 PROCEDURE — 87205 SMEAR GRAM STAIN: CPT

## 2018-08-03 PROCEDURE — 83520 IMMUNOASSAY QUANT NOS NONAB: CPT

## 2018-08-03 PROCEDURE — 86039 ANTINUCLEAR ANTIBODIES (ANA): CPT

## 2018-08-03 PROCEDURE — 85652 RBC SED RATE AUTOMATED: CPT

## 2018-08-03 PROCEDURE — 85025 COMPLETE CBC W/AUTO DIFF WBC: CPT

## 2018-08-03 PROCEDURE — 86618 LYME DISEASE ANTIBODY: CPT

## 2018-08-03 PROCEDURE — 86038 ANTINUCLEAR ANTIBODIES: CPT

## 2018-08-03 PROCEDURE — 89051 BODY FLUID CELL COUNT: CPT

## 2018-08-03 PROCEDURE — 86200 CCP ANTIBODY: CPT

## 2018-08-03 PROCEDURE — 89060 EXAM SYNOVIAL FLUID CRYSTALS: CPT

## 2018-08-03 PROCEDURE — 87070 CULTURE OTHR SPECIMN AEROBIC: CPT

## 2018-08-03 PROCEDURE — 86431 RHEUMATOID FACTOR QUANT: CPT

## 2018-08-03 RX ORDER — BUPIVACAINE HYDROCHLORIDE 2.5 MG/ML
4 INJECTION, SOLUTION INFILTRATION; PERINEURAL
Status: COMPLETED | OUTPATIENT
Start: 2018-08-03 | End: 2018-08-03

## 2018-08-03 RX ORDER — METHYLPREDNISOLONE ACETATE 40 MG/ML
1 INJECTION, SUSPENSION INTRA-ARTICULAR; INTRALESIONAL; INTRAMUSCULAR; SOFT TISSUE
Status: COMPLETED | OUTPATIENT
Start: 2018-08-03 | End: 2018-08-03

## 2018-08-03 RX ADMIN — METHYLPREDNISOLONE ACETATE 1 ML: 40 INJECTION, SUSPENSION INTRA-ARTICULAR; INTRALESIONAL; INTRAMUSCULAR; SOFT TISSUE at 10:41

## 2018-08-03 RX ADMIN — BUPIVACAINE HYDROCHLORIDE 4 ML: 2.5 INJECTION, SOLUTION INFILTRATION; PERINEURAL at 10:41

## 2018-08-03 NOTE — PROGRESS NOTES
Orthopaedic Surgery - Office Note  Rashid Brown Favorite (45 y o  male)   : 1965   MRN: [id number]  Encounter Date: 8/3/2018    Chief Complaint   Patient presents with    Right Knee - Follow-up       Assessment / Plan   Right knee effusion s/p R knee arthroscopy with partial medial meniscectomy and decompression of cruciate cyst on 17  · After discussion of risk and benefits the patient agreed to proceed with an aspiration his right knee due to continued effusion with injection of steroid  This was done and prepared sterilely and tolerated well  Aspirate will be sent for cultures, crystals, and cell count  · We also did discuss testing for other sources of infection so he will be sent for blood work for a rheumatoid panel, Lyme titers, CBC, sed rate, and C-reactive protein  · Continue ice, compression and NSAIDs prn  · Patient was given the remainder of the day off from work due to the aspiration and may return to work starting 2018 without restriction  Return in about 6 weeks (around 2018)  Patient should notify us earlier if he has any issues with swelling or pain  Will notify the patient with blood work results or aspirate results if abnormal     History of Present Illness  Ammy Lake is a 45 y o  male who presents s/p R knee arthroscopy with partial medial meniscectomy and decompression of cruciate cyst on 17  The patient continues with a effusion in the knee, but he definitely feels his knee overall is much better than prior to surgery and feels he has less effusion done in the past   He feels that his pain is much improved though he still has stiffness due to the fusion  When he does have discomfort it is more in the medial anterior aspect of the knee  Franklinolome Pinks He has continued with using a compression wrap and ices or takes anti-inflammatories as needed for pain  Pt denies numbness and tingling  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, patient does car repairs  Review of Systems  Pertinent items are noted in HPI  All other systems were reviewed and are negative  Physical Exam  /69   Pulse 86   Ht 5' 7" (1 702 m)   Wt 85 7 kg (189 lb)   BMI 29 60 kg/m²   Cons: Appears well  No apparent distress  Psych: Alert  Oriented x3  Mood and affect normal   Eyes: PERRLA, EOMI  Resp: Normal effort  No audible wheezing or stridor  CV: Palpable pulse  No discernable arrhythmia  No LE edema  Lymph:  No palpable cervical, axillary, or inguinal lymphadenopathy  Skin: Warm  No palpable masses  No visible lesions  Neuro: Normal muscle tone  Normal and symmetric DTR's  Right Knee Exam  Alignment:  Normal knee alignment  Inspection:  Moderate swelling  No deformity  Palpation:  No tenderness  Moderate effusion  ROM:  Knee Extension 0  Knee Flexion 130  Strength:  5/5 hip flexors and abductors  5/5 quadriceps and hamstrings  Stability:  No objective knee instability  Stable Varus / Valgus stress, Lachman, and Posterior drawer  Tests:  No pertinent positive or negative tests  Patella:  Patella tracks centrally without crepitus  Neurovascular:  Sensation intact in DP/SP/Cannon/Sa/T nerve distributions  2+ DP & PT pulses    Gait:  Normal     Studies Reviewed  No studies to review    Large joint arthrocentesis  Date/Time: 8/3/2018 10:41 AM  Consent given by: patient  Site marked: site marked  Supporting Documentation  Indications: joint swelling   Procedure Details  Location: knee - R knee  Needle size: 18 G  Ultrasound guidance: no  Approach: lateral  Medications administered: 4 mL bupivacaine 0 25 %; 1 mL methylPREDNISolone acetate 40 mg/mL    Aspirate: clear, yellow and serous  Analysis: fluid sample sent for laboratory analysis  Patient tolerance: patient tolerated the procedure well with no immediate complications  Dressing:  Sterile dressing applied           Medical, Surgical, Family, and Social History  The patient's medical history, family history, and social history, were reviewed and updated as appropriate  Past Medical History:   Diagnosis Date    Anxiety     Arthritis     Depression     Diabetes mellitus (HCC)     NIDDM    Hyperlipidemia     Lumbar disc disorder     left leg and foot  numbness    Meniscus tear     Other specified joint disorders, right knee     Seasonal allergies     Trigger finger, left index finger     and right       Past Surgical History:   Procedure Laterality Date    ARTHROSCOPY KNEE Left 2/1/2016    Procedure: ARTHROSCOPY KNEE;  Surgeon: Terry Deshpande MD;  [facility];  Service:     ARTHROSCOPY KNEE Right 12/21/2017    Procedure: KNEE ARTHROSCOPY; DECOMPRESSION OF ACL CYST, PARTIAL MEDIAL MENISECTOMY;  Surgeon: Delia Cabrera MD;  [facility];  Service: Orthopedics    FRACTURE SURGERY Right     leg    HERNIA REPAIR      umbilical     NC COLONOSCOPY FLX DX W/COLLJ SPEC WHEN PFRMD N/A 8/18/2016    Procedure: COLONOSCOPY;  Surgeon: Luan Verdugo MD;  [facility];   Service: Gastroenterology    NC INCISE FINGER TENDON SHEATH Left 2/6/2018    Procedure: INDEX FINGER TRIGGER RELEASE; LEFT LONG, RING, and SMALL FINGER INJECTIONS;  Surgeon: Jonas Leon MD;  [facility];  Service: Orthopedics    NC KNEE SCOPE,MED/LAT MENISECTOMY Left 2/1/2016    Procedure: PARTIAL MENISCECTOMY MEDIAL, SYNOCECTOMY, CHONDROPLASTY;  Surgeon: Terry Deshpande MD;  [facility];  Service: Orthopedics       Family History   Problem Relation Age of Onset    Diabetes Mother     Kidney disease Mother     Diabetes Father        Social History     Occupational History    [employer]      Social History Main Topics    Smoking status: Never Smoker    Smokeless tobacco: Never Used    Alcohol use Yes      Comment: few x year    Drug use: No    Sexual activity: No       Allergies   Allergen Reactions    Other Other (See Comments)     Seasonal - nasal congestion  MONOCRYL - SUTURE Current Outpatient Prescriptions:     al mag oxide-diphenhydramine-lidocaine viscous (MAGIC MOUTHWASH) 1:1:1 suspension, Swish and spit 10 mL every 4 (four) hours as needed for mouth pain or discomfort, Disp: 180 mL, Rfl: 0    fluticasone (FLONASE) 50 mcg/act nasal spray, 2 SPRAYS INTO EACH NOSTRIL DAILY, Disp: 1 Bottle, Rfl: 2    loratadine (CLARITIN) 10 mg tablet, TAKE 1 TABLET DAILY  , Disp: 30 tablet, Rfl: 3      Aminata Acosta PA-C    Scribe Attestation    I,:    am acting as a scribe while in the presence of the attending physician :        I,:    personally performed the services described in this documentation    as scribed in my presence :

## 2018-08-03 NOTE — LETTER
August 3, 2018     Patient: Jalen Romano   YOB: 1965   Date of Visit: 8/3/2018       To Whom it May Concern:    Cam Lu is under my professional care  He was seen in my office on 8/3/2018  He should be out of work for the remainder of the day and can return to work on Monday 8/6/2018 without restriction  If you have any questions or concerns, please don't hesitate to call           Sincerely,          Isaac Villarreal MD        CC: Leticia Reyes

## 2018-08-04 LAB
CRYSTALS SNV QL MICRO: NORMAL
HISTIOCYTES NFR SNV MANUAL: 11 %
LYMPHOCYTES # SNV MANUAL: 15 %
MONOCYTES NFR SNV MANUAL: 69 %
NEUTROPHILS NFR SNV MANUAL: 5 %
SITE: ABNORMAL
TOTAL CELLS COUNTED SPEC: 100
WBC # FLD MANUAL: 352 /UL (ref 0–200)

## 2018-08-05 LAB
B BURGDOR IGG SER IA-ACNC: 0.15
B BURGDOR IGM SER IA-ACNC: 0.18

## 2018-08-07 LAB
BACTERIA SPEC BFLD CULT: NO GROWTH
GRAM STN SPEC: NORMAL

## 2018-08-11 LAB — MISCELLANEOUS LAB TEST RESULT: NORMAL

## 2018-09-21 LAB
ALBUMIN SERPL-MCNC: 4.3 G/DL (ref 3.6–5.1)
ALBUMIN/GLOB SERPL: 1.8 (CALC) (ref 1–2.5)
ALP SERPL-CCNC: 62 U/L (ref 40–115)
ALT SERPL-CCNC: 26 U/L (ref 9–46)
AST SERPL-CCNC: 25 U/L (ref 10–35)
BILIRUB SERPL-MCNC: 0.6 MG/DL (ref 0.2–1.2)
BUN SERPL-MCNC: 13 MG/DL (ref 7–25)
BUN/CREAT SERPL: ABNORMAL (CALC) (ref 6–22)
CALCIUM SERPL-MCNC: 9.4 MG/DL (ref 8.6–10.3)
CHLORIDE SERPL-SCNC: 102 MMOL/L (ref 98–110)
CHOLEST SERPL-MCNC: 207 MG/DL
CHOLEST/HDLC SERPL: 5.3 (CALC)
CO2 SERPL-SCNC: 32 MMOL/L (ref 20–32)
CREAT SERPL-MCNC: 0.85 MG/DL (ref 0.7–1.33)
EST. AVERAGE GLUCOSE BLD GHB EST-MCNC: 126 (CALC)
EST. AVERAGE GLUCOSE BLD GHB EST-SCNC: 7 (CALC)
GLOBULIN SER CALC-MCNC: 2.4 G/DL (CALC) (ref 1.9–3.7)
GLUCOSE SERPL-MCNC: 103 MG/DL (ref 65–99)
HBA1C MFR BLD: 6 % OF TOTAL HGB
HDLC SERPL-MCNC: 39 MG/DL
LDLC SERPL CALC-MCNC: 148 MG/DL (CALC)
NONHDLC SERPL-MCNC: 168 MG/DL (CALC)
POTASSIUM SERPL-SCNC: 4.5 MMOL/L (ref 3.5–5.3)
PROT SERPL-MCNC: 6.7 G/DL (ref 6.1–8.1)
SL AMB EGFR AFRICAN AMERICAN: 114 ML/MIN/1.73M2
SL AMB EGFR NON AFRICAN AMERICAN: 99 ML/MIN/1.73M2
SODIUM SERPL-SCNC: 139 MMOL/L (ref 135–146)
TRIGL SERPL-MCNC: 92 MG/DL

## 2018-10-03 NOTE — PROGRESS NOTES
Assessment/Plan:    No problem-specific Assessment & Plan notes found for this encounter  Diagnoses and all orders for this visit:      Obesity, unspecified classification, unspecified obesity type, unspecified whether serious comorbidity present  -     HEMOGLOBIN A1C W/ EAG ESTIMATION; Future  -     Basic metabolic panel; Future  -     Lipid Panel with Direct LDL reflex; Future    Hyperlipidemia, unspecified hyperlipidemia type  -     atorvastatin (LIPITOR) 20 mg tablet; Take 1 tablet (20 mg total) by mouth daily  -     HEMOGLOBIN A1C W/ EAG ESTIMATION; Future  -     Basic metabolic panel; Future  -     Lipid Panel with Direct LDL reflex; Future    Severe sleep apnea  Please make apt to get sleep study done    IFG (impaired fasting glucose)  Lifestyle modifications discussed  -     HEMOGLOBIN A1C W/ EAG ESTIMATION; Future  -     Basic metabolic panel; Future  -     Lipid Panel with Direct LDL reflex; Future    Seasonal allergic rhinitis due to pollen  -     loratadine (CLARITIN) 10 mg tablet; Take 1 tablet (10 mg total) by mouth daily  - flonase 2 spray each nostril qhs    Allergic pharyngitis  - loratadine (CLARITIN) 10 mg tablet; Take 1 tablet (10 mg total) by mouth daily  - flonase 2 spray each nostril qhs    - magic mouth wash PRN for pain,     Fu 6 mo + labs or PRN    Subjective:   Pt here for follow up visit  Labs done 9/20/18  Complaining of sore throat started today  Pt would like refill of magic mouthwash  Pt will receive flu vaccine when available     Patient ID: Marry Higgins is a 45 y o  male  HPI   Patient presents for chronic follow-up with labs  IFG- A1c was 6, on lifestyle modifications  He has couple weeks of sore throat  No sick contacts  + ear itching  Has not done sleep study yet  He snores and sleeps with mouth open and admits to waking up with dry mouth  Denies any heartburn and GERD sxs  Admits to postnasal drip recently       The following portions of the patient's history were reviewed and updated as appropriate: allergies, current medications, past family history, past medical history, past social history, past surgical history and problem list     Review of Systems   Constitutional: Negative for activity change and appetite change  Respiratory: Negative  Cardiovascular: Negative  Gastrointestinal: Negative  Genitourinary: Negative  Musculoskeletal: Negative for arthralgias  Skin: Negative for rash  Psychiatric/Behavioral: Negative  Objective:      /70   Pulse 85   Temp 98 6 °F (37 °C)   Resp 20   Ht 5' 7" (1 702 m)   Wt 83 9 kg (185 lb)   SpO2 97%   BMI 28 98 kg/m²          Physical Exam   Constitutional: He is oriented to person, place, and time  He appears well-developed and well-nourished  HENT:   Head: Normocephalic  Mouth/Throat: Uvula is midline  Posterior oropharyngeal erythema present  No oropharyngeal exudate, posterior oropharyngeal edema or tonsillar abscesses  Eyes: Conjunctivae are normal    Cardiovascular: Normal rate, regular rhythm and normal heart sounds  Pulmonary/Chest: Effort normal and breath sounds normal  No respiratory distress  He has no wheezes  He has no rales  Musculoskeletal: He exhibits no edema  Neurological: He is alert and oriented to person, place, and time  Psychiatric: He has a normal mood and affect   His behavior is normal

## 2018-10-04 ENCOUNTER — OFFICE VISIT (OUTPATIENT)
Dept: FAMILY MEDICINE CLINIC | Facility: CLINIC | Age: 53
End: 2018-10-04
Payer: COMMERCIAL

## 2018-10-04 VITALS
SYSTOLIC BLOOD PRESSURE: 110 MMHG | BODY MASS INDEX: 29.03 KG/M2 | HEIGHT: 67 IN | HEART RATE: 85 BPM | DIASTOLIC BLOOD PRESSURE: 70 MMHG | WEIGHT: 185 LBS | TEMPERATURE: 98.6 F | RESPIRATION RATE: 20 BRPM | OXYGEN SATURATION: 97 %

## 2018-10-04 DIAGNOSIS — J02.9 ALLERGIC PHARYNGITIS: ICD-10-CM

## 2018-10-04 DIAGNOSIS — J30.2 SEASONAL ALLERGIES: Primary | ICD-10-CM

## 2018-10-04 DIAGNOSIS — R73.01 IFG (IMPAIRED FASTING GLUCOSE): ICD-10-CM

## 2018-10-04 DIAGNOSIS — J30.1 SEASONAL ALLERGIC RHINITIS DUE TO POLLEN: ICD-10-CM

## 2018-10-04 DIAGNOSIS — G47.30 SEVERE SLEEP APNEA: ICD-10-CM

## 2018-10-04 DIAGNOSIS — E66.9 OBESITY, UNSPECIFIED CLASSIFICATION, UNSPECIFIED OBESITY TYPE, UNSPECIFIED WHETHER SERIOUS COMORBIDITY PRESENT: ICD-10-CM

## 2018-10-04 DIAGNOSIS — E78.5 HYPERLIPIDEMIA, UNSPECIFIED HYPERLIPIDEMIA TYPE: ICD-10-CM

## 2018-10-04 DIAGNOSIS — J30.89 SEASONAL ALLERGIC RHINITIS DUE TO OTHER ALLERGIC TRIGGER: ICD-10-CM

## 2018-10-04 PROBLEM — B08.5 HERPANGINA: Status: RESOLVED | Noted: 2018-06-07 | Resolved: 2018-10-04

## 2018-10-04 PROBLEM — J30.9 ALLERGIC RHINITIS DUE TO ALLERGEN: Status: ACTIVE | Noted: 2018-10-04

## 2018-10-04 LAB — S PYO AG THROAT QL: NEGATIVE

## 2018-10-04 PROCEDURE — 87880 STREP A ASSAY W/OPTIC: CPT | Performed by: FAMILY MEDICINE

## 2018-10-04 PROCEDURE — 99214 OFFICE O/P EST MOD 30 MIN: CPT | Performed by: FAMILY MEDICINE

## 2018-10-04 RX ORDER — LORATADINE 10 MG/1
10 TABLET ORAL DAILY
Qty: 30 TABLET | Refills: 2 | Status: SHIPPED | OUTPATIENT
Start: 2018-10-04 | End: 2019-02-08

## 2018-10-04 RX ORDER — ATORVASTATIN CALCIUM 20 MG/1
20 TABLET, FILM COATED ORAL DAILY
Qty: 90 TABLET | Refills: 1 | Status: SHIPPED | OUTPATIENT
Start: 2018-10-04 | End: 2019-04-26 | Stop reason: SDUPTHER

## 2018-10-04 RX ORDER — FLUTICASONE PROPIONATE 50 MCG
2 SPRAY, SUSPENSION (ML) NASAL DAILY
Qty: 1 BOTTLE | Refills: 2 | Status: SHIPPED | OUTPATIENT
Start: 2018-10-04 | End: 2018-12-10 | Stop reason: SDUPTHER

## 2018-11-06 ENCOUNTER — OFFICE VISIT (OUTPATIENT)
Dept: OBGYN CLINIC | Facility: MEDICAL CENTER | Age: 53
End: 2018-11-06
Payer: COMMERCIAL

## 2018-11-06 ENCOUNTER — IMMUNIZATION (OUTPATIENT)
Dept: FAMILY MEDICINE CLINIC | Facility: CLINIC | Age: 53
End: 2018-11-06
Payer: COMMERCIAL

## 2018-11-06 VITALS
DIASTOLIC BLOOD PRESSURE: 69 MMHG | HEIGHT: 67 IN | SYSTOLIC BLOOD PRESSURE: 104 MMHG | HEART RATE: 84 BPM | WEIGHT: 189 LBS | BODY MASS INDEX: 29.66 KG/M2

## 2018-11-06 DIAGNOSIS — Z98.890 STATUS POST ARTHROSCOPY OF RIGHT KNEE: Primary | ICD-10-CM

## 2018-11-06 DIAGNOSIS — Z23 NEED FOR INFLUENZA VACCINATION: Primary | ICD-10-CM

## 2018-11-06 DIAGNOSIS — M25.461 EFFUSION OF RIGHT KNEE: ICD-10-CM

## 2018-11-06 PROCEDURE — 20610 DRAIN/INJ JOINT/BURSA W/O US: CPT | Performed by: PHYSICIAN ASSISTANT

## 2018-11-06 PROCEDURE — 99213 OFFICE O/P EST LOW 20 MIN: CPT | Performed by: ORTHOPAEDIC SURGERY

## 2018-11-06 PROCEDURE — 90682 RIV4 VACC RECOMBINANT DNA IM: CPT

## 2018-11-06 PROCEDURE — 90471 IMMUNIZATION ADMIN: CPT

## 2018-11-06 RX ORDER — METHYLPREDNISOLONE ACETATE 40 MG/ML
1 INJECTION, SUSPENSION INTRA-ARTICULAR; INTRALESIONAL; INTRAMUSCULAR; SOFT TISSUE
Status: COMPLETED | OUTPATIENT
Start: 2018-11-06 | End: 2018-11-06

## 2018-11-06 RX ORDER — LIDOCAINE HYDROCHLORIDE 10 MG/ML
8 INJECTION, SOLUTION INFILTRATION; PERINEURAL
Status: COMPLETED | OUTPATIENT
Start: 2018-11-06 | End: 2018-11-06

## 2018-11-06 RX ADMIN — METHYLPREDNISOLONE ACETATE 1 ML: 40 INJECTION, SUSPENSION INTRA-ARTICULAR; INTRALESIONAL; INTRAMUSCULAR; SOFT TISSUE at 10:16

## 2018-11-06 RX ADMIN — LIDOCAINE HYDROCHLORIDE 8 ML: 10 INJECTION, SOLUTION INFILTRATION; PERINEURAL at 10:16

## 2018-11-06 NOTE — LETTER
November 6, 2018     Patient: Brenda Mann   YOB: 1965   Date of Visit: 11/6/2018       To Whom it May Concern:    Batsheva Campbell is under my professional care  He was seen in my office on 11/6/2018  He did undergo a procedure in our office today and should be out of work for the remainder of the day  He may return to work on 11/07/2018 to full duty without restriction  If you have any questions or concerns, please don't hesitate to call           Sincerely,          Felicita West MD        CC: Shin Antoine

## 2018-11-06 NOTE — PROGRESS NOTES
Orthopaedic Surgery - Office Note  Kathleen Rodriguez (45 y o  male)   : 1965   MRN: [id number]  Encounter Date: 2018    Chief Complaint   Patient presents with    Right Knee - Follow-up       Assessment / Plan   Right knee effusion s/p R knee arthroscopy with partial medial meniscectomy and decompression of cruciate cyst on 17  · After discussion of risk and benefits the patient agreed to proceed with an aspiration of 60 mL serous fluid his right knee due to continued effusion with injection of steroid  This was done and prepared sterilely and tolerated well  · Lab work last visit revealed the mildly elevated FELECIA, sed rate, and CRP in the absence infection  At this time recommend follow-up with rheumatologist for further workup  · Continue ice, compression and NSAIDs prn  · Patient was given the remainder of the day off from work due to the aspiration and may return to work tomorrow without restriction  Return in about 3 months (around 2019)  History of Present Illness  Leela Rodriguez is a 45 y o  male who presents s/p R knee arthroscopy with partial medial meniscectomy and decompression of cruciate cyst on 17  Patient did have an aspiration injection last visit and feels that he had pretty good relief of his effusion for about 2 months  Slowly the fusion seemed to come back since  Overall he feels his knee is much better than prior to surgery and feels he has less effusion done in the past  He has continued with using a compression wrap and ices or takes anti-inflammatories as needed for pain  Pt denies numbness and tingling  Review of Systems  Pertinent items are noted in HPI  All other systems were reviewed and are negative  Physical Exam  /69   Pulse 84   Ht 5' 7" (1 702 m)   Wt 85 7 kg (189 lb)   BMI 29 60 kg/m²   Cons: Appears well  No apparent distress  Psych: Alert  Oriented x3    Mood and affect normal   Eyes: ANI EOMI  Resp: Normal effort  No audible wheezing or stridor  CV: Palpable pulse  No discernable arrhythmia  No LE edema  Lymph:  No palpable cervical, axillary, or inguinal lymphadenopathy  Skin: Warm  No palpable masses  No visible lesions  Neuro: Normal muscle tone  Normal and symmetric DTR's  Right Knee Exam  Alignment:  Normal knee alignment  Inspection:  Moderate swelling  No deformity  Palpation:  No tenderness  Mild effusion  ROM:  Knee Extension 0  Knee Flexion 130  Strength:  5/5 hip flexors and abductors  5/5 quadriceps and hamstrings  Stability:  No objective knee instability  Stable Varus / Valgus stress, Lachman, and Posterior drawer  Tests:  No pertinent positive or negative tests  Patella:  Patella tracks centrally without crepitus  Neurovascular:  Sensation intact in DP/SP/Cannon/Sa/T nerve distributions  2+ DP & PT pulses  Gait:  Normal     Studies Reviewed  No studies to review    Large joint arthrocentesis  Date/Time: 11/6/2018 10:16 AM  Consent given by: patient  Site marked: site marked  Timeout: Immediately prior to procedure a time out was called to verify the correct patient, procedure, equipment, support staff and site/side marked as required   Supporting Documentation  Indications: pain and joint swelling   Procedure Details  Location: knee - R knee  Needle size: 18 G  Ultrasound guidance: no  Approach: lateral  Medications administered: 8 mL lidocaine 1 %; 1 mL methylPREDNISolone acetate 40 mg/mL    Aspirate amount: 60 mL  Aspirate: clear, serous and yellow  Patient tolerance: patient tolerated the procedure well with no immediate complications  Dressing:  Sterile dressing applied         Aspiration from 08/03/2018 revealed no crystals, negative cultures  Lab work including rheumatoid and Lyme were negative  Patient did have a positive FELECIA screen with an FELECIA titer of 1:40        Medical, Surgical, Family, and Social History  The patient's medical history, family history, and social history, were reviewed and updated as appropriate  Past Medical History:   Diagnosis Date    Anxiety     Arthritis     Depression     Diabetes mellitus (HCC)     NIDDM    Hyperlipidemia     Lumbar disc disorder     left leg and foot  numbness    Meniscus tear     Other specified joint disorders, right knee     Seasonal allergies     Trigger finger, left index finger     and right       Past Surgical History:   Procedure Laterality Date    ARTHROSCOPY KNEE Left 2/1/2016    Procedure: ARTHROSCOPY KNEE;  Surgeon: Corrie Montoya MD;  [facility];  Service:     ARTHROSCOPY KNEE Right 12/21/2017    Procedure: KNEE ARTHROSCOPY; DECOMPRESSION OF ACL CYST, PARTIAL MEDIAL MENISECTOMY;  Surgeon: Yohannes Vasquez MD;  [facility];  Service: Orthopedics    FRACTURE SURGERY Right     leg    HERNIA REPAIR      umbilical     UT COLONOSCOPY FLX DX W/COLLJ SPEC WHEN PFRMD N/A 8/18/2016    Procedure: COLONOSCOPY;  Surgeon: Tico Ly MD;  [facility];   Service: Gastroenterology    UT INCISE FINGER TENDON SHEATH Left 2/6/2018    Procedure: INDEX FINGER TRIGGER RELEASE; LEFT LONG, RING, and SMALL FINGER INJECTIONS;  Surgeon: Gonzalo Roca MD;  [facility];  Service: Orthopedics    UT KNEE SCOPE,MED/LAT MENISECTOMY Left 2/1/2016    Procedure: PARTIAL MENISCECTOMY MEDIAL, SYNOCECTOMY, CHONDROPLASTY;  Surgeon: Corrie Montoya MD;  [facility];  Service: Orthopedics       Family History   Problem Relation Age of Onset    Diabetes Mother     Kidney disease Mother     Diabetes Father        Social History     Occupational History    [employer]      Social History Main Topics    Smoking status: Never Smoker    Smokeless tobacco: Never Used    Alcohol use Yes      Comment: few x year    Drug use: No    Sexual activity: No       Allergies   Allergen Reactions    Other Other (See Comments)     Seasonal - nasal congestion  MONOCRYL - SUTURE Current Outpatient Prescriptions:     atorvastatin (LIPITOR) 20 mg tablet, Take 1 tablet (20 mg total) by mouth daily, Disp: 90 tablet, Rfl: 1    fluticasone (FLONASE) 50 mcg/act nasal spray, 2 sprays into each nostril daily, Disp: 1 Bottle, Rfl: 2    loratadine (CLARITIN) 10 mg tablet, Take 1 tablet (10 mg total) by mouth daily, Disp: 30 tablet, Rfl: 2    al mag oxide-diphenhydramine-lidocaine viscous (MAGIC MOUTHWASH) 1:1:1 suspension, Swish and spit 10 mL every 4 (four) hours as needed for mouth pain or discomfort (Patient not taking: Reported on 10/4/2018 ), Disp: 180 mL, Rfl: 0      Joey Del Castillo PA-C    Scribe Attestation    I,:    am acting as a scribe while in the presence of the attending physician :        I,:    personally performed the services described in this documentation    as scribed in my presence :

## 2018-12-10 DIAGNOSIS — J30.89 SEASONAL ALLERGIC RHINITIS DUE TO OTHER ALLERGIC TRIGGER: ICD-10-CM

## 2018-12-12 RX ORDER — FLUTICASONE PROPIONATE 50 MCG
SPRAY, SUSPENSION (ML) NASAL
Qty: 1 BOTTLE | Refills: 2 | Status: SHIPPED | OUTPATIENT
Start: 2018-12-12 | End: 2019-04-26 | Stop reason: SDUPTHER

## 2019-02-08 ENCOUNTER — OFFICE VISIT (OUTPATIENT)
Dept: OBGYN CLINIC | Facility: MEDICAL CENTER | Age: 54
End: 2019-02-08
Payer: COMMERCIAL

## 2019-02-08 ENCOUNTER — APPOINTMENT (OUTPATIENT)
Dept: RADIOLOGY | Facility: CLINIC | Age: 54
End: 2019-02-08
Payer: COMMERCIAL

## 2019-02-08 VITALS
WEIGHT: 190 LBS | SYSTOLIC BLOOD PRESSURE: 117 MMHG | HEART RATE: 87 BPM | DIASTOLIC BLOOD PRESSURE: 77 MMHG | HEIGHT: 67 IN | BODY MASS INDEX: 29.82 KG/M2

## 2019-02-08 DIAGNOSIS — Z01.89 ENCOUNTER FOR LOWER EXTREMITY COMPARISON IMAGING STUDY: ICD-10-CM

## 2019-02-08 DIAGNOSIS — M25.461 EFFUSION OF RIGHT KNEE: ICD-10-CM

## 2019-02-08 DIAGNOSIS — Z98.890 STATUS POST ARTHROSCOPY OF RIGHT KNEE: ICD-10-CM

## 2019-02-08 DIAGNOSIS — Z01.89 ENCOUNTER FOR LOWER EXTREMITY COMPARISON IMAGING STUDY: Primary | ICD-10-CM

## 2019-02-08 DIAGNOSIS — M25.561 RIGHT KNEE PAIN, UNSPECIFIED CHRONICITY: ICD-10-CM

## 2019-02-08 PROCEDURE — 99213 OFFICE O/P EST LOW 20 MIN: CPT | Performed by: ORTHOPAEDIC SURGERY

## 2019-02-08 PROCEDURE — 73564 X-RAY EXAM KNEE 4 OR MORE: CPT

## 2019-02-08 PROCEDURE — 20610 DRAIN/INJ JOINT/BURSA W/O US: CPT | Performed by: ORTHOPAEDIC SURGERY

## 2019-02-08 PROCEDURE — 73562 X-RAY EXAM OF KNEE 3: CPT

## 2019-02-08 RX ORDER — LIDOCAINE HYDROCHLORIDE 10 MG/ML
4 INJECTION, SOLUTION INFILTRATION; PERINEURAL
Status: COMPLETED | OUTPATIENT
Start: 2019-02-08 | End: 2019-02-08

## 2019-02-08 RX ADMIN — LIDOCAINE HYDROCHLORIDE 4 ML: 10 INJECTION, SOLUTION INFILTRATION; PERINEURAL at 10:33

## 2019-02-08 NOTE — PROGRESS NOTES
Orthopaedic Surgery - Office Note  Onelia Alistair Souza (52 y o  male)   : 1965   MRN: [id number]  Encounter Date: 2019    Chief Complaint   Patient presents with    Right Knee - Follow-up       Assessment / Plan  Right knee effusion s/p arthroscopy     · A new referral was placed for rheumatology today   · The right knee joint was aspirated today of 30mL, no CSI injection was done today  · Activities as tolerated  Return if symptoms worsen or fail to improve  History of Present Illness  Onelia Alistair Ocampo is a 52 y o  male who presents for follow up regarding his right knee effusion s/p knee arthroscopy done 17  He reports that his symptoms have remained unchanged since his last visit when we aspirated the knee joint and provided him with a CSI  He complains of a "tight" feeling in his knee  He has been wearing his knee compression only occasionally as it is uncomfortable  He denies numbness, tingling or radiating pain  He was referred to rheumatology at his last visit but has yet to make an appointment  Review of Systems  Pertinent items are noted in HPI  All other systems were reviewed and are negative  Physical Exam  /77   Pulse 87   Ht 5' 7" (1 702 m)   Wt 86 2 kg (190 lb)   BMI 29 76 kg/m²   Cons: Appears well  No apparent distress  Psych: Alert  Oriented x3  Mood and affect normal   Eyes: PERRLA, EOMI  Resp: Normal effort  No audible wheezing or stridor  CV: Palpable pulse  No discernable arrhythmia  No LE edema  Lymph:  No palpable cervical, axillary, or inguinal lymphadenopathy  Skin: Warm  No palpable masses  No visible lesions  Neuro: Normal muscle tone  Normal and symmetric DTR's  Right Knee Exam  Alignment:  Normal knee alignment  Inspection:  No swelling  No deformity  Incision healed  Palpation:  large knee effusion  ROM:  Knee Extension 0  Knee Flexion 120  Strength:  5/5 hip flexors and abductors   5/5 quadriceps and hamstrings  Stability:  No objective knee instability  Stable Varus / Valgus stress, Lachman, and Posterior drawer  Tests:  No pertinent positive or negative tests  Patella:  Patella tracks centrally without crepitus  Neurovascular:  Sensation intact in DP/SP/Cannon/Sa/T nerve distributions  2+ DP & PT pulses  Brisk capillary refill in all toes  Toes warm and perfused  Gait:  Normal     Studies Reviewed  I have personally reviewed pertinent films in PACS and my interpretation is x-rays taken 2/8/19 of the bilateral knees show no evidence of arthritis progression from prior films  Large joint arthrocentesis  Date/Time: 2/8/2019 10:33 AM  Consent given by: patient  Site marked: site marked  Supporting Documentation  Indications: joint swelling   Procedure Details  Location: knee - R knee  Preparation: Patient was prepped and draped in the usual sterile fashion  Needle size: 22 G  Ultrasound guidance: no  Approach: lateral  Medications administered: 4 mL lidocaine 1 %    Aspirate amount: 30 mL  Aspirate: blood-tinged  Patient tolerance: patient tolerated the procedure well with no immediate complications  Dressing:  Sterile dressing applied      No procedures today  Medical, Surgical, Family, and Social History  The patient's medical history, family history, and social history, were reviewed and updated as appropriate      Past Medical History:   Diagnosis Date    Anxiety     Arthritis     Depression     Diabetes mellitus (HCC)     NIDDM    Hyperlipidemia     Lumbar disc disorder     left leg and foot  numbness    Meniscus tear     Other specified joint disorders, right knee     Seasonal allergies     Trigger finger, left index finger     and right       Past Surgical History:   Procedure Laterality Date    ARTHROSCOPY KNEE Left 2/1/2016    Procedure: ARTHROSCOPY KNEE;  Surgeon: Chris Nelson MD;  [facility];  Service:     ARTHROSCOPY KNEE Right 12/21/2017    Procedure: Boy Trevino ARTHROSCOPY; DECOMPRESSION OF ACL CYST, PARTIAL MEDIAL MENISECTOMY;  Surgeon: Temitope Karimi MD;  [facility];  Service: Orthopedics    FRACTURE SURGERY Right     leg    HERNIA REPAIR      umbilical     MO COLONOSCOPY FLX DX W/COLLJ SPEC WHEN PFRMD N/A 8/18/2016    Procedure: COLONOSCOPY;  Surgeon: Yulisa Looney MD;  [facility];   Service: Gastroenterology    MO INCISE FINGER TENDON SHEATH Left 2/6/2018    Procedure: INDEX FINGER TRIGGER RELEASE; LEFT LONG, RING, and SMALL FINGER INJECTIONS;  Surgeon: Carmenza Mcdowell MD;  [facility];  Service: Orthopedics    MO KNEE SCOPE,MED/LAT MENISECTOMY Left 2/1/2016    Procedure: PARTIAL MENISCECTOMY MEDIAL, SYNOCECTOMY, CHONDROPLASTY;  Surgeon: Zayda Avelar MD;  [facility];  Service: Orthopedics       Family History   Problem Relation Age of Onset    Diabetes Mother     Kidney disease Mother     Diabetes Father        Social History     Occupational History    [employer]      Social History Main Topics    Smoking status: Never Smoker    Smokeless tobacco: Never Used    Alcohol use Yes      Comment: few x year    Drug use: No    Sexual activity: No       Allergies   Allergen Reactions    Other Other (See Comments)     Seasonal - nasal congestion  MONOCRYL - SUTURE         Current Outpatient Prescriptions:     atorvastatin (LIPITOR) 20 mg tablet, Take 1 tablet (20 mg total) by mouth daily, Disp: 90 tablet, Rfl: 1    fexofenadine (ALLEGRA ODT) 30 MG disintegrating tablet, Take 30 mg by mouth daily, Disp: , Rfl:     fluticasone (FLONASE) 50 mcg/act nasal spray, SPRAY 2 SPRAYS INTO EACH NOSTRIL EVERY DAY, Disp: 1 Bottle, Rfl: 2    al mag oxide-diphenhydramine-lidocaine viscous (MAGIC MOUTHWASH) 1:1:1 suspension, Swish and spit 10 mL every 4 (four) hours as needed for mouth pain or discomfort (Patient not taking: Reported on 10/4/2018 ), Disp: 180 mL, Rfl: 0      Caleb Hilton MA    Scribe Attestation    I,:   Linsey Campbell Dickson Francois am acting as a scribe while in the presence of the attending physician :        I,:   Figueroa Avila MD personally performed the services described in this documentation    as scribed in my presence :

## 2019-02-08 NOTE — LETTER
February 8, 2019     Patient: Hernandez Brannon   YOB: 1965   Date of Visit: 2/8/2019       To Whom it May Concern:    Wendi Riojas is under my professional care  He was seen in my office on 2/8/2019  He may return to work on 2/11/19  If you have any questions or concerns, please don't hesitate to call           Sincerely,          Ale Hoover MD        CC: No Recipients

## 2019-04-23 ENCOUNTER — APPOINTMENT (OUTPATIENT)
Dept: LAB | Facility: HOSPITAL | Age: 54
End: 2019-04-23
Payer: COMMERCIAL

## 2019-04-23 DIAGNOSIS — B08.5 HERPANGINA: ICD-10-CM

## 2019-04-23 DIAGNOSIS — J30.2 SEASONAL ALLERGIES: ICD-10-CM

## 2019-04-23 DIAGNOSIS — E78.5 HYPERLIPIDEMIA, UNSPECIFIED HYPERLIPIDEMIA TYPE: ICD-10-CM

## 2019-04-23 DIAGNOSIS — R73.01 IFG (IMPAIRED FASTING GLUCOSE): ICD-10-CM

## 2019-04-23 DIAGNOSIS — G47.30 SEVERE SLEEP APNEA: ICD-10-CM

## 2019-04-23 DIAGNOSIS — J30.1 SEASONAL ALLERGIC RHINITIS DUE TO POLLEN: ICD-10-CM

## 2019-04-23 DIAGNOSIS — E66.9 OBESITY, UNSPECIFIED CLASSIFICATION, UNSPECIFIED OBESITY TYPE, UNSPECIFIED WHETHER SERIOUS COMORBIDITY PRESENT: ICD-10-CM

## 2019-04-23 DIAGNOSIS — J30.2 SEASONAL ALLERGIC RHINITIS, UNSPECIFIED TRIGGER: ICD-10-CM

## 2019-04-23 DIAGNOSIS — J30.89 SEASONAL ALLERGIC RHINITIS DUE TO OTHER ALLERGIC TRIGGER: ICD-10-CM

## 2019-04-23 LAB
ALBUMIN SERPL BCP-MCNC: 3.7 G/DL (ref 3.5–5)
ALP SERPL-CCNC: 65 U/L (ref 46–116)
ALT SERPL W P-5'-P-CCNC: 32 U/L (ref 12–78)
ANION GAP SERPL CALCULATED.3IONS-SCNC: 1 MMOL/L (ref 4–13)
AST SERPL W P-5'-P-CCNC: 22 U/L (ref 5–45)
BASOPHILS # BLD AUTO: 0.04 THOUSANDS/ΜL (ref 0–0.1)
BASOPHILS NFR BLD AUTO: 1 % (ref 0–1)
BILIRUB SERPL-MCNC: 0.65 MG/DL (ref 0.2–1)
BUN SERPL-MCNC: 13 MG/DL (ref 5–25)
CALCIUM SERPL-MCNC: 8.8 MG/DL (ref 8.3–10.1)
CHLORIDE SERPL-SCNC: 104 MMOL/L (ref 100–108)
CHOLEST SERPL-MCNC: 200 MG/DL (ref 50–200)
CO2 SERPL-SCNC: 32 MMOL/L (ref 21–32)
CREAT SERPL-MCNC: 0.74 MG/DL (ref 0.6–1.3)
EOSINOPHIL # BLD AUTO: 0.17 THOUSAND/ΜL (ref 0–0.61)
EOSINOPHIL NFR BLD AUTO: 4 % (ref 0–6)
ERYTHROCYTE [DISTWIDTH] IN BLOOD BY AUTOMATED COUNT: 14.6 % (ref 11.6–15.1)
EST. AVERAGE GLUCOSE BLD GHB EST-MCNC: 126 MG/DL
GFR SERPL CREATININE-BSD FRML MDRD: 104 ML/MIN/1.73SQ M
GLUCOSE P FAST SERPL-MCNC: 92 MG/DL (ref 65–99)
HBA1C MFR BLD: 6 % (ref 4.2–6.3)
HCT VFR BLD AUTO: 41.8 % (ref 36.5–49.3)
HDLC SERPL-MCNC: 42 MG/DL (ref 40–60)
HGB BLD-MCNC: 13.8 G/DL (ref 12–17)
IMM GRANULOCYTES # BLD AUTO: 0.01 THOUSAND/UL (ref 0–0.2)
IMM GRANULOCYTES NFR BLD AUTO: 0 % (ref 0–2)
LDLC SERPL CALC-MCNC: 144 MG/DL (ref 0–100)
LYMPHOCYTES # BLD AUTO: 1.95 THOUSANDS/ΜL (ref 0.6–4.47)
LYMPHOCYTES NFR BLD AUTO: 41 % (ref 14–44)
MCH RBC QN AUTO: 29.5 PG (ref 26.8–34.3)
MCHC RBC AUTO-ENTMCNC: 33 G/DL (ref 31.4–37.4)
MCV RBC AUTO: 89 FL (ref 82–98)
MONOCYTES # BLD AUTO: 0.51 THOUSAND/ΜL (ref 0.17–1.22)
MONOCYTES NFR BLD AUTO: 11 % (ref 4–12)
NEUTROPHILS # BLD AUTO: 2.14 THOUSANDS/ΜL (ref 1.85–7.62)
NEUTS SEG NFR BLD AUTO: 43 % (ref 43–75)
NRBC BLD AUTO-RTO: 0 /100 WBCS
PLATELET # BLD AUTO: 232 THOUSANDS/UL (ref 149–390)
PMV BLD AUTO: 9.5 FL (ref 8.9–12.7)
POTASSIUM SERPL-SCNC: 4.1 MMOL/L (ref 3.5–5.3)
PROT SERPL-MCNC: 7.1 G/DL (ref 6.4–8.2)
RBC # BLD AUTO: 4.68 MILLION/UL (ref 3.88–5.62)
SODIUM SERPL-SCNC: 137 MMOL/L (ref 136–145)
TRIGL SERPL-MCNC: 69 MG/DL
WBC # BLD AUTO: 4.82 THOUSAND/UL (ref 4.31–10.16)

## 2019-04-23 PROCEDURE — 80061 LIPID PANEL: CPT

## 2019-04-23 PROCEDURE — 83036 HEMOGLOBIN GLYCOSYLATED A1C: CPT

## 2019-04-23 PROCEDURE — 85025 COMPLETE CBC W/AUTO DIFF WBC: CPT

## 2019-04-23 PROCEDURE — 80053 COMPREHEN METABOLIC PANEL: CPT

## 2019-04-23 PROCEDURE — 36415 COLL VENOUS BLD VENIPUNCTURE: CPT

## 2019-04-26 ENCOUNTER — OFFICE VISIT (OUTPATIENT)
Dept: FAMILY MEDICINE CLINIC | Facility: CLINIC | Age: 54
End: 2019-04-26
Payer: COMMERCIAL

## 2019-04-26 VITALS
OXYGEN SATURATION: 98 % | HEIGHT: 67 IN | DIASTOLIC BLOOD PRESSURE: 80 MMHG | WEIGHT: 186.8 LBS | BODY MASS INDEX: 29.32 KG/M2 | TEMPERATURE: 97.9 F | SYSTOLIC BLOOD PRESSURE: 110 MMHG | RESPIRATION RATE: 16 BRPM | HEART RATE: 90 BPM

## 2019-04-26 DIAGNOSIS — Z11.59 ENCOUNTER FOR HEPATITIS C SCREENING TEST FOR LOW RISK PATIENT: Primary | ICD-10-CM

## 2019-04-26 DIAGNOSIS — J30.89 SEASONAL ALLERGIC RHINITIS DUE TO OTHER ALLERGIC TRIGGER: ICD-10-CM

## 2019-04-26 DIAGNOSIS — R73.03 PREDIABETES: ICD-10-CM

## 2019-04-26 DIAGNOSIS — E78.5 HYPERLIPIDEMIA, UNSPECIFIED HYPERLIPIDEMIA TYPE: ICD-10-CM

## 2019-04-26 PROCEDURE — 99214 OFFICE O/P EST MOD 30 MIN: CPT | Performed by: FAMILY MEDICINE

## 2019-04-26 RX ORDER — ATORVASTATIN CALCIUM 40 MG/1
40 TABLET, FILM COATED ORAL DAILY
Qty: 30 TABLET | Refills: 5 | Status: SHIPPED | OUTPATIENT
Start: 2019-04-26

## 2019-04-26 RX ORDER — FLUTICASONE PROPIONATE 50 MCG
2 SPRAY, SUSPENSION (ML) NASAL DAILY
Qty: 1 BOTTLE | Refills: 5 | Status: SHIPPED | OUTPATIENT
Start: 2019-04-26

## 2019-04-26 RX ORDER — VALACYCLOVIR HYDROCHLORIDE 500 MG/1
500 TABLET, FILM COATED ORAL
COMMUNITY
Start: 2016-04-21 | End: 2019-04-26 | Stop reason: ALTCHOICE

## 2019-04-26 RX ORDER — MONTELUKAST SODIUM 10 MG/1
10 TABLET ORAL
Qty: 30 TABLET | Refills: 5 | Status: SHIPPED | OUTPATIENT
Start: 2019-04-26

## 2019-04-27 PROBLEM — J02.9 ALLERGIC PHARYNGITIS: Status: RESOLVED | Noted: 2018-10-04 | Resolved: 2019-04-27

## 2019-04-27 PROBLEM — R73.03 PREDIABETES: Status: ACTIVE | Noted: 2018-10-04

## 2019-05-20 ENCOUNTER — OFFICE VISIT (OUTPATIENT)
Dept: URGENT CARE | Age: 54
End: 2019-05-20
Payer: COMMERCIAL

## 2019-05-20 VITALS
RESPIRATION RATE: 18 BRPM | TEMPERATURE: 98 F | DIASTOLIC BLOOD PRESSURE: 74 MMHG | BODY MASS INDEX: 28.88 KG/M2 | HEART RATE: 68 BPM | OXYGEN SATURATION: 97 % | SYSTOLIC BLOOD PRESSURE: 115 MMHG | HEIGHT: 67 IN | WEIGHT: 184 LBS

## 2019-05-20 DIAGNOSIS — H00.014 HORDEOLUM EXTERNUM OF LEFT UPPER EYELID: Primary | ICD-10-CM

## 2019-05-20 PROCEDURE — G0382 LEV 3 HOSP TYPE B ED VISIT: HCPCS | Performed by: FAMILY MEDICINE

## 2019-05-20 PROCEDURE — S9083 URGENT CARE CENTER GLOBAL: HCPCS | Performed by: FAMILY MEDICINE

## 2019-05-20 RX ORDER — TOBRAMYCIN 3 MG/ML
1 SOLUTION/ DROPS OPHTHALMIC
Qty: 1 BOTTLE | Refills: 0 | Status: SHIPPED | OUTPATIENT
Start: 2019-05-20 | End: 2019-06-07 | Stop reason: ALTCHOICE

## 2019-06-07 ENCOUNTER — OFFICE VISIT (OUTPATIENT)
Dept: FAMILY MEDICINE CLINIC | Facility: CLINIC | Age: 54
End: 2019-06-07
Payer: COMMERCIAL

## 2019-06-07 VITALS
OXYGEN SATURATION: 98 % | HEIGHT: 67 IN | HEART RATE: 83 BPM | SYSTOLIC BLOOD PRESSURE: 115 MMHG | BODY MASS INDEX: 29.82 KG/M2 | TEMPERATURE: 97.9 F | WEIGHT: 190 LBS | DIASTOLIC BLOOD PRESSURE: 76 MMHG | RESPIRATION RATE: 17 BRPM

## 2019-06-07 DIAGNOSIS — H00.016 HORDEOLUM EXTERNUM OF LEFT EYE, UNSPECIFIED EYELID: Primary | ICD-10-CM

## 2019-06-07 DIAGNOSIS — M79.671 RIGHT FOOT PAIN: ICD-10-CM

## 2019-06-07 PROCEDURE — 99214 OFFICE O/P EST MOD 30 MIN: CPT | Performed by: FAMILY MEDICINE

## 2019-06-09 PROBLEM — H00.016 HORDEOLUM EXTERNUM OF LEFT EYE: Status: ACTIVE | Noted: 2019-06-09

## 2019-06-09 PROBLEM — M79.671 RIGHT FOOT PAIN: Status: ACTIVE | Noted: 2019-06-09

## 2019-06-11 DIAGNOSIS — H00.016 HORDEOLUM EXTERNUM OF LEFT EYE, UNSPECIFIED EYELID: Primary | ICD-10-CM

## 2019-06-11 RX ORDER — NEOMYCIN SULFATE, POLYMYXIN B SULFATE AND BACITRACIN ZINC 3.5; 10000; 4 MG/G; [USP'U]/G; [USP'U]/G
0.5 OINTMENT OPHTHALMIC 4 TIMES DAILY
Qty: 3.5 G | Refills: 0 | Status: SHIPPED | OUTPATIENT
Start: 2019-06-11

## 2019-06-17 ENCOUNTER — OFFICE VISIT (OUTPATIENT)
Dept: FAMILY MEDICINE CLINIC | Facility: CLINIC | Age: 54
End: 2019-06-17
Payer: COMMERCIAL

## 2019-06-17 VITALS
RESPIRATION RATE: 18 BRPM | DIASTOLIC BLOOD PRESSURE: 60 MMHG | HEIGHT: 67 IN | HEART RATE: 86 BPM | BODY MASS INDEX: 29.82 KG/M2 | SYSTOLIC BLOOD PRESSURE: 110 MMHG | TEMPERATURE: 99.1 F | OXYGEN SATURATION: 98 % | WEIGHT: 190 LBS

## 2019-06-17 DIAGNOSIS — G47.30 SEVERE SLEEP APNEA: ICD-10-CM

## 2019-06-17 DIAGNOSIS — R53.83 FATIGUE, UNSPECIFIED TYPE: Primary | ICD-10-CM

## 2019-06-17 DIAGNOSIS — E04.9 ENLARGED THYROID: ICD-10-CM

## 2019-06-17 PROBLEM — M79.671 RIGHT FOOT PAIN: Status: RESOLVED | Noted: 2019-06-09 | Resolved: 2019-06-17

## 2019-06-17 PROBLEM — H00.016 HORDEOLUM EXTERNUM OF LEFT EYE: Status: RESOLVED | Noted: 2019-06-09 | Resolved: 2019-06-17

## 2019-06-17 PROCEDURE — 3008F BODY MASS INDEX DOCD: CPT | Performed by: NURSE PRACTITIONER

## 2019-06-17 PROCEDURE — 99214 OFFICE O/P EST MOD 30 MIN: CPT | Performed by: NURSE PRACTITIONER

## 2019-06-17 RX ORDER — AMOXICILLIN AND CLAVULANATE POTASSIUM 500; 125 MG/1; MG/1
1 TABLET, FILM COATED ORAL EVERY 12 HOURS
Refills: 0 | COMMUNITY
Start: 2019-06-10

## 2019-06-17 RX ORDER — ERYTHROMYCIN 5 MG/G
OINTMENT OPHTHALMIC
Refills: 0 | COMMUNITY
Start: 2019-06-10

## 2019-07-03 ENCOUNTER — TRANSCRIBE ORDERS (OUTPATIENT)
Dept: LAB | Facility: HOSPITAL | Age: 54
End: 2019-07-03

## 2019-07-03 DIAGNOSIS — E78.5 HYPERLIPIDEMIA, UNSPECIFIED HYPERLIPIDEMIA TYPE: Primary | ICD-10-CM

## 2019-07-22 ENCOUNTER — TRANSCRIBE ORDERS (OUTPATIENT)
Dept: ADMINISTRATIVE | Facility: HOSPITAL | Age: 54
End: 2019-07-22

## 2019-07-22 DIAGNOSIS — R53.83 OTHER FATIGUE: ICD-10-CM

## 2019-07-22 DIAGNOSIS — E04.9 ENLARGED THYROID: Primary | ICD-10-CM

## 2019-07-27 ENCOUNTER — HOSPITAL ENCOUNTER (OUTPATIENT)
Dept: RADIOLOGY | Facility: HOSPITAL | Age: 54
Discharge: HOME | End: 2019-07-27
Payer: COMMERCIAL

## 2019-07-27 DIAGNOSIS — R53.83 OTHER FATIGUE: ICD-10-CM

## 2019-07-27 DIAGNOSIS — E04.9 ENLARGED THYROID: ICD-10-CM

## 2019-07-27 PROCEDURE — 76536 US EXAM OF HEAD AND NECK: CPT

## 2019-08-13 ENCOUNTER — CONSULT (OUTPATIENT)
Dept: MULTI SPECIALTY CLINIC | Facility: CLINIC | Age: 54
End: 2019-08-13

## 2019-08-13 VITALS
HEART RATE: 72 BPM | SYSTOLIC BLOOD PRESSURE: 104 MMHG | WEIGHT: 190.92 LBS | BODY MASS INDEX: 29.97 KG/M2 | DIASTOLIC BLOOD PRESSURE: 70 MMHG | HEIGHT: 67 IN | TEMPERATURE: 97.6 F

## 2019-08-13 DIAGNOSIS — M21.42 PES PLANUS OF BOTH FEET: ICD-10-CM

## 2019-08-13 DIAGNOSIS — M79.671 RIGHT FOOT PAIN: Primary | ICD-10-CM

## 2019-08-13 DIAGNOSIS — M21.41 PES PLANUS OF BOTH FEET: ICD-10-CM

## 2019-08-13 DIAGNOSIS — M54.16 LUMBAR RADICULOPATHY: ICD-10-CM

## 2019-08-13 PROCEDURE — 99243 OFF/OP CNSLTJ NEW/EST LOW 30: CPT | Performed by: PODIATRIST

## 2019-08-13 RX ORDER — ROSUVASTATIN CALCIUM 5 MG/1
TABLET, COATED ORAL
Refills: 5 | COMMUNITY
Start: 2019-07-18

## 2019-08-13 RX ORDER — TRIAMCINOLONE ACETONIDE 0.1 %
PASTE (GRAM) DENTAL
Refills: 0 | COMMUNITY
Start: 2019-07-18

## 2019-08-13 NOTE — PATIENT INSTRUCTIONS
Please purchase a shoe insert online and use in your work boots  Recommendations:  1  Spenco arch support  2  Superfeet (green)  3  Powerstep

## 2019-08-13 NOTE — PROGRESS NOTES
Assessment/Plan:    Assessment:   Diagnoses and all orders for this visit:    1  Right foot pain  2  Pes planus of both feet  3  Lumbar radiculopathy    Plan:   - Patient was seen evaluated treated with all questions and concerns addressed  - patient was educated on report was recommended to purchase shoes with a wider toe box in sizes E or EE   - patient was educated on effects of pronation on his feet related to his pain, and was recommended over-the-counter orthotic arch supports to purchase and use at home in his shoe gear  - patient was instructed to use ibuprofen and Tylenol PRN pain  - patient was reappointed for 2 months for further evaluation and treatment  Subjective:      Patient ID: Maude Deleon is a 52 y o  male  Mr  Amarilis Ho reports today with a chief complaint lateral right foot pain near the base of the 5th metatarsal   He reports that this pain been persistent for 6 months and is worse at the end of a long day of work  He reports that he has tried nothing to alleviate the pain is just dealt last 6 months  He does report relief at the end of the day upon removal of work shoes  He reports that 12 years ago he received a corticosteroid injection in his back during a bout of left leg radiculopathy  He continues to report some numbness in the LLE but no pain  He also reports that approximately 3 years ago he had meniscal surgery for torn meniscus his left knee  He states that since that time he has put more weight and used his right lower extremity more than his left due to left knee pain  He reports a history a cyst removal in his right popliteal fossa approximately a year and half ago and since this date he has had "electrical pain" in his right calf and foot    Post his right foot pain is related to a prominent base of the 5th metatarsal   He addition reports mild pain in his 1st MPJ joint secondary to bunion deformity he has no further pedal complaints this time denies any nausea, vomiting, chest pain, shortness of breath, chills, fever  The following portions of the patient's history were reviewed and updated as appropriate:   He  has a past medical history of Anxiety, Arthritis, Depression, Diabetes mellitus ([address] ), Hyperlipidemia, Lumbar disc disorder, Meniscus tear, Other specified joint disorders, right knee, Seasonal allergies, and Trigger finger, left index finger  He   Patient Active Problem List    Diagnosis Date Noted    Fatigue 06/17/2019    Enlarged thyroid 06/17/2019    Prediabetes 10/04/2018    Allergic rhinitis due to allergen 10/04/2018    Status post arthroscopy of right knee 05/04/2018    Trigger index finger of right hand 02/06/2018    OA (osteoarthritis) of finger 10/05/2017    Knee mass, right 08/23/2017    Severe sleep apnea 04/26/2017    Lumbar radiculopathy 11/09/2016    Hyperlipidemia 10/26/2016    Localized primary osteoarthritis of lower leg, left 08/02/2016    Obesity 02/26/2016    Tear of lateral meniscus of left knee, current, initial encounter 10/01/2015    Effusion of right knee 09/15/2015    Effusion of left knee 07/14/2015     He  has a past surgical history that includes Hernia repair; pr colonoscopy flx dx w/collj spec when pfrmd (N/A, 8/18/2016); pr knee scope,med/lat menisectomy (Left, 2/1/2016); ARTHROSCOPY KNEE (Left, 2/1/2016); Fracture surgery (Right); ARTHROSCOPY KNEE (Right, 12/21/2017); and pr incise finger tendon sheath (Left, 2/6/2018)  His family history includes Diabetes in his father and mother; Kidney disease in his mother  He  reports that he has never smoked  He has never used smokeless tobacco  He reports that he drinks alcohol  He reports that he does not use drugs    Current Outpatient Medications   Medication Sig Dispense Refill    amoxicillin-clavulanate (AUGMENTIN) 500-125 mg per tablet Take 1 tablet by mouth every 12 (twelve) hours  0    atorvastatin (LIPITOR) 40 mg tablet Take 1 tablet (40 mg total) by mouth daily 30 tablet 5    calcium carbonate-vitamin D (OSCAL-D) 500 mg-200 units per tablet Take 1 tablet by mouth 2 (two) times a day with meals      erythromycin (ILOTYCIN) ophthalmic ointment APPLY IN LEFT EYE AT BEDTIME  0    fexofenadine (ALLEGRA ODT) 30 MG disintegrating tablet Take 1 tablet (30 mg total) by mouth daily 30 tablet 5    fluticasone (FLONASE) 50 mcg/act nasal spray 2 sprays into each nostril daily 1 Bottle 5    montelukast (SINGULAIR) 10 mg tablet Take 1 tablet (10 mg total) by mouth daily at bedtime 30 tablet 5    neomycin-bacitracin-polymyxin (NEOSPORIN) ophthalmic ointment Administer 0 5 inches into the left eye 4 (four) times a day 3 5 g 0    rosuvastatin (CRESTOR) 5 mg tablet TAKE 1 TABLET (5 MG) BY ORAL ROUTE ONCE DAILY IN THE EVENING FOR CHOLESTEROL  5    triamcinolone (KENALOG) 0 1 % oral topical paste PLACE A SMALL AMOUNT TO THE AFFECTED AREA ON TONGUE AND MOUTH 2-3 TIMES DAILY AFTER MEALS  0     No current facility-administered medications for this visit  He is allergic to other       Review of Systems   Constitutional: Negative  Respiratory: Negative  Cardiovascular: Negative  Gastrointestinal: Negative  Musculoskeletal:        Right foot pain   Skin: Negative  Neurological:        LLE: pedal numbness  RLE: "electrical pain"         Objective:      /70 (BP Location: Left arm, Patient Position: Sitting, Cuff Size: Standard)   Pulse 72   Temp 97 6 °F (36 4 °C) (Oral)   Ht 5' 7" (1 702 m)   Wt 86 6 kg (190 lb 14 7 oz)   BMI 29 90 kg/m²          Physical Exam   Constitutional: He appears well-nourished  No distress  Cardiovascular: Intact distal pulses     Pedal hair noted bilateral  Normal skin turgor and temperature noted bilateral   Musculoskeletal:   Mild bunion deformity noted bilateral  Decreased range of motion of the 1st MPJ noted bilateral  Prominent 5th metatarsal base noted bilateral  Manual muscle testing 5 out of bilaterally in all compartments     Neurological:   Protective sensation intact as tested with a 5 07 g SW monofilament  Light touch sensation intact   Skin: Capillary refill takes 2 to 3 seconds  No rash noted  No erythema  No hyperkeratotic buildup noted at the base of the 5th metatarsal bilaterally

## 2020-12-15 ENCOUNTER — NURSE TRIAGE (OUTPATIENT)
Dept: OTHER | Facility: OTHER | Age: 55
End: 2020-12-15

## 2020-12-15 DIAGNOSIS — Z20.828 EXPOSURE TO SARS-ASSOCIATED CORONAVIRUS: ICD-10-CM

## 2020-12-15 DIAGNOSIS — Z20.828 EXPOSURE TO SARS-ASSOCIATED CORONAVIRUS: Primary | ICD-10-CM

## 2020-12-15 PROCEDURE — U0003 INFECTIOUS AGENT DETECTION BY NUCLEIC ACID (DNA OR RNA); SEVERE ACUTE RESPIRATORY SYNDROME CORONAVIRUS 2 (SARS-COV-2) (CORONAVIRUS DISEASE [COVID-19]), AMPLIFIED PROBE TECHNIQUE, MAKING USE OF HIGH THROUGHPUT TECHNOLOGIES AS DESCRIBED BY CMS-2020-01-R: HCPCS | Performed by: FAMILY MEDICINE

## 2020-12-16 LAB — SARS-COV-2 RNA SPEC QL NAA+PROBE: NOT DETECTED

## 2020-12-18 ENCOUNTER — NURSE TRIAGE (OUTPATIENT)
Dept: OTHER | Facility: OTHER | Age: 55
End: 2020-12-18

## 2021-03-17 ENCOUNTER — APPOINTMENT (EMERGENCY)
Dept: RADIOLOGY | Facility: HOSPITAL | Age: 56
End: 2021-03-17
Payer: COMMERCIAL

## 2021-03-17 ENCOUNTER — HOSPITAL ENCOUNTER (EMERGENCY)
Facility: HOSPITAL | Age: 56
Discharge: HOME | End: 2021-03-17
Attending: EMERGENCY MEDICINE
Payer: COMMERCIAL

## 2021-03-17 VITALS
RESPIRATION RATE: 18 BRPM | TEMPERATURE: 97.8 F | WEIGHT: 198 LBS | DIASTOLIC BLOOD PRESSURE: 81 MMHG | OXYGEN SATURATION: 98 % | SYSTOLIC BLOOD PRESSURE: 124 MMHG | HEIGHT: 67 IN | HEART RATE: 66 BPM | BODY MASS INDEX: 31.08 KG/M2

## 2021-03-17 DIAGNOSIS — J12.82 PNEUMONIA DUE TO COVID-19 VIRUS: ICD-10-CM

## 2021-03-17 DIAGNOSIS — R06.00 DYSPNEA: Primary | ICD-10-CM

## 2021-03-17 DIAGNOSIS — U07.1 PNEUMONIA DUE TO COVID-19 VIRUS: ICD-10-CM

## 2021-03-17 LAB
ALBUMIN SERPL BCP-MCNC: 3.5 G/DL (ref 3.5–5)
ALP SERPL-CCNC: 65 U/L (ref 46–116)
ALT SERPL W P-5'-P-CCNC: 30 U/L (ref 12–78)
ANION GAP SERPL CALCULATED.3IONS-SCNC: 9 MMOL/L (ref 4–13)
APTT PPP: 28 SECONDS (ref 23–37)
AST SERPL W P-5'-P-CCNC: 18 U/L (ref 5–45)
ATRIAL RATE: 78 BPM
BASOPHILS # BLD AUTO: 0.04 THOUSANDS/ΜL (ref 0–0.1)
BASOPHILS NFR BLD AUTO: 1 % (ref 0–1)
BILIRUB SERPL-MCNC: 0.24 MG/DL (ref 0.2–1)
BUN SERPL-MCNC: 19 MG/DL (ref 5–25)
CALCIUM SERPL-MCNC: 9.3 MG/DL (ref 8.3–10.1)
CHLORIDE SERPL-SCNC: 104 MMOL/L (ref 100–108)
CO2 SERPL-SCNC: 26 MMOL/L (ref 21–32)
CREAT SERPL-MCNC: 0.85 MG/DL (ref 0.6–1.3)
D DIMER PPP FEU-MCNC: <0.27 UG/ML FEU
EOSINOPHIL # BLD AUTO: 0.2 THOUSAND/ΜL (ref 0–0.61)
EOSINOPHIL NFR BLD AUTO: 3 % (ref 0–6)
ERYTHROCYTE [DISTWIDTH] IN BLOOD BY AUTOMATED COUNT: 14.4 % (ref 11.6–15.1)
GFR SERPL CREATININE-BSD FRML MDRD: 97 ML/MIN/1.73SQ M
GLUCOSE SERPL-MCNC: 135 MG/DL (ref 65–140)
HCT VFR BLD AUTO: 41.9 % (ref 36.5–49.3)
HGB BLD-MCNC: 14.1 G/DL (ref 12–17)
IMM GRANULOCYTES # BLD AUTO: 0.01 THOUSAND/UL (ref 0–0.2)
IMM GRANULOCYTES NFR BLD AUTO: 0 % (ref 0–2)
INR PPP: 0.93 (ref 0.84–1.19)
LIPASE SERPL-CCNC: 150 U/L (ref 73–393)
LYMPHOCYTES # BLD AUTO: 1.76 THOUSANDS/ΜL (ref 0.6–4.47)
LYMPHOCYTES NFR BLD AUTO: 30 % (ref 14–44)
MCH RBC QN AUTO: 28.8 PG (ref 26.8–34.3)
MCHC RBC AUTO-ENTMCNC: 33.7 G/DL (ref 31.4–37.4)
MCV RBC AUTO: 86 FL (ref 82–98)
MONOCYTES # BLD AUTO: 0.44 THOUSAND/ΜL (ref 0.17–1.22)
MONOCYTES NFR BLD AUTO: 7 % (ref 4–12)
NEUTROPHILS # BLD AUTO: 3.51 THOUSANDS/ΜL (ref 1.85–7.62)
NEUTS SEG NFR BLD AUTO: 59 % (ref 43–75)
NRBC BLD AUTO-RTO: 0 /100 WBCS
NT-PROBNP SERPL-MCNC: 40 PG/ML
P AXIS: 54 DEGREES
PLATELET # BLD AUTO: 221 THOUSANDS/UL (ref 149–390)
PMV BLD AUTO: 10.1 FL (ref 8.9–12.7)
POTASSIUM SERPL-SCNC: 4.5 MMOL/L (ref 3.5–5.3)
PR INTERVAL: 176 MS
PROT SERPL-MCNC: 7 G/DL (ref 6.4–8.2)
PROTHROMBIN TIME: 12.6 SECONDS (ref 11.6–14.5)
QRS AXIS: 114 DEGREES
QRSD INTERVAL: 88 MS
QT INTERVAL: 358 MS
QTC INTERVAL: 408 MS
RBC # BLD AUTO: 4.9 MILLION/UL (ref 3.88–5.62)
SODIUM SERPL-SCNC: 139 MMOL/L (ref 136–145)
T WAVE AXIS: 16 DEGREES
TROPONIN I SERPL-MCNC: <0.02 NG/ML
VENTRICULAR RATE: 78 BPM
WBC # BLD AUTO: 5.96 THOUSAND/UL (ref 4.31–10.16)

## 2021-03-17 PROCEDURE — 85025 COMPLETE CBC W/AUTO DIFF WBC: CPT | Performed by: PHYSICIAN ASSISTANT

## 2021-03-17 PROCEDURE — 85730 THROMBOPLASTIN TIME PARTIAL: CPT | Performed by: PHYSICIAN ASSISTANT

## 2021-03-17 PROCEDURE — 93005 ELECTROCARDIOGRAM TRACING: CPT

## 2021-03-17 PROCEDURE — 36415 COLL VENOUS BLD VENIPUNCTURE: CPT | Performed by: PHYSICIAN ASSISTANT

## 2021-03-17 PROCEDURE — 99285 EMERGENCY DEPT VISIT HI MDM: CPT

## 2021-03-17 PROCEDURE — 93010 ELECTROCARDIOGRAM REPORT: CPT | Performed by: INTERNAL MEDICINE

## 2021-03-17 PROCEDURE — 83880 ASSAY OF NATRIURETIC PEPTIDE: CPT | Performed by: PHYSICIAN ASSISTANT

## 2021-03-17 PROCEDURE — 83690 ASSAY OF LIPASE: CPT | Performed by: PHYSICIAN ASSISTANT

## 2021-03-17 PROCEDURE — 85610 PROTHROMBIN TIME: CPT | Performed by: PHYSICIAN ASSISTANT

## 2021-03-17 PROCEDURE — 80053 COMPREHEN METABOLIC PANEL: CPT | Performed by: PHYSICIAN ASSISTANT

## 2021-03-17 PROCEDURE — 84484 ASSAY OF TROPONIN QUANT: CPT | Performed by: PHYSICIAN ASSISTANT

## 2021-03-17 PROCEDURE — 85379 FIBRIN DEGRADATION QUANT: CPT | Performed by: PHYSICIAN ASSISTANT

## 2021-03-17 PROCEDURE — 71045 X-RAY EXAM CHEST 1 VIEW: CPT

## 2021-03-17 PROCEDURE — 99285 EMERGENCY DEPT VISIT HI MDM: CPT | Performed by: PHYSICIAN ASSISTANT

## 2021-03-17 NOTE — ED PROVIDER NOTES
History  Chief Complaint   Patient presents with    Shortness of Breath     Pt sent from PCP for lung check, reports SOB and pain with breathing since COVID+ dx on 3/4  The patient is a 57-year-old male history of hyperlipidemia and diabetes who presents to the emergency department for evaluation of shortness of breath  Patient states that he was diagnosed with COVID-19 on 03/04  He reports that since then, he has had worsening shortness of breath, particularly at night  He additionally reports that he gets a headache in the back of his head and neck when this occurs  He also reports feeling somewhat short of breath during the day, although he notices it more at night  He does not related to exertion  He reports some ongoing nasal congestion and slight cough is full  He states over the last few days, he has been getting an intermittent pain in his left abdomen  He is uncertain of whether not that is related to the COVID-19  Patient did not have any treatment COVID-19  He denies fever, chills, chest pain, nausea, vomiting, diarrhea, rash or dizziness  History provided by:  Patient   used: No    Shortness of Breath  Associated symptoms: abdominal pain and headaches    Associated symptoms: no chest pain, no cough, no ear pain, no fever, no neck pain, no rash, no sore throat, no vomiting and no wheezing        Prior to Admission Medications   Prescriptions Last Dose Informant Patient Reported?  Taking?   amoxicillin-clavulanate (AUGMENTIN) 500-125 mg per tablet   Yes No   Sig: Take 1 tablet by mouth every 12 (twelve) hours   atorvastatin (LIPITOR) 40 mg tablet  Self No No   Sig: Take 1 tablet (40 mg total) by mouth daily   calcium carbonate-vitamin D (OSCAL-D) 500 mg-200 units per tablet   Yes No   Sig: Take 1 tablet by mouth 2 (two) times a day with meals   erythromycin (ILOTYCIN) ophthalmic ointment   Yes No   Sig: APPLY IN LEFT EYE AT BEDTIME   fexofenadine (ALLEGRA ODT) 30 MG disintegrating tablet  Self No No   Sig: Take 1 tablet (30 mg total) by mouth daily   fluticasone (FLONASE) 50 mcg/act nasal spray  Self No No   Si sprays into each nostril daily   montelukast (SINGULAIR) 10 mg tablet  Self No No   Sig: Take 1 tablet (10 mg total) by mouth daily at bedtime   neomycin-bacitracin-polymyxin (NEOSPORIN) ophthalmic ointment   No No   Sig: Administer 0 5 inches into the left eye 4 (four) times a day   rosuvastatin (CRESTOR) 5 mg tablet   Yes No   Sig: TAKE 1 TABLET (5 MG) BY ORAL ROUTE ONCE DAILY IN THE EVENING FOR CHOLESTEROL   triamcinolone (KENALOG) 0 1 % oral topical paste   Yes No   Sig: PLACE A SMALL AMOUNT TO THE AFFECTED AREA ON TONGUE AND MOUTH 2-3 TIMES DAILY AFTER MEALS      Facility-Administered Medications: None       Past Medical History:   Diagnosis Date    Anxiety     Arthritis     Depression     Diabetes mellitus (HCC)     NIDDM    Hyperlipidemia     Lumbar disc disorder     left leg and foot  numbness    Meniscus tear     Other specified joint disorders, right knee     Seasonal allergies     Trigger finger, left index finger     and right       Past Surgical History:   Procedure Laterality Date    ARTHROSCOPY KNEE Left 2016    Procedure: ARTHROSCOPY KNEE;  Surgeon: Maciej Wilson MD;  [facility];  Service:     ARTHROSCOPY KNEE Right 2017    Procedure: KNEE ARTHROSCOPY; DECOMPRESSION OF ACL CYST, PARTIAL MEDIAL MENISECTOMY;  Surgeon: Kirk Shelton MD;  [facility];  Service: Orthopedics    FRACTURE SURGERY Right     leg    HERNIA REPAIR      umbilical     [place] COLONOSCOPY FLX DX W/COLLJ SPEC WHEN PFRMD N/A 2016    Procedure: COLONOSCOPY;  Surgeon: Betzy Ashton MD;  [facility];   Service: Gastroenterology    [place] INCISE FINGER TENDON SHEATH Left 2018    Procedure: INDEX FINGER TRIGGER RELEASE; LEFT LONG, RING, and SMALL FINGER INJECTIONS;  Surgeon: Yen Escobedo MD;  [facility];  Service: Orthopedics    [place] KNEE SCOPE,MED/LAT MENISECTOMY Left 2/1/2016    Procedure: PARTIAL MENISCECTOMY MEDIAL, SYNOCECTOMY, CHONDROPLASTY;  Surgeon: Andres Barrios MD;  [facility];  Service: Orthopedics       Family History   Problem Relation Age of Onset    Diabetes Mother     Kidney disease Mother     Diabetes Father      I have reviewed and agree with the history as documented  E-Cigarette/Vaping     E-Cigarette/Vaping Substances     Social History     Tobacco Use    Smoking status: Never Smoker    Smokeless tobacco: Never Used   Substance Use Topics    Alcohol use: Yes     Comment: few x year    Drug use: No       Review of Systems   Constitutional: Negative for chills and fever  HENT: Negative for ear pain and sore throat  Eyes: Negative for redness and visual disturbance  Respiratory: Positive for shortness of breath  Negative for cough and wheezing  Cardiovascular: Negative for chest pain  Gastrointestinal: Positive for abdominal pain  Negative for diarrhea, nausea and vomiting  Genitourinary: Negative for dysuria and hematuria  Musculoskeletal: Negative for back pain, neck pain and neck stiffness  Skin: Negative for color change and rash  Neurological: Positive for headaches  Negative for dizziness and light-headedness  All other systems reviewed and are negative  Physical Exam  Physical Exam  Vitals signs and nursing note reviewed  Constitutional:       General: He is not in acute distress  Appearance: He is well-developed  He is not ill-appearing or toxic-appearing  HENT:      Head: Normocephalic and atraumatic  Eyes:      Pupils: Pupils are equal, round, and reactive to light  Neck:      Musculoskeletal: Normal range of motion and neck supple  Cardiovascular:      Rate and Rhythm: Normal rate and regular rhythm  Pulses: Normal pulses  Heart sounds: Normal heart sounds     Pulmonary:      Effort: Pulmonary effort is normal       Breath sounds: Normal breath sounds  Abdominal:      General: There is no distension  Palpations: Abdomen is soft  Tenderness: There is no abdominal tenderness  There is no guarding or rebound  Musculoskeletal:      Right lower leg: No edema  Left lower leg: No edema  Skin:     General: Skin is warm and dry  Neurological:      Mental Status: He is alert and oriented to person, place, and time           Vital Signs  ED Triage Vitals [03/17/21 1442]   Temperature Pulse Respirations Blood Pressure SpO2   97 8 °F (36 6 °C) 80 18 124/81 96 %      Temp Source Heart Rate Source Patient Position - Orthostatic VS BP Location FiO2 (%)   Oral Monitor Sitting Right arm --      Pain Score       6           Vitals:    03/17/21 1442 03/17/21 1700   BP: 124/81    Pulse: 80 66   Patient Position - Orthostatic VS: Sitting          Visual Acuity      ED Medications  Medications - No data to display    Diagnostic Studies  Results Reviewed     Procedure Component Value Units Date/Time    D-Dimer [[id number]]  (Normal) Collected: 03/17/21 1517    Lab Status: Final result Specimen: Blood from Arm, Left Updated: 03/17/21 1613     D-Dimer, Quant <0 27 ug/ml FEU     Lipase [[id number]]  (Normal) Collected: 03/17/21 1517    Lab Status: Final result Specimen: Blood from Arm, Left Updated: 03/17/21 1554     Lipase 150 u/L     NT-BNP PRO [[id number]]  (Normal) Collected: 03/17/21 1517    Lab Status: Final result Specimen: Blood from Arm, Left Updated: 03/17/21 1554     NT-proBNP 40 pg/mL     Troponin I [[id number]]  (Normal) Collected: 03/17/21 1517    Lab Status: Final result Specimen: Blood from Arm, Left Updated: 03/17/21 1546     Troponin I <0 02 ng/mL     Protime-INR [[id number]]  (Normal) Collected: 03/17/21 1517    Lab Status: Final result Specimen: Blood from Arm, Left Updated: 03/17/21 1546     Protime 12 6 seconds      INR 0 93    APTT [[id number]]  (Normal) Collected: 03/17/21 1517    Lab Status: Final result Specimen: Blood from Arm, Left Updated: 03/17/21 1546     PTT 28 seconds     Comprehensive metabolic panel [[id number]] Collected: 03/17/21 1517    Lab Status: Final result Specimen: Blood from Arm, Left Updated: 03/17/21 1544     Sodium 139 mmol/L      Potassium 4 5 mmol/L      Chloride 104 mmol/L      CO2 26 mmol/L      ANION GAP 9 mmol/L      BUN 19 mg/dL      Creatinine 0 85 mg/dL      Glucose 135 mg/dL      Calcium 9 3 mg/dL      AST 18 U/L      ALT 30 U/L      Alkaline Phosphatase 65 U/L      Total Protein 7 0 g/dL      Albumin 3 5 g/dL      Total Bilirubin 0 24 mg/dL      eGFR 97 ml/min/1 73sq m     Narrative:      National Kidney Disease Foundation guidelines for Chronic Kidney Disease (CKD):     Stage 1 with normal or high GFR (GFR > 90 mL/min/1 73 square meters)    Stage 2 Mild CKD (GFR = 60-89 mL/min/1 73 square meters)    Stage 3A Moderate CKD (GFR = 45-59 mL/min/1 73 square meters)    Stage 3B Moderate CKD (GFR = 30-44 mL/min/1 73 square meters)    Stage 4 Severe CKD (GFR = 15-29 mL/min/1 73 square meters)    Stage 5 End Stage CKD (GFR <15 mL/min/1 73 square meters)  Note: GFR calculation is accurate only with a steady state creatinine    CBC and differential [[id number]] Collected: 03/17/21 1517    Lab Status: Final result Specimen: Blood from Arm, Left Updated: 03/17/21 1525     WBC 5 96 Thousand/uL      RBC 4 90 Million/uL      Hemoglobin 14 1 g/dL      Hematocrit 41 9 %      MCV 86 fL      MCH 28 8 pg      MCHC 33 7 g/dL      RDW 14 4 %      MPV 10 1 fL      Platelets 399 Thousands/uL      nRBC 0 /100 WBCs      Neutrophils Relative 59 %      Immat GRANS % 0 %      Lymphocytes Relative 30 %      Monocytes Relative 7 %      Eosinophils Relative 3 %      Basophils Relative 1 %      Neutrophils Absolute 3 51 Thousands/µL      Immature Grans Absolute 0 01 Thousand/uL      Lymphocytes Absolute 1 76 Thousands/µL      Monocytes Absolute 0 44 Thousand/µL      Eosinophils Absolute 0 20 Thousand/µL      Basophils Absolute 0 04 Thousands/µL                  XR chest 1 view portable   Final Result by Adrian Solorio MD (03/17 1642)      Slightly increased opacity in the left base, partly due to calcified costal cartilage  Minimal parenchymal disease from Covid 19 cannot be excluded  Workstation performed: [id number]                    Procedures  ECG 12 Lead Documentation Only    Date/Time: 3/17/2021 9:15 PM  Performed by: Shayla Stewart PA-C  Authorized by: Radha Linder PA-C     Comments:      Normal sinus rhythm at 78  Right axis deviation  No acute ST-T changes  No significant change noted from previous on 08/08/2006  ED Course  ED Course as of Mar 17 2119   Wed Mar 17, 2021   1652 Patient is resting comfortably  Workup unremarkable other than mild COVID pneumonia  Will obtain ambulatory pulse ox readings  MDM  Number of Diagnoses or Management Options  Dyspnea: new and requires workup  Pneumonia due to COVID-19 virus: new and requires workup  Diagnosis management comments: Patient presents for evaluation of shortness of breath  Patient tested positive COVID-19 on 03/04/2021  Patient is also complaining of some intermittent left-sided abdominal pain, however he is currently having pain  His abdomen is soft and nontender on exam at this time  I suspect that the symptoms are likely related to the patient's COVID-19 infection  Labs, imaging and ECG ordered  Patient is saturating on room air at 97%  Labs reviewed with no significant findings  ECG does not show any acute ischemic change  Chest x-ray she does show mild opacity consistent with pneumonia due to COVID-19  These results were discussed with the patient  Patient was ambulated around his room, and his oxygen saturation remained normal     I advised the patient at this time, he is too far out from when his symptoms began for any treatments for COVID-19    I advised there is no need to bring him into the emergency department at this time, however I advised close follow-up with primary care  I advised to return to the emergency department if he develops any significantly worsening shortness of breath or if he develops chest pain  Patient is stable for discharge  Amount and/or Complexity of Data Reviewed  Clinical lab tests: ordered and reviewed  Tests in the radiology section of CPT®: ordered and reviewed  Decide to obtain previous medical records or to obtain history from someone other than the patient: yes  Review and summarize past medical records: yes    Risk of Complications, Morbidity, and/or Mortality  Presenting problems: low  Diagnostic procedures: low  Management options: low    Patient Progress  Patient progress: stable      Disposition  Final diagnoses:   Dyspnea   Pneumonia due to COVID-19 virus     Time reflects when diagnosis was documented in both MDM as applicable and the Disposition within this note     Time User Action Codes Description Comment    3/17/2021  5:16 PM Radha Bone Add [R06 00] Dyspnea     3/17/2021  5:16 PM Pratima Bone Add [U07 1,  J12 82] Pneumonia due to COVID-19 virus       ED Disposition     ED Disposition Condition Date/Time Comment    Discharge Stable Wed Mar 17, 2021  5:16 PM Ghada Nelson discharge to home/self care              Follow-up Information     Follow up With Specialties Details Why Contact Info Additional Judie [address], COLLE[facility] Nurse Practitioner Schedule an appointment as soon as possible for a visit in 2 days  [address] [address] [facility] [place]       [address] [address] Emergency Department Emergency Medicine  If symptoms worsen [address] Emergency Department, [address], [place], [facility], [address]          Discharge Medication List as of 3/17/2021  5:17 PM      CONTINUE these medications which have NOT CHANGED    Details   amoxicillin-clavulanate (AUGMENTIN) 500-125 mg per tablet Take 1 tablet by mouth every 12 (twelve) hours, Starting Mon 6/10/2019, Historical Med      atorvastatin (LIPITOR) 40 mg tablet Take 1 tablet (40 mg total) by mouth daily, Starting Fri 4/26/2019, Normal      calcium carbonate-vitamin D (OSCAL-D) 500 mg-200 units per tablet Take 1 tablet by mouth 2 (two) times a day with meals, Historical Med      erythromycin (ILOTYCIN) ophthalmic ointment APPLY IN LEFT EYE AT BEDTIME, Historical Med      fexofenadine (ALLEGRA ODT) 30 MG disintegrating tablet Take 1 tablet (30 mg total) by mouth daily, Starting Fri 4/26/2019, Normal      fluticasone (FLONASE) 50 mcg/act nasal spray 2 sprays into each nostril daily, Starting Fri 4/26/2019, Normal      montelukast (SINGULAIR) 10 mg tablet Take 1 tablet (10 mg total) by mouth daily at bedtime, Starting Fri 4/26/2019, Normal      neomycin-bacitracin-polymyxin (NEOSPORIN) ophthalmic ointment Administer 0 5 inches into the left eye 4 (four) times a day, Starting Tue 6/11/2019, Normal      rosuvastatin (CRESTOR) 5 mg tablet TAKE 1 TABLET (5 MG) BY ORAL ROUTE ONCE DAILY IN THE EVENING FOR CHOLESTEROL, Historical Med      triamcinolone (KENALOG) 0 1 % oral topical paste PLACE A SMALL AMOUNT TO THE AFFECTED AREA ON TONGUE AND MOUTH 2-3 TIMES DAILY AFTER MEALS, Historical Med           No discharge procedures on file      PDMP Review     None          ED Provider  Electronically Signed by           Wilber Suarez PA-C  03/17/21 4868

## 2021-03-17 NOTE — ED NOTES
Ambulated in hallway without difficulty    sao2 remained at 98percent throughut     Holly Kiser RN  03/17/21 2348

## 2021-03-23 ENCOUNTER — HOSPITAL ENCOUNTER (OUTPATIENT)
Dept: RADIOLOGY | Facility: HOSPITAL | Age: 56
Discharge: HOME | End: 2021-03-23
Payer: COMMERCIAL

## 2021-03-23 ENCOUNTER — TRANSCRIBE ORDERS (OUTPATIENT)
Dept: RADIOLOGY | Facility: HOSPITAL | Age: 56
End: 2021-03-23

## 2021-03-23 DIAGNOSIS — U07.1 PNEUMONIA DUE TO 2019-NCOV: Primary | ICD-10-CM

## 2021-03-23 DIAGNOSIS — J12.82 PNEUMONIA DUE TO 2019-NCOV: ICD-10-CM

## 2021-03-23 DIAGNOSIS — J12.82 PNEUMONIA DUE TO 2019-NCOV: Primary | ICD-10-CM

## 2021-03-23 DIAGNOSIS — U07.1 PNEUMONIA DUE TO 2019-NCOV: ICD-10-CM

## 2021-03-23 PROCEDURE — 71046 X-RAY EXAM CHEST 2 VIEWS: CPT

## 2021-04-05 ENCOUNTER — OFFICE VISIT (OUTPATIENT)
Dept: PULMONOLOGY | Facility: CLINIC | Age: 56
End: 2021-04-05
Payer: COMMERCIAL

## 2021-04-05 VITALS
DIASTOLIC BLOOD PRESSURE: 80 MMHG | HEIGHT: 67 IN | TEMPERATURE: 97.3 F | WEIGHT: 207 LBS | OXYGEN SATURATION: 97 % | SYSTOLIC BLOOD PRESSURE: 116 MMHG | HEART RATE: 63 BPM | BODY MASS INDEX: 32.49 KG/M2

## 2021-04-05 DIAGNOSIS — U07.1 COVID-19 VIRUS INFECTION: ICD-10-CM

## 2021-04-05 DIAGNOSIS — J30.89 SEASONAL ALLERGIC RHINITIS DUE TO OTHER ALLERGIC TRIGGER: ICD-10-CM

## 2021-04-05 DIAGNOSIS — G47.30 SEVERE SLEEP APNEA: Primary | ICD-10-CM

## 2021-04-05 PROCEDURE — 1036F TOBACCO NON-USER: CPT | Performed by: INTERNAL MEDICINE

## 2021-04-05 PROCEDURE — 99244 OFF/OP CNSLTJ NEW/EST MOD 40: CPT | Performed by: INTERNAL MEDICINE

## 2021-04-05 PROCEDURE — 3008F BODY MASS INDEX DOCD: CPT | Performed by: INTERNAL MEDICINE

## 2021-04-05 RX ORDER — ALBUTEROL SULFATE 90 UG/1
2 AEROSOL, METERED RESPIRATORY (INHALATION) EVERY 6 HOURS PRN
COMMUNITY

## 2021-04-05 RX ORDER — ALBUTEROL SULFATE 2.5 MG/3ML
2.5 SOLUTION RESPIRATORY (INHALATION) EVERY 6 HOURS PRN
COMMUNITY

## 2021-04-05 NOTE — ASSESSMENT & PLAN NOTE
Resolved completely, continues to have mild dyspnea on exertion but improving  In the future if this persists I can do PFTs but no need at this time

## 2021-04-05 NOTE — ASSESSMENT & PLAN NOTE
Continue Flonase and Singulair for now, make    In the future and  Monitor symptoms and probably use as needed

## 2021-04-05 NOTE — PROGRESS NOTES
Consultation - Pulmonary Medicine   MEDICAL CENTER [facility] Evette Carter 60 y o  male MRN: [id number]    Physician Requesting Consult: NANCY Sellers  Reason for Consult:  Shortness of breath    COVID-19 virus infection   Resolved completely, continues to have mild dyspnea on exertion but improving  In the future if this persists I can do PFTs but no need at this time  Severe sleep apnea    Will check sleep study    Allergic rhinitis due to allergen   Continue Flonase and Singulair for now, make  In the future and  Monitor symptoms and probably use as needed      Diagnoses and all orders for this visit:    Severe sleep apnea  -     Diagnostic Sleep Study; Future    Seasonal allergic rhinitis due to other allergic trigger    COVID-19 virus infection    Other orders  -     albuterol (2 5 mg/3 mL) 0 083 % nebulizer solution; Take 2 5 mg by nebulization every 6 (six) hours as needed for wheezing or shortness of breath  -     albuterol (PROVENTIL HFA,VENTOLIN HFA) 90 mcg/act inhaler; Inhale 2 puffs every 6 (six) hours as needed for wheezing      ______________________________________________________________________    HPI:    Rosa Pham is a 60 y o  male who presents   For evaluation of dyspnea on exertion that started after COVID-19 pneumonia 2 months ago  patient and his family all have COVID pneumonia and at that time he was treated as an outpatient at home without need of any specific treatment or oxygen  At some point he had chest x-ray that showed possible infiltrates and improved  He had cough and sputum production with shortness of breath and fatigue and he improved significantly and currently continues to have dyspnea on exertion without cough or other symptoms, denies chest pain, denies fever or chills or night sweats  He recently started to feel congestion in his upper airways including sinuses/ nasal congestion with some postnasal drip and was started on Flonase  And Singulair   He was treated with amoxicillin for the possible pneumonia and completed the course and since then has been complaining of some burning sensation in his throat  Sometimes  He denies history of pulmonary disease or asthma  He never smoked cigarettes  He lives at home with his wife and children, no pets at home, no exposure to birds, 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  In general he feels his shortness of breath is improving  Patient has snoring and excessive daytime sleepiness and sleep choking, he has restless leg syndrome and chronic fatigue  He was told that he may have sleep apnea but did not have workup before  Review of Systems:  Review of Systems   Constitutional: Negative  HENT: Positive for postnasal drip  Eyes: Negative  Respiratory: Positive for shortness of breath  Cardiovascular: Negative  Gastrointestinal: Negative  Endocrine: Negative  Genitourinary: Negative  Musculoskeletal: Negative  Skin: Negative  Allergic/Immunologic: Negative  Neurological: Negative  Hematological: Negative  Psychiatric/Behavioral: Negative  Aside from what is mentioned in the HPI, the review of systems otherwise negative      Current Medications:    Current Outpatient Medications:     albuterol (2 5 mg/3 mL) 0 083 % nebulizer solution, Take 2 5 mg by nebulization every 6 (six) hours as needed for wheezing or shortness of breath, Disp: , Rfl:     albuterol (PROVENTIL HFA,VENTOLIN HFA) 90 mcg/act inhaler, Inhale 2 puffs every 6 (six) hours as needed for wheezing, Disp: , Rfl:     calcium carbonate-vitamin D (OSCAL-D) 500 mg-200 units per tablet, Take 1 tablet by mouth 2 (two) times a day with meals, Disp: , Rfl:     erythromycin (ILOTYCIN) ophthalmic ointment, APPLY IN LEFT EYE AT BEDTIME, Disp: , Rfl: 0    fexofenadine (ALLEGRA ODT) 30 MG disintegrating tablet, Take 1 tablet (30 mg total) by mouth daily, Disp: 30 tablet, Rfl: 5    fluticasone (FLONASE) 50 mcg/act nasal spray, 2 sprays into each nostril daily, Disp: 1 Bottle, Rfl: 5    montelukast (SINGULAIR) 10 mg tablet, Take 1 tablet (10 mg total) by mouth daily at bedtime, Disp: 30 tablet, Rfl: 5    neomycin-bacitracin-polymyxin (NEOSPORIN) ophthalmic ointment, Administer 0 5 inches into the left eye 4 (four) times a day, Disp: 3 5 g, Rfl: 0    amoxicillin-clavulanate (AUGMENTIN) 500-125 mg per tablet, Take 1 tablet by mouth every 12 (twelve) hours, Disp: , Rfl: 0    atorvastatin (LIPITOR) 40 mg tablet, Take 1 tablet (40 mg total) by mouth daily (Patient not taking: Reported on 4/5/2021), Disp: 30 tablet, Rfl: 5    rosuvastatin (CRESTOR) 5 mg tablet, TAKE 1 TABLET (5 MG) BY ORAL ROUTE ONCE DAILY IN THE EVENING FOR CHOLESTEROL, Disp: , Rfl: 5    triamcinolone (KENALOG) 0 1 % oral topical paste, PLACE A SMALL AMOUNT TO THE AFFECTED AREA ON TONGUE AND MOUTH 2-3 TIMES DAILY AFTER MEALS, Disp: , Rfl: 0    Historical Information   Past Medical History:   Diagnosis Date    Anxiety     Arthritis     Depression     Diabetes mellitus (HCC)     NIDDM    Hyperlipidemia     Lumbar disc disorder     left leg and foot  numbness    Meniscus tear     Other specified joint disorders, right knee     Seasonal allergies     Trigger finger, left index finger     and right     Past Surgical History:   Procedure Laterality Date    ARTHROSCOPY KNEE Left 2/1/2016    Procedure: ARTHROSCOPY KNEE;  Surgeon: Frank Mckinnon MD;  [facility];  Service:     ARTHROSCOPY KNEE Right 12/21/2017    Procedure: KNEE ARTHROSCOPY; DECOMPRESSION OF ACL CYST, PARTIAL MEDIAL MENISECTOMY;  Surgeon: Susan Nobles MD;  [facility];  Service: Orthopedics    FRACTURE SURGERY Right     leg    HERNIA REPAIR      umbilical     [place] COLONOSCOPY FLX DX W/COLLJ SPEC WHEN PFRMD N/A 8/18/2016    Procedure: COLONOSCOPY;  Surgeon: Oz Muse MD;  [facility];   Service: Gastroenterology    [place] INCISE FINGER TENDON SHEATH Left 2/6/2018    Procedure: INDEX FINGER TRIGGER RELEASE; LEFT LONG, RING, and SMALL FINGER INJECTIONS;  Surgeon: Chip Mathis MD;  [facility];  Service: Orthopedics    [place] KNEE SCOPE,MED/LAT MENISECTOMY Left 2/1/2016    Procedure: PARTIAL MENISCECTOMY MEDIAL, SYNOCECTOMY, CHONDROPLASTY;  Surgeon: Tu Murphy MD;  [facility];  Service: Orthopedics     Social History   Social History     Tobacco Use   Smoking Status Never Smoker   Smokeless Tobacco Never Used       Occupational history:    Nonspecific chemicals exposure  From his work as an     Family History:   Family History   Problem Relation Age of Onset    Diabetes Mother     Kidney disease Mother     Diabetes Father          PhysicalExamination:  Vitals:   /80 (BP Location: Left arm, Patient Position: Sitting, Cuff Size: Standard)   Pulse 63   Temp (!) 97 3 °F (36 3 °C) (Tympanic)   Ht 5' 7" (1 702 m)   Wt 93 9 kg (207 lb)   SpO2 97%   BMI 32 42 kg/m²       General: alert, not in acute distress  HEENT: PERRL, no icteric sclera or cyanosis, no thrush  Neck:  Supple, no lymphadenopathy or thyromegaly, no JVD  Lungs:  Equal breath sounds and clear auscultations bilaterally, no wheezing or crackles  Heart: S1 S2 regular, no murmurs or gallops  Abdomen: soft, nontender, bowel sounds  present  Extremities: No edema, no clubbing or cyanosis  Neuro: Alert and oriented x 3, no focal neuro deficits   Skin: intact, no rashes      Diagnostic Data:  Labs:   I personally reviewed the most recent laboratory data pertinent to today's visit    Lab Results   Component Value Date    WBC 5 96 03/17/2021    HGB 14 1 03/17/2021    HCT 41 9 03/17/2021    MCV 86 03/17/2021     03/17/2021     Lab Results   Component Value Date    GLUCOSE 109 08/03/2015    CALCIUM 9 3 03/17/2021     11/06/2017    K 4 5 03/17/2021    CO2 26 03/17/2021     03/17/2021    BUN 19 03/17/2021    CREATININE 0 85 03/17/2021     No results found for: IGE  Lab Results   Component Value Date    ALT 30 03/17/2021    AST 18 03/17/2021    ALKPHOS 65 03/17/2021    BILITOT 0 5 05/09/2017         Imaging:  I personally reviewed the images on the [facility] system pertinent to today's visit    Chest x-ray reviewed on PACs: Clear lungs and resolution of questionable left base minimal infiltrates seen on prior chest x-ray that was also reviewed on PACs        Jorge Luis Prado MD

## 2021-04-15 ENCOUNTER — HOSPITAL ENCOUNTER (OUTPATIENT)
Dept: SLEEP CENTER | Facility: CLINIC | Age: 56
Discharge: HOME | End: 2021-04-15
Payer: COMMERCIAL

## 2021-04-15 DIAGNOSIS — G47.30 SEVERE SLEEP APNEA: ICD-10-CM

## 2021-04-15 PROCEDURE — 95810 POLYSOM 6/> YRS 4/> PARAM: CPT | Performed by: INTERNAL MEDICINE

## 2021-04-15 PROCEDURE — 95810 POLYSOM 6/> YRS 4/> PARAM: CPT

## 2021-04-16 DIAGNOSIS — G47.33 OBSTRUCTIVE SLEEP APNEA: Primary | ICD-10-CM

## 2021-04-16 NOTE — PROGRESS NOTES
Sleep Study Documentation    Pre-Sleep Study       Sleep testing procedure explained to patient:YES    Patient napped prior to study:NO    Caffeine:Dayshift worker after 12PM   Caffeine use:YES- coffee  6 ounces and tea  6 ounces    Alcohol:Dayshift workers after 5PM: Alcohol use:NO    Typical day for patient       Study Documentation    Sleep Study Indications: Snoring, RLS, BMI>30, EDS    Sleep Study: Diagnostic   Snore: Moderate  Supplemental O2: no    O2 flow rate (L/min) range   O2 flow rate (L/min) final   Minimum SaO2 82%  Baseline SaO2 94%        Mode of Therapy    EKG abnormalities: no     EEG abnormalities: no    Sleep Study Recorded < 2 hours: N/A    Sleep Study Recorded > 2 hours but incomplete study: N/A    Sleep Study Recorded 6 hours but no sleep obtained: NO    Patient classification: employed       Post-Sleep Study    Medication used at bedtime or during sleep study:YES over the counter sleep aid    Patient reports time it took to fall asleep:20 to 30 minutes    Patient reports waking up during study:1 to 2 times  Patient reports returning to sleep without difficulty  Patient reports sleeping 6 to 8 hours without dreaming  Patient reports sleep during study:typical    Patient rated sleepiness: Somewhat sleepy or tired    PAP treatment:no

## 2021-04-21 ENCOUNTER — TELEPHONE (OUTPATIENT)
Dept: SLEEP CENTER | Facility: CLINIC | Age: 56
End: 2021-04-21

## 2021-04-21 DIAGNOSIS — Z01.812 ENCOUNTER FOR PREPROCEDURE SCREENING LABORATORY TESTING FOR COVID-19: Primary | ICD-10-CM

## 2021-04-21 DIAGNOSIS — Z20.822 ENCOUNTER FOR PREPROCEDURE SCREENING LABORATORY TESTING FOR COVID-19: Primary | ICD-10-CM

## 2021-04-21 NOTE — TELEPHONE ENCOUNTER
Advised patient sleep study shows moderate WILMAR  Scheduled patient for CPAP study  Discussed COVID protocol:  Patient agreeable to have COVID test on 7/6/2021, for PAP study on 7/13/2021  We ask that you limit interpersonal interactions as much as possible and observe all social distancing and masking precautions from the time of your testing to the time of your study    COVID order placed   Letter sent

## 2021-04-22 ENCOUNTER — TELEPHONE (OUTPATIENT)
Dept: SLEEP CENTER | Facility: CLINIC | Age: 56
End: 2021-04-22

## 2021-04-22 NOTE — TELEPHONE ENCOUNTER
4/22 Pt is schedule for Sleep CPAP Study on 7/13 in [facility]  Sending info about Covid test should be perform 10 days before  lc  Patient informed that COVID testing is to be performed 5-10 days prior to PAP study  COVID tests performed more than 10 days prior to the sleep study will not be accepted  Testing site information provided as well as letter with testing dates

## 2021-06-04 ENCOUNTER — TELEPHONE (OUTPATIENT)
Dept: SLEEP CENTER | Facility: CLINIC | Age: 56
End: 2021-06-04

## 2021-06-04 NOTE — TELEPHONE ENCOUNTER
----- Message from Anna Naylor MD sent at 4/6/2021  3:16 PM EDT -----  approved  ----- Message -----  From: Magdalene Correia  Sent: 9/3/2102  11:04 AM EDT  To: Sleep Medicine [facility] Provider    This sleep study needs approval      If approved please sign and return to clerical pool  If denied please include reasons why  Also provide alternative testing if warranted  Please sign and return to clerical pool

## 2021-06-23 ENCOUNTER — APPOINTMENT (OUTPATIENT)
Dept: LAB | Facility: HOSPITAL | Age: 56
End: 2021-06-23
Payer: COMMERCIAL

## 2021-06-23 DIAGNOSIS — E53.9 VITAMIN B DEFICIENCY, UNSPECIFIED: ICD-10-CM

## 2021-06-23 DIAGNOSIS — R25.2 CRAMP AND SPASM: ICD-10-CM

## 2021-06-23 DIAGNOSIS — E06.1 SUBACUTE THYROIDITIS: ICD-10-CM

## 2021-06-23 DIAGNOSIS — E78.5 HYPERLIPIDEMIA, UNSPECIFIED HYPERLIPIDEMIA TYPE: ICD-10-CM

## 2021-06-23 DIAGNOSIS — R73.09 ABNORMAL GLUCOSE: ICD-10-CM

## 2021-06-23 LAB
ALBUMIN SERPL BCP-MCNC: 3.7 G/DL (ref 3.5–5)
ALP SERPL-CCNC: 65 U/L (ref 46–116)
ALT SERPL W P-5'-P-CCNC: 31 U/L (ref 12–78)
ANION GAP SERPL CALCULATED.3IONS-SCNC: 3 MMOL/L (ref 4–13)
AST SERPL W P-5'-P-CCNC: 20 U/L (ref 5–45)
BILIRUB SERPL-MCNC: 0.61 MG/DL (ref 0.2–1)
BUN SERPL-MCNC: 14 MG/DL (ref 5–25)
CALCIUM SERPL-MCNC: 9.1 MG/DL (ref 8.3–10.1)
CHLORIDE SERPL-SCNC: 107 MMOL/L (ref 100–108)
CHOLEST SERPL-MCNC: 209 MG/DL (ref 50–200)
CK SERPL-CCNC: 147 U/L (ref 39–308)
CO2 SERPL-SCNC: 30 MMOL/L (ref 21–32)
CREAT SERPL-MCNC: 0.81 MG/DL (ref 0.6–1.3)
ERYTHROCYTE [DISTWIDTH] IN BLOOD BY AUTOMATED COUNT: 14.4 % (ref 11.6–15.1)
FERRITIN SERPL-MCNC: 15 NG/ML (ref 8–388)
FOLATE SERPL-MCNC: >20 NG/ML (ref 3.1–17.5)
GFR SERPL CREATININE-BSD FRML MDRD: 99 ML/MIN/1.73SQ M
GLUCOSE P FAST SERPL-MCNC: 101 MG/DL (ref 65–99)
HCT VFR BLD AUTO: 44.3 % (ref 36.5–49.3)
HDLC SERPL-MCNC: 46 MG/DL
HGB BLD-MCNC: 14.7 G/DL (ref 12–17)
LDLC SERPL CALC-MCNC: 138 MG/DL (ref 0–100)
MAGNESIUM SERPL-MCNC: 2.3 MG/DL (ref 1.6–2.6)
MCH RBC QN AUTO: 28.8 PG (ref 26.8–34.3)
MCHC RBC AUTO-ENTMCNC: 33.2 G/DL (ref 31.4–37.4)
MCV RBC AUTO: 87 FL (ref 82–98)
NONHDLC SERPL-MCNC: 163 MG/DL
PLATELET # BLD AUTO: 233 THOUSANDS/UL (ref 149–390)
PMV BLD AUTO: 10.2 FL (ref 8.9–12.7)
POTASSIUM SERPL-SCNC: 3.9 MMOL/L (ref 3.5–5.3)
PROT SERPL-MCNC: 7.3 G/DL (ref 6.4–8.2)
RBC # BLD AUTO: 5.11 MILLION/UL (ref 3.88–5.62)
SODIUM SERPL-SCNC: 140 MMOL/L (ref 136–145)
TRIGL SERPL-MCNC: 126 MG/DL
TSH SERPL DL<=0.05 MIU/L-ACNC: 2.41 UIU/ML (ref 0.36–3.74)
VIT B12 SERPL-MCNC: 514 PG/ML (ref 100–900)
WBC # BLD AUTO: 5.65 THOUSAND/UL (ref 4.31–10.16)

## 2021-06-23 PROCEDURE — 80061 LIPID PANEL: CPT

## 2021-06-23 PROCEDURE — 82728 ASSAY OF FERRITIN: CPT

## 2021-06-23 PROCEDURE — 83735 ASSAY OF MAGNESIUM: CPT

## 2021-06-23 PROCEDURE — 84443 ASSAY THYROID STIM HORMONE: CPT

## 2021-06-23 PROCEDURE — 82607 VITAMIN B-12: CPT

## 2021-06-23 PROCEDURE — 80053 COMPREHEN METABOLIC PANEL: CPT

## 2021-06-23 PROCEDURE — 85027 COMPLETE CBC AUTOMATED: CPT

## 2021-06-23 PROCEDURE — 82746 ASSAY OF FOLIC ACID SERUM: CPT

## 2021-06-23 PROCEDURE — 82550 ASSAY OF CK (CPK): CPT

## 2021-07-06 ENCOUNTER — TELEPHONE (OUTPATIENT)
Dept: SLEEP CENTER | Facility: CLINIC | Age: 56
End: 2021-07-06

## 2021-07-06 PROCEDURE — U0005 INFEC AGEN DETEC AMPLI PROBE: HCPCS | Performed by: INTERNAL MEDICINE

## 2021-07-06 PROCEDURE — U0003 INFECTIOUS AGENT DETECTION BY NUCLEIC ACID (DNA OR RNA); SEVERE ACUTE RESPIRATORY SYNDROME CORONAVIRUS 2 (SARS-COV-2) (CORONAVIRUS DISEASE [COVID-19]), AMPLIFIED PROBE TECHNIQUE, MAKING USE OF HIGH THROUGHPUT TECHNOLOGIES AS DESCRIBED BY CMS-2020-01-R: HCPCS | Performed by: INTERNAL MEDICINE

## 2021-07-06 NOTE — TELEPHONE ENCOUNTER
We had to cancel the patient's CPAP study due to the recall  Please advise how you would like to proceed  Would you like the patient to wait to have CPAP study, or will you be ordering auto PAP? You wrote for study for one of the other Pulm docs    Thank you

## 2021-07-07 DIAGNOSIS — G47.30 SEVERE SLEEP APNEA: Primary | ICD-10-CM

## 2021-07-20 ENCOUNTER — OFFICE VISIT (OUTPATIENT)
Dept: PULMONOLOGY | Facility: CLINIC | Age: 56
End: 2021-07-20
Payer: COMMERCIAL

## 2021-07-20 VITALS
SYSTOLIC BLOOD PRESSURE: 124 MMHG | HEART RATE: 78 BPM | OXYGEN SATURATION: 96 % | BODY MASS INDEX: 32.8 KG/M2 | HEIGHT: 67 IN | DIASTOLIC BLOOD PRESSURE: 78 MMHG | WEIGHT: 209 LBS | TEMPERATURE: 97.3 F

## 2021-07-20 DIAGNOSIS — U07.1 COVID-19 VIRUS INFECTION: ICD-10-CM

## 2021-07-20 DIAGNOSIS — E66.9 OBESITY, UNSPECIFIED CLASSIFICATION, UNSPECIFIED OBESITY TYPE, UNSPECIFIED WHETHER SERIOUS COMORBIDITY PRESENT: ICD-10-CM

## 2021-07-20 DIAGNOSIS — J30.89 SEASONAL ALLERGIC RHINITIS DUE TO OTHER ALLERGIC TRIGGER: Primary | ICD-10-CM

## 2021-07-20 DIAGNOSIS — G47.30 SEVERE SLEEP APNEA: ICD-10-CM

## 2021-07-20 PROCEDURE — 99214 OFFICE O/P EST MOD 30 MIN: CPT | Performed by: INTERNAL MEDICINE

## 2021-07-20 NOTE — PROGRESS NOTES
Pulmonary Follow Up Note   Hu Fox 60 y o  male MRN: [id number]  7/20/2021      Assessment/Plan:    COVID 23 virus infection   Resolved  Asymptomatic today, reports no MOONEY recently  Will continue to monitor    Severe sleep apnea  Pt waiting for CPAP machine will get it in 7 to 10 days    Allergic rhinitis   Stable  Continue flonase and Singulair      Return in about 6 months (around 1/20/2022)  History of Present Illness   HPI:  Domenico Carlin is a 60 y o  male who is here for follow up  Pt denies any MOONEY, recent worsening of SOB, chest pain, cough , fever  Reports allergies are well under control on Flonase and Singulair  Pt to follow up in 6 months      Review of Systems   Constitutional: Negative for chills and fever  HENT: Negative for congestion and sore throat  Respiratory: Negative for cough and shortness of breath  Cardiovascular: Negative for chest pain  Gastrointestinal: Negative for abdominal pain  Musculoskeletal: Negative for back pain and neck pain  Skin: Negative for rash  Neurological: Negative for dizziness, weakness and headaches  Psychiatric/Behavioral: Negative for agitation and behavioral problems         Historical Information   Past Medical History:   Diagnosis Date    Anxiety     Arthritis     Depression     Diabetes mellitus (HCC)     NIDDM    Hyperlipidemia     Lumbar disc disorder     left leg and foot  numbness    Meniscus tear     Other specified joint disorders, right knee     Seasonal allergies     Trigger finger, left index finger     and right     Past Surgical History:   Procedure Laterality Date    ARTHROSCOPY KNEE Left 2/1/2016    Procedure: ARTHROSCOPY KNEE;  Surgeon: Savage Boateng MD;  Location:  MAIN OR;  Service:     ARTHROSCOPY KNEE Right 12/21/2017    Procedure: KNEE ARTHROSCOPY; DECOMPRESSION OF ACL CYST, PARTIAL MEDIAL MENISECTOMY;  Surgeon: Mikayla Snyder MD;  [facility];  Service: Orthopedics    FRACTURE SURGERY Right     leg    HERNIA REPAIR      umbilical     OK COLONOSCOPY FLX DX W/COLLJ SPEC WHEN PFRMD N/A 8/18/2016    Procedure: COLONOSCOPY;  Surgeon: Gabriela Agudelo MD;  [facility]: [facility];   Service: Gastroenterology    OK INCISE FINGER TENDON SHEATH Left 2/6/2018    Procedure: INDEX FINGER TRIGGER RELEASE; LEFT LONG, RING, and SMALL FINGER INJECTIONS;  Surgeon: Darcy Cabrera MD;  [facility];  Service: Orthopedics    OK KNEE SCOPE,MED/LAT MENISECTOMY Left 2/1/2016    Procedure: PARTIAL MENISCECTOMY MEDIAL, SYNOCECTOMY, CHONDROPLASTY;  Surgeon: Sal Rust MD;  [facility];  Service: Orthopedics     Family History   Problem Relation Age of Onset    Diabetes Mother     Kidney disease Mother     Diabetes Father      Social History     Tobacco Use   Smoking Status Never Smoker   Smokeless Tobacco Never Used       Meds/Allergies     Current Outpatient Medications:     albuterol (2 5 mg/3 mL) 0 083 % nebulizer solution, Take 2 5 mg by nebulization every 6 (six) hours as needed for wheezing or shortness of breath, Disp: , Rfl:     albuterol (PROVENTIL HFA,VENTOLIN HFA) 90 mcg/act inhaler, Inhale 2 puffs every 6 (six) hours as needed for wheezing, Disp: , Rfl:     calcium carbonate-vitamin D (OSCAL-D) 500 mg-200 units per tablet, Take 1 tablet by mouth 2 (two) times a day with meals, Disp: , Rfl:     erythromycin (ILOTYCIN) ophthalmic ointment, APPLY IN LEFT EYE AT BEDTIME, Disp: , Rfl: 0    fluticasone (FLONASE) 50 mcg/act nasal spray, 2 sprays into each nostril daily, Disp: 1 Bottle, Rfl: 5    montelukast (SINGULAIR) 10 mg tablet, Take 1 tablet (10 mg total) by mouth daily at bedtime, Disp: 30 tablet, Rfl: 5    neomycin-bacitracin-polymyxin (NEOSPORIN) ophthalmic ointment, Administer 0 5 inches into the left eye 4 (four) times a day, Disp: 3 5 g, Rfl: 0    amoxicillin-clavulanate (AUGMENTIN) 500-125 mg per tablet, Take 1 tablet by mouth every 12 (twelve) hours (Patient not taking: Reported on 7/20/2021), Disp: , Rfl: 0    atorvastatin (LIPITOR) 40 mg tablet, Take 1 tablet (40 mg total) by mouth daily (Patient not taking: Reported on 4/5/2021), Disp: 30 tablet, Rfl: 5    fexofenadine (ALLEGRA ODT) 30 MG disintegrating tablet, Take 1 tablet (30 mg total) by mouth daily (Patient not taking: Reported on 7/20/2021), Disp: 30 tablet, Rfl: 5    rosuvastatin (CRESTOR) 5 mg tablet, TAKE 1 TABLET (5 MG) BY ORAL ROUTE ONCE DAILY IN THE EVENING FOR CHOLESTEROL (Patient not taking: Reported on 7/20/2021), Disp: , Rfl: 5    triamcinolone (KENALOG) 0 1 % oral topical paste, PLACE A SMALL AMOUNT TO THE AFFECTED AREA ON TONGUE AND MOUTH 2-3 TIMES DAILY AFTER MEALS (Patient not taking: Reported on 7/20/2021), Disp: , Rfl: 0  Allergies   Allergen Reactions    Other Other (See Comments)     Seasonal - nasal congestion  MONOCRYL - SUTURE       Vitals: Blood pressure 124/78, pulse 78, temperature (!) 97 3 °F (36 3 °C), temperature source Tympanic, height 5' 7" (1 702 m), weight 94 8 kg (209 lb), SpO2 96 %  Body mass index is 32 73 kg/m²  Oxygen Therapy  SpO2: 96 %  Oxygen Therapy: None (Room air)      Physical Exam  Physical Exam  Vitals and nursing note reviewed  Constitutional:       Appearance: He is well-developed  HENT:      Head: Normocephalic and atraumatic  Eyes:      Conjunctiva/sclera: Conjunctivae normal    Cardiovascular:      Rate and Rhythm: Normal rate and regular rhythm  Heart sounds: No murmur heard  Pulmonary:      Effort: Pulmonary effort is normal  No respiratory distress  Breath sounds: Normal breath sounds  Abdominal:      General: Bowel sounds are normal       Palpations: Abdomen is soft  Tenderness: There is no abdominal tenderness  Musculoskeletal:      Cervical back: Neck supple  Skin:     General: Skin is warm and dry  Neurological:      Mental Status: He is alert  Cranial Nerves: No cranial nerve deficit        Motor: No weakness  Psychiatric:         Mood and Affect: Mood normal          Behavior: Behavior normal          Labs: ;  Lab Results   Component Value Date    WBC 5 65 06/23/2021    HGB 14 7 06/23/2021    HCT 44 3 06/23/2021    MCV 87 06/23/2021     06/23/2021     Lab Results   Component Value Date    GLUCOSE 109 08/03/2015    CALCIUM 9 1 06/23/2021     11/06/2017    K 3 9 06/23/2021    CO2 30 06/23/2021     06/23/2021    BUN 14 06/23/2021    CREATININE 0 81 06/23/2021     No results found for: IGE  Lab Results   Component Value Date    ALT 31 06/23/2021    AST 20 06/23/2021    ALKPHOS 65 06/23/2021    BILITOT 0 5 05/09/2017           Imaging and other studies: I have personally reviewed pertinent reports        Pulmonary function testing:     EKG, Pathology, and Other Studies: none      Je Gonzalez MD

## 2021-09-02 ENCOUNTER — OFFICE VISIT (OUTPATIENT)
Dept: GASTROENTEROLOGY | Facility: CLINIC | Age: 56
End: 2021-09-02
Payer: COMMERCIAL

## 2021-09-02 VITALS
DIASTOLIC BLOOD PRESSURE: 76 MMHG | SYSTOLIC BLOOD PRESSURE: 107 MMHG | HEART RATE: 88 BPM | WEIGHT: 208.8 LBS | HEIGHT: 67 IN | TEMPERATURE: 96.7 F | BODY MASS INDEX: 32.77 KG/M2

## 2021-09-02 DIAGNOSIS — Z86.010 PERSONAL HISTORY OF COLONIC POLYPS: Primary | ICD-10-CM

## 2021-09-02 PROBLEM — Z86.0100 PERSONAL HISTORY OF COLONIC POLYPS: Status: ACTIVE | Noted: 2021-09-02

## 2021-09-02 PROCEDURE — 99241 PR OFFICE CONSULTATION NEW/ESTAB PATIENT 15 MIN: CPT | Performed by: PHYSICIAN ASSISTANT

## 2021-09-02 RX ORDER — BUDESONIDE AND FORMOTEROL FUMARATE DIHYDRATE 160; 4.5 UG/1; UG/1
AEROSOL RESPIRATORY (INHALATION)
COMMUNITY
Start: 2021-08-22

## 2021-09-02 RX ORDER — LEVOCETIRIZINE DIHYDROCHLORIDE 5 MG/1
TABLET, FILM COATED ORAL
COMMUNITY
Start: 2021-08-22

## 2021-09-02 RX ORDER — CYANOCOBALAMIN 1000 UG/ML
INJECTION INTRAMUSCULAR; SUBCUTANEOUS
COMMUNITY
Start: 2021-06-22

## 2021-09-02 RX ORDER — KETOTIFEN FUMARATE 0.35 MG/ML
SOLUTION/ DROPS OPHTHALMIC
COMMUNITY

## 2021-09-02 RX ORDER — ASCORBIC ACID 500 MG
TABLET ORAL
COMMUNITY

## 2021-09-02 NOTE — PROGRESS NOTES
[facility] - Outpatient Consultation  Navid Rivas 60 y o  male MRN: [id number]  Encounter: [id number]        Assessment and Plan    1  Personal history of colon polyps   The patient underwent a colonoscopy 8/18/16 with Dr Deborah Webster with 2 hyperplastic polyps were removed, internal and external hemorrhoids as well as mild diverticulosis in the sigmoid and ascending colon  Repeat colonoscopy was recommended in another 10 years (initial recommendation was 5 years however after pathology came back with hyperplastic polyps recommendation was changed to 10 years; see pathology report documentation from 8/26/16)  He has no family history of colon cancer  Denies any GI complaints or alarm symptoms  - patient not due for colonoscopy until 8/2026, will place colon recall for this time    FU 2026 for colonoscopy     ______________________________________________________________________    History of Present Illness  Jennifer Aldrich is a 60 y o  male here for consultation of colon cancer screening  The patient underwent a colonoscopy 8/18/16 with Dr Deborah Webster and 2 hyperplastic polyps were removed with recommendation to repeat colonoscopy in another 10 years (initial recommendation was 5 years however after pathology came back with hyperplastic polyps recommendation was changed to 10 years - see pathology report documentation from 8/26/16)  Colonoscopy did also revealed internal and external hemorrhoids as well as mild diverticulosis in the sigmoid and ascending colon  He has no family history of colon cancer  Denies any GI complaints or alarm symptoms  Review of Systems   Constitutional: Negative for appetite change, chills, fatigue, fever and unexpected weight change  HENT: Negative for sore throat and trouble swallowing  Respiratory: Negative for cough and choking      Gastrointestinal: Negative for abdominal distention, abdominal pain, anal bleeding, blood in stool, constipation, diarrhea, nausea, rectal pain and vomiting  Musculoskeletal: Negative for gait problem  Psychiatric/Behavioral: Negative for behavioral problems and confusion  Past Medical History  Past Medical History:   Diagnosis Date    Anxiety     Arthritis     Depression     Diabetes mellitus (HCC)     NIDDM    Hyperlipidemia     Lumbar disc disorder     left leg and foot  numbness    Meniscus tear     Other specified joint disorders, right knee     Seasonal allergies     Trigger finger, left index finger     and right       Past Social history  Past Surgical History:   Procedure Laterality Date    ARTHROSCOPY KNEE Left 2/1/2016    Procedure: ARTHROSCOPY KNEE;  Surgeon: Celeste Torres MD;  [facility];  Service:     ARTHROSCOPY KNEE Right 12/21/2017    Procedure: KNEE ARTHROSCOPY; DECOMPRESSION OF ACL CYST, PARTIAL MEDIAL MENISECTOMY;  Surgeon: Orville Peters MD;  [facility];  Service: Orthopedics    COLONOSCOPY  2016    FRACTURE SURGERY Right     leg    HERNIA REPAIR      umbilical     OR COLONOSCOPY FLX DX W/COLLJ SPEC WHEN PFRMD N/A 8/18/2016    Procedure: COLONOSCOPY;  Surgeon: Trinh Hale MD;  [facility];   Service: Gastroenterology    OR INCISE FINGER TENDON SHEATH Left 2/6/2018    Procedure: INDEX FINGER TRIGGER RELEASE; LEFT LONG, RING, and SMALL FINGER INJECTIONS;  Surgeon: Amy Urena MD;  [facility];  Service: Orthopedics    OR KNEE SCOPE,MED/LAT MENISECTOMY Left 2/1/2016    Procedure: PARTIAL MENISCECTOMY MEDIAL, SYNOCECTOMY, CHONDROPLASTY;  Surgeon: Celeste Torres MD;  [facility];  Service: Orthopedics     Social History     Socioeconomic History    Marital status: /Civil Union     Spouse name: Not on file    Number of children: Not on file    Years of education: Not on file    Highest education level: Not on file   Occupational History    Occupation: [employer]   Tobacco Use    Smoking status: Never Smoker    Smokeless tobacco: Never Used   Vaping Use    Vaping Use: Never used   Substance and Sexual Activity    Alcohol use: Yes     Comment: few x year    Drug use: No    Sexual activity: Never   Other Topics Concern    Not on file   Social History Narrative    Not on file     Social Determinants of Health     Financial Resource Strain:     Difficulty of Paying Living Expenses:    Food Insecurity:     Worried About Running Out of Food in the Last Year:     [address] in the Last Year:    Transportation Needs:     Lack of Transportation (Medical):      Lack of Transportation (Non-Medical):    Physical Activity:     Days of Exercise per Week:     Minutes of Exercise per Session:    Stress:     Feeling of Stress :    Social Connections:     Frequency of Communication with Friends and Family:     Frequency of Social Gatherings with Friends and Family:     Attends Taoism Services:     Active Member of Clubs or Organizations:     Attends Club or Organization Meetings:     Marital Status:    Intimate Partner Violence:     Fear of Current or Ex-Partner:     Emotionally Abused:     Physically Abused:     Sexually Abused:      Social History     Substance and Sexual Activity   Alcohol Use Yes    Comment: few x year     Social History     Substance and Sexual Activity   Drug Use No     Social History     Tobacco Use   Smoking Status Never Smoker   Smokeless Tobacco Never Used       Past Family History  Family History   Problem Relation Age of Onset    Diabetes Mother     Kidney disease Mother     Diabetes Father        Current Medications  Current Outpatient Medications   Medication Sig Dispense Refill    albuterol (2 5 mg/3 mL) 0 083 % nebulizer solution Take 2 5 mg by nebulization every 6 (six) hours as needed for wheezing or shortness of breath      albuterol (PROVENTIL HFA,VENTOLIN HFA) 90 mcg/act inhaler Inhale 2 puffs every 6 (six) hours as needed for wheezing      ascorbic acid (VITAMIN C) 500 MG tablet TAKE 1 TABLET TWICE DAILY      calcium carbonate-vitamin D (OSCAL-D) 500 mg-200 units per tablet Take 1 tablet by mouth 2 (two) times a day with meals      cyanocobalamin 1,000 mcg/mL INJECT 1 ML (1,000 MCG) BY SUBCUTANEOUS ROUTE ONCE A WEEK FOR 1 MONTH THEN ONCE EVERY 4 WEEKS      erythromycin (ILOTYCIN) ophthalmic ointment APPLY IN LEFT EYE AT BEDTIME  0    fluticasone (FLONASE) 50 mcg/act nasal spray 2 sprays into each nostril daily 1 Bottle 5    ketotifen ([place]) 0 025 % ophthalmic solution       levocetirizine (XYZAL) 5 MG tablet TAKE 1 TABLET (5 MG) BY ORAL ROUTE ONCE DAILY IN THE EVENING FOR ALLERGIES      montelukast (SINGULAIR) 10 mg tablet Take 1 tablet (10 mg total) by mouth daily at bedtime 30 tablet 5    neomycin-bacitracin-polymyxin (NEOSPORIN) ophthalmic ointment Administer 0 5 inches into the left eye 4 (four) times a day 3 5 g 0    Symbicort 160-4 5 MCG/ACT inhaler INHALE 2 PUFFS TWICE A DAY BY INHALATION ROUTE   amoxicillin-clavulanate (AUGMENTIN) 500-125 mg per tablet Take 1 tablet by mouth every 12 (twelve) hours (Patient not taking: Reported on 7/20/2021)  0    atorvastatin (LIPITOR) 40 mg tablet Take 1 tablet (40 mg total) by mouth daily (Patient not taking: Reported on 4/5/2021) 30 tablet 5    fexofenadine (ALLEGRA ODT) 30 MG disintegrating tablet Take 1 tablet (30 mg total) by mouth daily (Patient not taking: Reported on 7/20/2021) 30 tablet 5    rosuvastatin (CRESTOR) 5 mg tablet TAKE 1 TABLET (5 MG) BY ORAL ROUTE ONCE DAILY IN THE EVENING FOR CHOLESTEROL (Patient not taking: Reported on 7/20/2021)  5    triamcinolone (KENALOG) 0 1 % oral topical paste PLACE A SMALL AMOUNT TO THE AFFECTED AREA ON TONGUE AND MOUTH 2-3 TIMES DAILY AFTER MEALS (Patient not taking: Reported on 7/20/2021)  0     No current facility-administered medications for this visit         Allergies  Allergies   Allergen Reactions    Other Other (See Comments)     Seasonal - nasal congestion  MONOCRYL - SUTURE         The following portions of the patient's history were reviewed and updated as appropriate: allergies, current medications, past medical history, past social history, past surgical history and problem list       Vitals  Vitals:    09/02/21 1450   BP: 107/76   Pulse: 88   Temp: (!) 96 7 °F (35 9 °C)   Weight: 94 7 kg (208 lb 12 8 oz)   Height: 5' 7" (1 702 m)         Physical Exam  Constitutional   General appearance: Patient is seated and in no acute distress, well appearing and well nourished  Head and Face   Head and face: Normal     Eyes   Conjunctiva and lids: No erythema, swelling or discharge  Anicteric  Ears, Nose, Mouth, and Throat   Hearing: Normal     Neck: Supple, trachea midline  Pulmonary   Respiratory effort: No increased work of breathing or signs of respiratory distress  Lungs: Clear to ascultation, no wheezes, rhonchi, or rales  Cardiovascular   Heart: Regular rate and rhythm, no murmurs gallops or rubs   Examination of extremities for edema and/or varicosities: Normal     Abdomen   Abdomen: Soft, non-tender, no masses, no organomegaly  Normal bowel sounds  Musculoskeletal   Gait and station: Normal     Skin   Skin and subcutaneous tissue: Warm, dry, and intact  No visible jaundice, lesions or rashes  Psychiatric   Judgment and insight: Normal  Recent and remote memory:  Normal  Mood and affect: Normal      Results  No visits with results within 1 Day(s) from this visit  Latest known visit with results is:   Orders Only on 07/06/2021   Component Date Value    SARS-CoV-2 07/06/2021 Negative        Radiology Results  No results found  Orders  No orders of the defined types were placed in this encounter

## 2021-09-16 ENCOUNTER — TELEPHONE (OUTPATIENT)
Dept: PULMONOLOGY | Facility: CLINIC | Age: 56
End: 2021-09-16

## 2021-09-16 NOTE — TELEPHONE ENCOUNTER
Patient calling stating he feels his cpap pressure is too high  He uses [facility]   Please advise [phone]

## 2021-09-16 NOTE — TELEPHONE ENCOUNTER
His appt in September was cancelled because it was moved up to July  In that note Dr Bender wanted to see him back in January  Would you like him to be seen sooner than January to discuss concerns or would you like the MA to pull his compliance to assess pressures?

## 2021-09-16 NOTE — TELEPHONE ENCOUNTER
Looks like he was supposed to see is the beginning of September however he canceled his appointment  He has not seen Dr Adriana murdock since July  It looks like at that time he was new to CPAP  He will need a compliance follow-up appointment  At this time I can Address his CPAP complaints  I cannot do this over the phone

## 2021-09-24 NOTE — TELEPHONE ENCOUNTER
There are no compliance appts until November Barrie Click can you please pull compliance for this patient so we can see what his AHI is

## 2021-09-27 NOTE — TELEPHONE ENCOUNTER
Compliance is poor he only wore for 8 days and only about 1 hour a night  I do see that after his initial sleep study there was an order for CPAP titration study  I did call him today to recommend that he undergo a titration study for best evaluation of his pressures required for his moderate sleep apnea  He is agreeable  Please help coordinate this for him as an outpatient

## 2021-09-27 NOTE — TELEPHONE ENCOUNTER
Called and gave pt the number for central scheduling to set up CPAP study  He will call, no questions

## 2021-11-09 ENCOUNTER — OFFICE VISIT (OUTPATIENT)
Dept: PULMONOLOGY | Facility: CLINIC | Age: 56
End: 2021-11-09
Payer: COMMERCIAL

## 2021-11-09 VITALS
BODY MASS INDEX: 32.96 KG/M2 | TEMPERATURE: 97.8 F | DIASTOLIC BLOOD PRESSURE: 80 MMHG | OXYGEN SATURATION: 97 % | SYSTOLIC BLOOD PRESSURE: 116 MMHG | WEIGHT: 210 LBS | HEIGHT: 67 IN | HEART RATE: 79 BPM

## 2021-11-09 DIAGNOSIS — G47.30 SEVERE SLEEP APNEA: Primary | ICD-10-CM

## 2021-11-09 DIAGNOSIS — J30.89 SEASONAL ALLERGIC RHINITIS DUE TO OTHER ALLERGIC TRIGGER: ICD-10-CM

## 2021-11-09 PROCEDURE — 99214 OFFICE O/P EST MOD 30 MIN: CPT | Performed by: INTERNAL MEDICINE

## 2021-11-09 RX ORDER — OLOPATADINE HYDROCHLORIDE 1 MG/ML
SOLUTION/ DROPS OPHTHALMIC
COMMUNITY
Start: 2021-10-17

## 2022-05-23 ENCOUNTER — APPOINTMENT (OUTPATIENT)
Dept: LAB | Facility: HOSPITAL | Age: 57
End: 2022-05-23
Payer: COMMERCIAL

## 2022-05-23 DIAGNOSIS — Z13.1 SCREENING FOR DIABETES MELLITUS: ICD-10-CM

## 2022-05-23 DIAGNOSIS — E78.5 HYPERLIPIDEMIA, UNSPECIFIED HYPERLIPIDEMIA TYPE: ICD-10-CM

## 2022-05-23 DIAGNOSIS — E53.9 VITAMIN B DEFICIENCY, UNSPECIFIED: ICD-10-CM

## 2022-05-23 LAB
ALBUMIN SERPL BCP-MCNC: 3.4 G/DL (ref 3.5–5)
ALP SERPL-CCNC: 69 U/L (ref 46–116)
ALT SERPL W P-5'-P-CCNC: 33 U/L (ref 12–78)
ANION GAP SERPL CALCULATED.3IONS-SCNC: 1 MMOL/L (ref 4–13)
AST SERPL W P-5'-P-CCNC: 19 U/L (ref 5–45)
BASOPHILS # BLD AUTO: 0.07 THOUSANDS/ΜL (ref 0–0.1)
BASOPHILS NFR BLD AUTO: 1 % (ref 0–1)
BILIRUB SERPL-MCNC: 0.61 MG/DL (ref 0.2–1)
BUN SERPL-MCNC: 17 MG/DL (ref 5–25)
CALCIUM ALBUM COR SERPL-MCNC: 9.7 MG/DL (ref 8.3–10.1)
CALCIUM SERPL-MCNC: 9.2 MG/DL (ref 8.3–10.1)
CHLORIDE SERPL-SCNC: 108 MMOL/L (ref 100–108)
CHOLEST SERPL-MCNC: 201 MG/DL
CO2 SERPL-SCNC: 30 MMOL/L (ref 21–32)
CREAT SERPL-MCNC: 0.9 MG/DL (ref 0.6–1.3)
EOSINOPHIL # BLD AUTO: 0.2 THOUSAND/ΜL (ref 0–0.61)
EOSINOPHIL NFR BLD AUTO: 4 % (ref 0–6)
ERYTHROCYTE [DISTWIDTH] IN BLOOD BY AUTOMATED COUNT: 14.5 % (ref 11.6–15.1)
EST. AVERAGE GLUCOSE BLD GHB EST-MCNC: 137 MG/DL
FOLATE SERPL-MCNC: >20 NG/ML (ref 3.1–17.5)
GFR SERPL CREATININE-BSD FRML MDRD: 93 ML/MIN/1.73SQ M
GLUCOSE P FAST SERPL-MCNC: 120 MG/DL (ref 65–99)
HBA1C MFR BLD: 6.4 %
HCT VFR BLD AUTO: 42.4 % (ref 36.5–49.3)
HDLC SERPL-MCNC: 43 MG/DL
HGB BLD-MCNC: 14.3 G/DL (ref 12–17)
IMM GRANULOCYTES # BLD AUTO: 0.01 THOUSAND/UL (ref 0–0.2)
IMM GRANULOCYTES NFR BLD AUTO: 0 % (ref 0–2)
LDLC SERPL CALC-MCNC: 135 MG/DL (ref 0–100)
LYMPHOCYTES # BLD AUTO: 2.2 THOUSANDS/ΜL (ref 0.6–4.47)
LYMPHOCYTES NFR BLD AUTO: 39 % (ref 14–44)
MCH RBC QN AUTO: 29.1 PG (ref 26.8–34.3)
MCHC RBC AUTO-ENTMCNC: 33.7 G/DL (ref 31.4–37.4)
MCV RBC AUTO: 86 FL (ref 82–98)
MONOCYTES # BLD AUTO: 0.51 THOUSAND/ΜL (ref 0.17–1.22)
MONOCYTES NFR BLD AUTO: 9 % (ref 4–12)
NEUTROPHILS # BLD AUTO: 2.69 THOUSANDS/ΜL (ref 1.85–7.62)
NEUTS SEG NFR BLD AUTO: 47 % (ref 43–75)
NONHDLC SERPL-MCNC: 158 MG/DL
NRBC BLD AUTO-RTO: 0 /100 WBCS
PLATELET # BLD AUTO: 220 THOUSANDS/UL (ref 149–390)
PMV BLD AUTO: 9.5 FL (ref 8.9–12.7)
POTASSIUM SERPL-SCNC: 4.2 MMOL/L (ref 3.5–5.3)
PROT SERPL-MCNC: 7 G/DL (ref 6.4–8.2)
RBC # BLD AUTO: 4.91 MILLION/UL (ref 3.88–5.62)
SODIUM SERPL-SCNC: 139 MMOL/L (ref 136–145)
TRIGL SERPL-MCNC: 113 MG/DL
VIT B12 SERPL-MCNC: 149 PG/ML (ref 100–900)
WBC # BLD AUTO: 5.68 THOUSAND/UL (ref 4.31–10.16)

## 2022-05-23 PROCEDURE — 85025 COMPLETE CBC W/AUTO DIFF WBC: CPT

## 2022-05-23 PROCEDURE — 80053 COMPREHEN METABOLIC PANEL: CPT

## 2022-05-23 PROCEDURE — 82607 VITAMIN B-12: CPT

## 2022-05-23 PROCEDURE — 83036 HEMOGLOBIN GLYCOSYLATED A1C: CPT

## 2022-05-23 PROCEDURE — 83918 ORGANIC ACIDS TOTAL QUANT: CPT

## 2022-05-23 PROCEDURE — 36415 COLL VENOUS BLD VENIPUNCTURE: CPT

## 2022-05-23 PROCEDURE — 80061 LIPID PANEL: CPT

## 2022-05-23 PROCEDURE — 82746 ASSAY OF FOLIC ACID SERUM: CPT

## 2022-05-28 LAB — METHYLMALONATE SERPL-SCNC: 862 NMOL/L (ref 0–378)

## 2022-06-02 ENCOUNTER — OFFICE VISIT (OUTPATIENT)
Dept: DENTISTRY | Facility: CLINIC | Age: 57
End: 2022-06-02

## 2022-06-02 VITALS — TEMPERATURE: 98 F | DIASTOLIC BLOOD PRESSURE: 74 MMHG | SYSTOLIC BLOOD PRESSURE: 112 MMHG | HEART RATE: 78 BPM

## 2022-06-02 DIAGNOSIS — Z01.20 ENCOUNTER FOR DENTAL EXAMINATION: Primary | ICD-10-CM

## 2022-06-02 PROCEDURE — D0150 COMPREHENSIVE ORAL EVALUATION - NEW OR ESTABLISHED PATIENT: HCPCS

## 2022-06-02 PROCEDURE — D0210 INTRAORAL - COMPLETE SERIES OF RADIOGRAPHIC IMAGES: HCPCS

## 2022-06-02 NOTE — PROGRESS NOTES
COMP/FMX    Reviewed meds/hhx in Epic  ASA II  Takes allergy meds and inhalers as needed  CC: 0 pain  Last cleaning within the past year at outside dds  FMX taken  FM Perio Charting reveals loc 4-5mm pocketing around 3rd molars  Mod gen recession/facial abrasion area's    Gen attrition w/ UA chipped edges  Pt states he does not think he grinds or clenches his teeth  Discussed he exhibits classic signs of bruxism  Dr Nadia Soto Ex:  18M watch  19D watch  25DI becca  30D watch  32 M watch  #11,12,29 Class V F becca  Needed due to abfraction/recession  Ad Prophy recommended    1)Ad Pro 60 mins  2)#25DI  60 mins  3)#11,12,29 V F restorations

## 2022-06-08 ENCOUNTER — OFFICE VISIT (OUTPATIENT)
Dept: DENTISTRY | Facility: CLINIC | Age: 57
End: 2022-06-08

## 2022-06-08 VITALS — DIASTOLIC BLOOD PRESSURE: 75 MMHG | HEART RATE: 74 BPM | SYSTOLIC BLOOD PRESSURE: 115 MMHG | TEMPERATURE: 97.7 F

## 2022-06-08 VITALS — HEART RATE: 79 BPM | SYSTOLIC BLOOD PRESSURE: 109 MMHG | DIASTOLIC BLOOD PRESSURE: 71 MMHG | TEMPERATURE: 96.9 F

## 2022-06-08 DIAGNOSIS — Z01.21 ENCOUNTER FOR DENTAL EXAMINATION AND CLEANING WITH ABNORMAL FINDINGS: Primary | ICD-10-CM

## 2022-06-08 DIAGNOSIS — Z00.00 ENCOUNTER FOR SCREENING AND PREVENTATIVE CARE: Primary | ICD-10-CM

## 2022-06-08 PROCEDURE — D1110 PROPHYLAXIS - ADULT: HCPCS

## 2022-06-08 NOTE — PROGRESS NOTES
Reviewed Medical History with pt  ASA:II  Chief Complaint:none     Adult Prophy  Intraoral exam:nf  Oral Hygiene:moderate generalized plaque, heavy interproximal calculus lower anteriors, lt  local  bleeding  Hand scaled, Flossed, polished  Patient tolerated well  Recommended OB electric tb  Pt  Will look into getting one  NV:rests  Needs rests  #29,25,11,12  Needs:6mos per ex, pro 50 mins  Ky Exon [facility] , PHDHP

## 2022-07-07 ENCOUNTER — OFFICE VISIT (OUTPATIENT)
Dept: DENTISTRY | Facility: CLINIC | Age: 57
End: 2022-07-07

## 2022-07-07 VITALS — TEMPERATURE: 98.2 F | SYSTOLIC BLOOD PRESSURE: 114 MMHG | DIASTOLIC BLOOD PRESSURE: 73 MMHG | HEART RATE: 75 BPM

## 2022-07-07 DIAGNOSIS — K02.9 DENTAL CARIES: Primary | ICD-10-CM

## 2022-07-07 PROCEDURE — D2391 RESIN-BASED COMPOSITE - 1 SURFACE, POSTERIOR: HCPCS | Performed by: DENTIST

## 2022-07-08 NOTE — PROGRESS NOTES
Composite Filling    MEDICAL [facility] Fly presents for composite filling  PMH reviewed, no changes  Discussed with patient need for RCT if pulp exposure occurs or in future if pulp is inflamed  Pt understands and consents  Applied topical benzocaine, administered carps 2% lido 1:100k epi via and carps 4% articaine 1:100k epi via     Applied topical benzocaine, administered 1 carps 2% lido 1:100k epi via TIFF block and long buccal nerve  Prepped teeth #29 class V with larissa bur on high speed  Bevel was created  Isolated teeth with size 0 cords and with cotton rolls and dri-angles     Etch with 37% H2PO4, rinse, dry  Applied Adhese with 20 second scrub once, gentle air dry and light cured for 10s  Restored with Tetric bulk aleah shade A3 and light cured      Refined with finishing burs, polished with enhance point  Pt left satisfied

## 2022-07-18 ENCOUNTER — OFFICE VISIT (OUTPATIENT)
Dept: DENTISTRY | Facility: CLINIC | Age: 57
End: 2022-07-18

## 2022-07-18 VITALS — DIASTOLIC BLOOD PRESSURE: 72 MMHG | TEMPERATURE: 97.8 F | SYSTOLIC BLOOD PRESSURE: 123 MMHG | HEART RATE: 80 BPM

## 2022-07-18 DIAGNOSIS — K03.1: Primary | ICD-10-CM

## 2022-07-18 PROCEDURE — D2391 RESIN-BASED COMPOSITE - 1 SURFACE, POSTERIOR: HCPCS | Performed by: DENTIST

## 2022-07-18 NOTE — PROGRESS NOTES
Composite Filling    MEDICAL CENTER [facility] Bryanna Armendariz presents for composite filling  PMH reviewed, no changes  Teeth #11-B and #12-B previously done and sound, tooth #25-DI sound  Taken off treatment plan  Tooth #13-B and #14-B composite completed  Applied topical benzocaine, administered 1 carps 4% articaine 1:100k epi via max infiltration     Prepped tooth #13-B and #14-B with round carbide on slow speed  Isolation with cotton rolls and high speed suction     Etch with 37% H2PO4, rinse, dry  Applied Adhese with 20 second scrub once, gentle air dry and light cured for 10s  Restored with Tetric bulk aleah shade A3 and light cured  Refined with finishing burs, polished with enhance point  Pt left satisfied      NV: recall prophy

## 2022-07-25 ENCOUNTER — APPOINTMENT (OUTPATIENT)
Dept: LAB | Facility: HOSPITAL | Age: 57
End: 2022-07-25
Payer: COMMERCIAL

## 2022-07-25 DIAGNOSIS — E53.8 VITAMIN B12 DEFICIENCY: ICD-10-CM

## 2022-07-25 DIAGNOSIS — R53.83 OTHER FATIGUE: ICD-10-CM

## 2022-07-25 LAB
TESTOST SERPL-MCNC: 413 NG/DL (ref 95–948)
VIT B12 SERPL-MCNC: 481 PG/ML (ref 100–900)

## 2022-07-25 PROCEDURE — 36415 COLL VENOUS BLD VENIPUNCTURE: CPT

## 2022-07-25 PROCEDURE — 84403 ASSAY OF TOTAL TESTOSTERONE: CPT

## 2022-07-25 PROCEDURE — 83918 ORGANIC ACIDS TOTAL QUANT: CPT

## 2022-07-25 PROCEDURE — 82607 VITAMIN B-12: CPT

## 2022-07-28 LAB — METHYLMALONATE SERPL-SCNC: 85 NMOL/L (ref 0–378)

## 2022-10-10 ENCOUNTER — APPOINTMENT (OUTPATIENT)
Dept: LAB | Facility: CLINIC | Age: 57
End: 2022-10-10
Payer: COMMERCIAL

## 2022-10-10 DIAGNOSIS — Z00.00 ROUTINE GENERAL MEDICAL EXAMINATION AT A HEALTH CARE FACILITY: ICD-10-CM

## 2022-10-10 DIAGNOSIS — E53.9 VITAMIN B DEFICIENCY: ICD-10-CM

## 2022-10-10 LAB
ERYTHROCYTE [DISTWIDTH] IN BLOOD BY AUTOMATED COUNT: 13.8 % (ref 11.6–15.1)
HCT VFR BLD AUTO: 45 % (ref 36.5–49.3)
HCV AB SER QL: NORMAL
HGB BLD-MCNC: 14.8 G/DL (ref 12–17)
MCH RBC QN AUTO: 28.4 PG (ref 26.8–34.3)
MCHC RBC AUTO-ENTMCNC: 32.9 G/DL (ref 31.4–37.4)
MCV RBC AUTO: 86 FL (ref 82–98)
PLATELET # BLD AUTO: 214 THOUSANDS/UL (ref 149–390)
PMV BLD AUTO: 9.1 FL (ref 8.9–12.7)
RBC # BLD AUTO: 5.21 MILLION/UL (ref 3.88–5.62)
WBC # BLD AUTO: 5.73 THOUSAND/UL (ref 4.31–10.16)

## 2022-10-10 PROCEDURE — 86803 HEPATITIS C AB TEST: CPT

## 2022-10-10 PROCEDURE — 85027 COMPLETE CBC AUTOMATED: CPT

## 2022-10-10 PROCEDURE — 36415 COLL VENOUS BLD VENIPUNCTURE: CPT

## 2022-10-10 PROCEDURE — 87389 HIV-1 AG W/HIV-1&-2 AB AG IA: CPT

## 2022-10-11 LAB — HIV 1+2 AB+HIV1 P24 AG SERPL QL IA: NORMAL

## 2022-11-08 ENCOUNTER — APPOINTMENT (OUTPATIENT)
Dept: LAB | Facility: CLINIC | Age: 57
End: 2022-11-08

## 2022-11-08 DIAGNOSIS — E78.5 HYPERLIPIDEMIA, UNSPECIFIED HYPERLIPIDEMIA TYPE: ICD-10-CM

## 2022-11-08 DIAGNOSIS — E53.9 VITAMIN B-COMPLEX DEFICIENCY: ICD-10-CM

## 2022-11-08 DIAGNOSIS — R73.03 DIABETES MELLITUS, LATENT: ICD-10-CM

## 2022-11-08 LAB
ALBUMIN SERPL BCP-MCNC: 3.4 G/DL (ref 3.5–5)
ALP SERPL-CCNC: 68 U/L (ref 46–116)
ALT SERPL W P-5'-P-CCNC: 31 U/L (ref 12–78)
ANION GAP SERPL CALCULATED.3IONS-SCNC: 4 MMOL/L (ref 4–13)
AST SERPL W P-5'-P-CCNC: 15 U/L (ref 5–45)
BILIRUB SERPL-MCNC: 0.6 MG/DL (ref 0.2–1)
BUN SERPL-MCNC: 16 MG/DL (ref 5–25)
CALCIUM ALBUM COR SERPL-MCNC: 9.7 MG/DL (ref 8.3–10.1)
CALCIUM SERPL-MCNC: 9.2 MG/DL (ref 8.3–10.1)
CHLORIDE SERPL-SCNC: 107 MMOL/L (ref 96–108)
CHOLEST SERPL-MCNC: 205 MG/DL
CO2 SERPL-SCNC: 29 MMOL/L (ref 21–32)
CREAT SERPL-MCNC: 0.82 MG/DL (ref 0.6–1.3)
EST. AVERAGE GLUCOSE BLD GHB EST-MCNC: 131 MG/DL
FOLATE SERPL-MCNC: >20 NG/ML (ref 3.1–17.5)
GFR SERPL CREATININE-BSD FRML MDRD: 97 ML/MIN/1.73SQ M
GLUCOSE P FAST SERPL-MCNC: 116 MG/DL (ref 65–99)
HBA1C MFR BLD: 6.2 %
HDLC SERPL-MCNC: 44 MG/DL
LDLC SERPL CALC-MCNC: 141 MG/DL (ref 0–100)
NONHDLC SERPL-MCNC: 161 MG/DL
POTASSIUM SERPL-SCNC: 4.1 MMOL/L (ref 3.5–5.3)
PROT SERPL-MCNC: 7.4 G/DL (ref 6.4–8.4)
SODIUM SERPL-SCNC: 140 MMOL/L (ref 135–147)
TRIGL SERPL-MCNC: 102 MG/DL
VIT B12 SERPL-MCNC: 299 PG/ML (ref 100–900)

## 2022-11-13 LAB — METHYLMALONATE SERPL-SCNC: 486 NMOL/L (ref 0–378)

## 2022-12-03 ENCOUNTER — APPOINTMENT (OUTPATIENT)
Dept: LAB | Facility: CLINIC | Age: 57
End: 2022-12-03

## 2022-12-03 DIAGNOSIS — E53.9 VITAMIN B-COMPLEX DEFICIENCY: ICD-10-CM

## 2022-12-03 LAB — VIT B12 SERPL-MCNC: 316 PG/ML (ref 100–900)

## 2022-12-06 LAB — METHYLMALONATE SERPL-SCNC: 232 NMOL/L (ref 0–378)

## 2022-12-08 ENCOUNTER — CONSULT (OUTPATIENT)
Dept: HEMATOLOGY ONCOLOGY | Facility: CLINIC | Age: 57
End: 2022-12-08

## 2022-12-08 VITALS
RESPIRATION RATE: 17 BRPM | SYSTOLIC BLOOD PRESSURE: 129 MMHG | OXYGEN SATURATION: 97 % | HEART RATE: 81 BPM | DIASTOLIC BLOOD PRESSURE: 79 MMHG | BODY MASS INDEX: 32.96 KG/M2 | TEMPERATURE: 97.5 F | HEIGHT: 67 IN | WEIGHT: 210 LBS

## 2022-12-08 DIAGNOSIS — E53.8 B12 DEFICIENCY: Primary | ICD-10-CM

## 2022-12-08 DIAGNOSIS — R76.8 ANA POSITIVE: ICD-10-CM

## 2022-12-08 RX ORDER — CYANOCOBALAMIN 1000 UG/ML
INJECTION, SOLUTION INTRAMUSCULAR; SUBCUTANEOUS
Qty: 8 ML | Refills: 2 | Status: SHIPPED | OUTPATIENT
Start: 2022-12-08

## 2022-12-08 NOTE — PROGRESS NOTES
Oncology Outpatient Consult Note  Joseline Scott 59 y o  male MRN: @ Encounter: [id number]        Date:  12/8/2022      Assessment/ Plan:    B12 deficiency without evidence of anemia  He has been receiving B12 IM monthly  Hypoferremia  Ferritin 15 6/2021    +FELECIA 1:40 in speckled pattern identified in 2018    Will investigate for pernicious anemia  Advised to increase his B12 administration IM to every other week  His wife has experience with shot administration and he is comfortable having her do this at home  F/U after lab assessment for re-evaluation  HPI:  Joseline Scott is a 59 y o  man of [address] descent seen for initial consultation 12/8/2022 at the referral of NANCY Acuna regarding low B12  Colonoscopy August 2016 by Dr Alesha Joshi internal hemorrhoids  Polyps identified which were removed diverticulosis noted  (initial recommendation was 5 years however after pathology came back with hyperplastic polyps recommendation was changed to 10 years  In August 2018  Lab work identified positive FELECIA 1: 40 in a speckled pattern  Rheumatoid factor, CCP positive   14 3 3 ETA protein test negative    Patient reports he did have some arthralgias and tingling which prompted assessment for B12 deficiency  B12 MMA Ferritin  6/21 514  15  5/22 149 862  7/22 481 85  11/22 299 486  12/22 316 232    Folate >20    Patient states that since he is on B12, his joint pain and tingling in his feet have improved  He eats a well-balanced diet with meat and vegetables  Denies any history of GI surgery  Denies any increased bruising or bleeding  No chest pain, shortness of breath or cough  Denies any nausea, vomiting, early satiety, diarrhea or constipation  No change with bowel movements              Test Results:      Labs:   Lab Results   Component Value Date    HGB 14 8 10/10/2022    HCT 45 0 10/10/2022    MCV 86 10/10/2022     10/10/2022    WBC 5 73 10/10/2022    NRBC 0 05/23/2022     Lab Results   Component Value Date     11/06/2017    K 4 1 11/08/2022     11/08/2022    CO2 29 11/08/2022    ANIONGAP 5 08/03/2015    BUN 16 11/08/2022    CREATININE 0 82 11/08/2022    GLUCOSE 109 08/03/2015    GLUF 116 (H) 11/08/2022    CALCIUM 9 2 11/08/2022    CORRECTEDCA 9 7 11/08/2022    AST 15 11/08/2022    ALT 31 11/08/2022    ALKPHOS 68 11/08/2022    PROT 6 6 05/09/2017    BILITOT 0 5 05/09/2017    EGFR 97 11/08/2022           Imaging:   No results found  ROS: As mentioned in HPI & Interval History otherwise 14 point ROS negative        Active Problems:   Patient Active Problem List   Diagnosis   • Trigger index finger of right hand   • Knee mass, right   • Status post arthroscopy of right knee   • Effusion of left knee   • Effusion of right knee   • Hyperlipidemia   • Localized primary osteoarthritis of lower leg, left   • Lumbar radiculopathy   • OA (osteoarthritis) of finger   • Obesity   • Severe sleep apnea   • Tear of lateral meniscus of left knee, current, initial encounter   • Prediabetes   • Allergic rhinitis due to allergen   • Fatigue   • Enlarged thyroid   • COVID-19 virus infection   • Personal history of colonic polyps       Past Medical History:   Past Medical History:   Diagnosis Date   • Anxiety    • Arthritis    • Depression    • Diabetes mellitus ([facility] [place] [address] )     NIDDM   • Hyperlipidemia    • Lumbar disc disorder     left leg and foot  numbness   • Meniscus tear    • Other specified joint disorders, right knee    • Seasonal allergies    • Trigger finger, left index finger     and right       Surgical History:   Past Surgical History:   Procedure Laterality Date   • ARTHROSCOPY KNEE Left 2/1/2016    Procedure: ARTHROSCOPY KNEE;  Surgeon: Aleyda Gonsalez MD;  [facility]: [facility];  Service:    • ARTHROSCOPY KNEE Right 12/21/2017    Procedure: KNEE ARTHROSCOPY; DECOMPRESSION OF ACL CYST, PARTIAL MEDIAL MENISECTOMY;  Surgeon: Benton Kat MD; Location: [facility];  Service: Orthopedics   • COLONOSCOPY  2016   • FRACTURE SURGERY Right     leg   • HERNIA REPAIR      umbilical    • IN COLONOSCOPY FLX DX W/COLLJ SPEC WHEN PFRMD N/A 8/18/2016    Procedure: COLONOSCOPY;  Surgeon: Meggan Topete MD;  Location: [facility]; Service: Gastroenterology   • IN INCISE FINGER TENDON SHEATH Left 2/6/2018    Procedure: INDEX FINGER TRIGGER RELEASE; LEFT LONG, RING, and SMALL FINGER INJECTIONS;  Surgeon: Antelmo Lloyd MD;  Location: [facility];  Service: Orthopedics   • IN KNEE SCOPE,MED/LAT MENISECTOMY Left 2/1/2016    Procedure: PARTIAL MENISCECTOMY MEDIAL, SYNOCECTOMY, CHONDROPLASTY;  Surgeon: Lucio Bruner MD;  Location[facility];  Service: Orthopedics       Family History:    Family History   Problem Relation Age of Onset   • Diabetes Mother    • Kidney disease Mother    • Diabetes Father        Cancer-related family history is not on file      Social History:   Social History     Socioeconomic History   • Marital status: /Civil Union     Spouse name: Not on file   • Number of children: Not on file   • Years of education: Not on file   • Highest education level: Not on file   Occupational History   • Occupation: [employer]   Tobacco Use   • Smoking status: Never   • Smokeless tobacco: Never   Vaping Use   • Vaping Use: Never used   Substance and Sexual Activity   • Alcohol use: Yes     Comment: few x year   • Drug use: No   • Sexual activity: Never   Other Topics Concern   • Not on file   Social History Narrative   • Not on file     Social Determinants of Health     Financial Resource Strain: Not on file   Food Insecurity: Not on file   Transportation Needs: Not on file   Physical Activity: Not on file   Stress: Not on file   Social Connections: Not on file   Intimate Partner Violence: Not on file   Housing Stability: Not on file       Current Medications:   Current Outpatient Medications   Medication Sig Dispense Refill   • albuterol (2 5 mg/3 mL) 0  083 % nebulizer solution Take 2 5 mg by nebulization every 6 (six) hours as needed for wheezing or shortness of breath     • albuterol (PROVENTIL HFA,VENTOLIN HFA) 90 mcg/act inhaler Inhale 2 puffs every 6 (six) hours as needed for wheezing     • amoxicillin-clavulanate (AUGMENTIN) 500-125 mg per tablet Take 1 tablet by mouth every 12 (twelve) hours (Patient not taking: No sig reported)  0   • ascorbic acid (VITAMIN C) 500 MG tablet TAKE 1 TABLET TWICE DAILY     • atorvastatin (LIPITOR) 40 mg tablet Take 1 tablet (40 mg total) by mouth daily (Patient not taking: No sig reported) 30 tablet 5   • calcium carbonate-vitamin D (OSCAL-D) 500 mg-200 units per tablet Take 1 tablet by mouth 2 (two) times a day with meals     • cyanocobalamin 1,000 mcg/mL INJECT 1 ML (1,000 MCG) BY SUBCUTANEOUS ROUTE ONCE A WEEK FOR 1 MONTH THEN ONCE EVERY 4 WEEKS (Patient not taking: Reported on 6/8/2022)     • erythromycin (ILOTYCIN) ophthalmic ointment APPLY IN LEFT EYE AT BEDTIME  0   • fexofenadine (ALLEGRA ODT) 30 MG disintegrating tablet Take 1 tablet (30 mg total) by mouth daily (Patient not taking: Reported on 7/20/2021) 30 tablet 5   • fluticasone (FLONASE) 50 mcg/act nasal spray 2 sprays into each nostril daily 1 Bottle 5   • ketotifen ([place]) 0 025 % ophthalmic solution      • levocetirizine (XYZAL) 5 MG tablet TAKE 1 TABLET (5 MG) BY ORAL ROUTE ONCE DAILY IN THE EVENING FOR ALLERGIES     • montelukast (SINGULAIR) 10 mg tablet Take 1 tablet (10 mg total) by mouth daily at bedtime (Patient taking differently: Take 10 mg by mouth daily at bedtime Taking once in a while) 30 tablet 5   • neomycin-bacitracin-polymyxin (NEOSPORIN) ophthalmic ointment Administer 0 5 inches into the left eye 4 (four) times a day 3 5 g 0   • olopatadine (PATANOL) 0 1 % ophthalmic solution      • rosuvastatin (CRESTOR) 5 mg tablet TAKE 1 TABLET (5 MG) BY ORAL ROUTE ONCE DAILY IN THE EVENING FOR CHOLESTEROL (Patient not taking: No sig reported)  5   • Symbicort 160-4 5 MCG/ACT inhaler INHALE 2 PUFFS TWICE A DAY BY INHALATION ROUTE  • triamcinolone (KENALOG) 0 1 % oral topical paste PLACE A SMALL AMOUNT TO THE AFFECTED AREA ON TONGUE AND MOUTH 2-3 TIMES DAILY AFTER MEALS (Patient not taking: Reported on 7/20/2021)  0     No current facility-administered medications for this visit  Allergies: Allergies   Allergen Reactions   • Other Other (See Comments)     Seasonal - nasal congestion  MONOCRYL - SUTURE         Physical Exam:    There is no height or weight on file to calculate BSA  Ht Readings from Last 3 Encounters:   11/09/21 5' 7" (1 702 m)   09/02/21 5' 7" (1 702 m)   07/20/21 5' 7" (1 702 m)       Wt Readings from Last 3 Encounters:   11/09/21 95 3 kg (210 lb)   09/02/21 94 7 kg (208 lb 12 8 oz)   07/20/21 94 8 kg (209 lb)        Temp Readings from Last 3 Encounters:   07/18/22 97 8 °F (36 6 °C)   07/07/22 98 2 °F (36 8 °C)   06/08/22 97 7 °F (36 5 °C)        BP Readings from Last 3 Encounters:   07/18/22 123/72   07/07/22 114/73   06/08/22 115/75         Pulse Readings from Last 3 Encounters:   07/18/22 80   07/07/22 75   06/08/22 74         Physical Exam    Physical Exam  Vitals reviewed  Constitutional:       General: He is not in acute distress  Appearance: He is well-developed  He is not diaphoretic  HENT:      Head: Normocephalic and atraumatic  Eyes:      Conjunctiva/sclera: Conjunctivae normal    Neck:      Trachea: No tracheal deviation  Cardiovascular:      Rate and Rhythm: Normal rate and regular rhythm  Heart sounds: No murmur heard  No friction rub  No gallop  Pulmonary:      Effort: Pulmonary effort is normal  No respiratory distress  Breath sounds: Normal breath sounds  No wheezing or rales  Chest:      Chest wall: No tenderness  Abdominal:      General: There is no distension  Palpations: Abdomen is soft  There is no mass  Tenderness: There is no abdominal tenderness     Musculoskeletal: Cervical back: Normal range of motion and neck supple  Lymphadenopathy:      Cervical: No cervical adenopathy  Skin:     General: Skin is warm and dry  Coloration: Skin is not pale  Findings: No erythema  Neurological:      Mental Status: He is alert and oriented to person, place, and time  Psychiatric:         Behavior: Behavior normal          Thought Content:  Thought content normal          Judgment: Judgment normal              Emergency Contacts:    Extended Emergency Contact Information  Primary Emergency Contact: [facility] Phone: [phone]  Relation: Spouse Cheiloplasty (Less Than 50%) Text: A decision was made to reconstruct the defect with a  cheiloplasty.  The defect was undermined extensively.  Additional obicularis oris muscle was excised with a 15 blade scalpel.  The defect was converted into a full thickness wedge, of less than 50% of the vertical height of the lip, to facilite a better cosmetic result.  Small vessels were then tied off with 5-0 monocyrl. The obicularis oris, superficial fascia, adipose and dermis were then reapproximated.  After the deeper layers were approximated the epidermis was reapproximated with particular care given to realign the vermilion border.

## 2022-12-13 ENCOUNTER — OFFICE VISIT (OUTPATIENT)
Dept: DENTISTRY | Facility: CLINIC | Age: 57
End: 2022-12-13

## 2022-12-13 VITALS — HEART RATE: 83 BPM | SYSTOLIC BLOOD PRESSURE: 115 MMHG | DIASTOLIC BLOOD PRESSURE: 74 MMHG

## 2022-12-13 DIAGNOSIS — Z01.21 ENCOUNTER FOR DENTAL EXAMINATION AND CLEANING WITH ABNORMAL FINDINGS: Primary | ICD-10-CM

## 2022-12-13 PROBLEM — E06.1 SUBACUTE THYROIDITIS: Status: ACTIVE | Noted: 2019-07-03

## 2022-12-13 PROBLEM — E53.8 VITAMIN B12 DEFICIENCY (NON ANEMIC): Status: ACTIVE | Noted: 2022-06-17

## 2022-12-13 PROBLEM — R73.09 ABNORMAL GLUCOSE LEVEL: Status: ACTIVE | Noted: 2019-07-03

## 2022-12-13 PROBLEM — R73.01 IMPAIRED FASTING GLUCOSE: Status: ACTIVE | Noted: 2019-10-01

## 2022-12-13 PROBLEM — I83.93 SPIDER VEINS OF BOTH LOWER EXTREMITIES: Status: ACTIVE | Noted: 2022-06-17

## 2022-12-13 PROBLEM — M19.90 OSTEOARTHRITIS: Status: ACTIVE | Noted: 2019-07-03

## 2022-12-13 PROBLEM — J45.909 ASTHMA: Status: ACTIVE | Noted: 2021-03-22

## 2022-12-13 PROBLEM — H00.019 EXTERNAL HORDEOLUM: Status: ACTIVE | Noted: 2019-07-03

## 2022-12-13 PROBLEM — G47.33 OBSTRUCTIVE SLEEP APNEA SYNDROME: Status: ACTIVE | Noted: 2019-07-03

## 2022-12-13 PROBLEM — E53.9 VITAMIN B DEFICIENCY: Status: ACTIVE | Noted: 2019-10-01

## 2022-12-13 PROBLEM — J30.9 ALLERGIC RHINITIS: Status: ACTIVE | Noted: 2019-07-25

## 2022-12-13 RX ORDER — UBIDECARENONE 100 MG
CAPSULE ORAL DAILY
COMMUNITY

## 2022-12-13 RX ORDER — LANCETS 33 GAUGE
EACH MISCELLANEOUS
COMMUNITY
Start: 2022-09-16

## 2022-12-13 RX ORDER — POLYVINYL ALCOHOL 14 MG/ML
SOLUTION/ DROPS OPHTHALMIC
COMMUNITY
Start: 2022-11-05

## 2022-12-13 RX ORDER — BLOOD-GLUCOSE METER
EACH MISCELLANEOUS
COMMUNITY
Start: 2022-10-17

## 2022-12-13 RX ORDER — BUDESONIDE AND FORMOTEROL FUMARATE DIHYDRATE 160; 4.5 UG/1; UG/1
AEROSOL RESPIRATORY (INHALATION)
COMMUNITY

## 2022-12-13 RX ORDER — BLOOD SUGAR DIAGNOSTIC
STRIP MISCELLANEOUS
COMMUNITY
Start: 2022-10-17

## 2022-12-13 NOTE — PROGRESS NOTES
Prophy    Dental procedures in this visit   •  - PERIODIC ORAL EVALUATION - ESTABLISHED PATIENT (Completed)     Service provider: Jun Dove DMD     Billing provider: Josiah Lugo DDS   •  - PROPHYLAXIS - ADULT (Completed)     Service provider: Terry Palmer     Billing provider: Josiah Lugo DDS      Completion details   •  - PERIODIC ORAL EVALUATION - ESTABLISHED PATIENT (Completed)   •  - PROPHYLAXIS - ADULT (Completed)    See notes           CC: "I have sensitivity on the upper left to brushing and my gums bleed between my two teeth  " Pt is pointing to #11/12 interprox and #15-B  Reviewed Medical History  ASA: II  Translation line used: no    Method Used:  · Prophy Method Used: Ultrasonic Scaling  · Hand Scaling  · Polished  · Flossed   · Applied gingicaine to #11/12 during scaling today, pt was still sensitive  Radiographs Taken:  · None    Intra/Extra Oral Cancer Screening:  · Within normal limits      Oral Hygiene:  · Fair    Plaque:  · Generalized  · Light    Calculus:  · Localized  · Lower anteriors  · Posteriors    Bleeding:  · Bleeding on probing: No periodontal exam for this visit  · Localized  · Moderate    Stain:  · Generalized  · Moderate        OHI: Stressed importance of brushing 2x/day and flossing daily  Recommended daily mouthrinse- Listerine and consider an electric tb  Terry Palmer, [facility]     No orders of the defined types were placed in this encounter  Periodic Exam:  Dr Lydia Dunlap  did exam:    Decay present: no  Rec returning to smooth #13- overhang and eval #12-M better when anesthetized  Pt very sensitive between #11/12 and it bleeds very easily  OCS-neg    Pt dismissed in good health, no complications and all questions answered  NV: smooth #13- overhang and eval #12-M/11-D when numb  NV2: 6 mrc, periodic exam, 4 bw with hygiene

## 2023-01-09 ENCOUNTER — APPOINTMENT (OUTPATIENT)
Dept: LAB | Facility: CLINIC | Age: 58
End: 2023-01-09

## 2023-01-09 DIAGNOSIS — E53.8 B12 DEFICIENCY: ICD-10-CM

## 2023-01-09 DIAGNOSIS — R76.8 ANA POSITIVE: ICD-10-CM

## 2023-01-09 LAB
ALBUMIN SERPL BCP-MCNC: 3.7 G/DL (ref 3.5–5)
ALP SERPL-CCNC: 76 U/L (ref 46–116)
ALT SERPL W P-5'-P-CCNC: 30 U/L (ref 12–78)
ANA SER QL IA: NEGATIVE
ANION GAP SERPL CALCULATED.3IONS-SCNC: 2 MMOL/L (ref 4–13)
AST SERPL W P-5'-P-CCNC: 16 U/L (ref 5–45)
BASOPHILS # BLD AUTO: 0.05 THOUSANDS/ÂΜL (ref 0–0.1)
BASOPHILS NFR BLD AUTO: 1 % (ref 0–1)
BILIRUB SERPL-MCNC: 0.59 MG/DL (ref 0.2–1)
BUN SERPL-MCNC: 17 MG/DL (ref 5–25)
CALCIUM SERPL-MCNC: 9.4 MG/DL (ref 8.3–10.1)
CHLORIDE SERPL-SCNC: 105 MMOL/L (ref 96–108)
CO2 SERPL-SCNC: 31 MMOL/L (ref 21–32)
CREAT SERPL-MCNC: 0.84 MG/DL (ref 0.6–1.3)
CRP SERPL QL: <3 MG/L
EOSINOPHIL # BLD AUTO: 0.19 THOUSAND/ÂΜL (ref 0–0.61)
EOSINOPHIL NFR BLD AUTO: 4 % (ref 0–6)
ERYTHROCYTE [DISTWIDTH] IN BLOOD BY AUTOMATED COUNT: 14 % (ref 11.6–15.1)
GFR SERPL CREATININE-BSD FRML MDRD: 95 ML/MIN/1.73SQ M
GLUCOSE P FAST SERPL-MCNC: 127 MG/DL (ref 65–99)
HCT VFR BLD AUTO: 47 % (ref 36.5–49.3)
HGB BLD-MCNC: 15.6 G/DL (ref 12–17)
IMM GRANULOCYTES # BLD AUTO: 0.01 THOUSAND/UL (ref 0–0.2)
IMM GRANULOCYTES NFR BLD AUTO: 0 % (ref 0–2)
LYMPHOCYTES # BLD AUTO: 1.93 THOUSANDS/ÂΜL (ref 0.6–4.47)
LYMPHOCYTES NFR BLD AUTO: 36 % (ref 14–44)
MCH RBC QN AUTO: 28.7 PG (ref 26.8–34.3)
MCHC RBC AUTO-ENTMCNC: 33.2 G/DL (ref 31.4–37.4)
MCV RBC AUTO: 86 FL (ref 82–98)
MONOCYTES # BLD AUTO: 0.42 THOUSAND/ÂΜL (ref 0.17–1.22)
MONOCYTES NFR BLD AUTO: 8 % (ref 4–12)
NEUTROPHILS # BLD AUTO: 2.83 THOUSANDS/ÂΜL (ref 1.85–7.62)
NEUTS SEG NFR BLD AUTO: 51 % (ref 43–75)
NRBC BLD AUTO-RTO: 0 /100 WBCS
PLATELET # BLD AUTO: 234 THOUSANDS/UL (ref 149–390)
PMV BLD AUTO: 9.8 FL (ref 8.9–12.7)
POTASSIUM SERPL-SCNC: 4.3 MMOL/L (ref 3.5–5.3)
PROT SERPL-MCNC: 7.8 G/DL (ref 6.4–8.4)
RBC # BLD AUTO: 5.44 MILLION/UL (ref 3.88–5.62)
SODIUM SERPL-SCNC: 138 MMOL/L (ref 135–147)
VIT B12 SERPL-MCNC: 428 PG/ML (ref 100–900)
WBC # BLD AUTO: 5.43 THOUSAND/UL (ref 4.31–10.16)

## 2023-01-11 LAB
IF BLOCK AB SER QL RIA: 0.9 AU/ML (ref 0–1.1)
PCA AB SER-ACNC: 36.8 UNITS (ref 0–20)

## 2023-01-17 ENCOUNTER — OFFICE VISIT (OUTPATIENT)
Dept: HEMATOLOGY ONCOLOGY | Facility: CLINIC | Age: 58
End: 2023-01-17

## 2023-01-17 VITALS
WEIGHT: 209 LBS | RESPIRATION RATE: 17 BRPM | BODY MASS INDEX: 32.8 KG/M2 | SYSTOLIC BLOOD PRESSURE: 124 MMHG | OXYGEN SATURATION: 96 % | HEIGHT: 67 IN | DIASTOLIC BLOOD PRESSURE: 80 MMHG | HEART RATE: 87 BPM

## 2023-01-17 DIAGNOSIS — D51.0 PERNICIOUS ANEMIA: Primary | ICD-10-CM

## 2023-01-17 DIAGNOSIS — E53.8 VITAMIN B12 DEFICIENCY (NON ANEMIC): ICD-10-CM

## 2023-01-17 NOTE — PROGRESS NOTES
Hematology/Oncology Outpatient Follow- up Note  Saw Freeman Almanzar 59 y o  male MRN: @ Encounter: [id number]        Date:  1/17/2023      Assessment / Plan:    B12 deficiency without evidence of anemia  He has been receiving B12 IM monthly  Advised to increase his B12 administration IM to every other week at his 12/13/22 office visit  B12 improved further  B12      MMA    Ferritin  6/21     514       ---           15  5/22     149      862  7/22     481      85  11/22   299      486  12/22   316      232  1/23 428     1/23/23    Anti-parietal antibody elevated at 36 8 (ULN 20)  Normal intrinsic factor antibody  Hypoferremia  Ferritin 15 6/2021        +FELECIA 1:40 in speckled pattern identified in 2018  Negative FELECIA 1/9/23  Reviewed the findings of antiparietal antibody which is likely suggestive of autoimmune gastritis  Discussed evaluation by GI possible endoscopy  He also is due for screening colonoscopy  Continue with B12 IM every 2 weeks  Recheck labs in 3 to 4 months            HPI:  Claudene Kindle is a 59 y o  man of [address] [place] [address] descent seen for initial consultation 12/8/2022 at the referral of NANCY Ruiz regarding low B12      Colonoscopy August 2016 by Dr Gail George internal hemorrhoids  Polyps identified which were removed diverticulosis noted  (initial recommendation was 5 years however after pathology came back with hyperplastic polyps recommendation was changed to 10 years         In August 2018  Lab work identified positive FELECIA 1: 40 in a speckled pattern    Rheumatoid factor, CCP positive   14 3 3 ETA protein test negative     Patient reports he did have some arthralgias and tingling which prompted assessment for B12 deficiency                  B12      MMA    Ferritin  6/21     514                  15  5/22     149      862  7/22     481      85  11/22   299      486  12/22   316      232     Folate >20     Patient reported that since he is on B12, his joint pain and tingling in his feet have improved  He eats a well-balanced diet with meat and vegetables  Denies any history of GI surgery  Denies any increased bruising or bleeding  No chest pain, shortness of breath or cough  Denies any nausea, vomiting, early satiety, diarrhea or constipation  No change with bowel movements  He had colonoscopy August 18, 2016 by Dr Josué Joy  He underwent polypectomy  5-year recall was requested  Interval History:    1/9/23 CBCD normal; B12 improved to 428  Anti-parietal antibody elevated at 36 8 (ULN 20)  Normal intrinsic factor antibody  CRP, FELECIA are normal  CMP stable with elevated glucose      Test Results:        Labs:   Lab Results   Component Value Date    HGB 15 6 01/09/2023    HCT 47 0 01/09/2023    MCV 86 01/09/2023     01/09/2023    WBC 5 43 01/09/2023    NRBC 0 01/09/2023     Lab Results   Component Value Date     11/06/2017    K 4 3 01/09/2023     01/09/2023    CO2 31 01/09/2023    ANIONGAP 5 08/03/2015    BUN 17 01/09/2023    CREATININE 0 84 01/09/2023    GLUCOSE 109 08/03/2015    GLUF 127 (H) 01/09/2023    CALCIUM 9 4 01/09/2023    CORRECTEDCA 9 7 11/08/2022    AST 16 01/09/2023    ALT 30 01/09/2023    ALKPHOS 76 01/09/2023    PROT 6 6 05/09/2017    BILITOT 0 5 05/09/2017    EGFR 95 01/09/2023       Imaging: No results found  ROS:  As mentioned in HPI & Interval History otherwise 14 point ROS negative  Allergies: Allergies   Allergen Reactions   • Other Other (See Comments)     Seasonal - nasal congestion  MONOCRYL - SUTURE     Current Medications: Reviewed  PMH/FH/SH:  Reviewed      Physical Exam:    There is no height or weight on file to calculate BSA      Ht Readings from Last 3 Encounters:   12/08/22 5' 7" (1 702 m)   11/09/21 5' 7" (1 702 m)   09/02/21 5' 7" (1 702 m)        Wt Readings from Last 3 Encounters:   12/08/22 95 3 kg (210 lb)   11/09/21 95 3 kg (210 lb)   09/02/21 94 7 kg (208 lb 12 8 oz) Temp Readings from Last 3 Encounters:   12/08/22 97 5 °F (36 4 °C) (Temporal)   07/18/22 97 8 °F (36 6 °C)   07/07/22 98 2 °F (36 8 °C)        BP Readings from Last 3 Encounters:   12/13/22 115/74   12/08/22 129/79   07/18/22 123/72           Physical Exam  Vitals reviewed  Constitutional:       General: He is not in acute distress  Appearance: He is well-developed  He is not diaphoretic  HENT:      Head: Normocephalic and atraumatic  Eyes:      Conjunctiva/sclera: Conjunctivae normal    Neck:      Trachea: No tracheal deviation  Cardiovascular:      Rate and Rhythm: Normal rate and regular rhythm  Heart sounds: No murmur heard  No friction rub  No gallop  Pulmonary:      Effort: Pulmonary effort is normal  No respiratory distress  Breath sounds: Normal breath sounds  No wheezing or rales  Chest:      Chest wall: No tenderness  Abdominal:      General: There is no distension  Palpations: Abdomen is soft  Tenderness: There is no abdominal tenderness  Musculoskeletal:      Cervical back: Normal range of motion and neck supple  Lymphadenopathy:      Cervical: No cervical adenopathy  Skin:     General: Skin is warm and dry  Coloration: Skin is not pale  Findings: No erythema  Neurological:      General: No focal deficit present  Mental Status: He is alert and oriented to person, place, and time  Psychiatric:         Behavior: Behavior normal          Thought Content:  Thought content normal          Judgment: Judgment normal            Emergency Contacts:    Extended Emergency Contact Information  Primary Emergency Contact: [address] Phone: [phone]  Relation: Spouse

## 2023-02-09 ENCOUNTER — OFFICE VISIT (OUTPATIENT)
Dept: DENTISTRY | Facility: CLINIC | Age: 58
End: 2023-02-09

## 2023-02-09 VITALS — SYSTOLIC BLOOD PRESSURE: 127 MMHG | HEART RATE: 74 BPM | TEMPERATURE: 96.9 F | DIASTOLIC BLOOD PRESSURE: 78 MMHG

## 2023-02-09 DIAGNOSIS — K08.89 PAIN, DENTAL: Primary | ICD-10-CM

## 2023-02-09 NOTE — PROGRESS NOTES
Gabriele Lay presented to [facility] for assessment of #11/12 interproximal area and smoothing of #12 B restoration  PMH reviewed, no changes  Pt's CC "It always bleeds here when I floss (pointing between #11/12) and it is sensitive here (pointing to #14)  "    Pt states he does not floss there every day  Explained to pt that gingiva is inflamed and he needs to floss there every day/after every meal to keep area clean  PA radiograph and BW of #12 area taken  WNL  Pt had food/plaque build up interproximal between #11 and #12  Applied topical benzocaine, administered 0 5 carpules 4% articaine 1:100k epi via buccal infiltration  Flossed with regular floss and superfloss  Bleeding occurred  Appears to be indent on root structure of #12  Explained to pt to keep area very clean  No caries noted  Explained we will re-evaluate when completing #14 resin  #14 B(V) resin was completed in 7/2022  Appears to be missing, explained to pt that we can re-do resin  Pt agreed  Provided pt proxy brush and demonstrated how to use brush in mirror  Pt left ambulatory and satisfied       NV: #14 B(V) resin

## 2023-03-01 ENCOUNTER — OFFICE VISIT (OUTPATIENT)
Dept: GASTROENTEROLOGY | Facility: CLINIC | Age: 58
End: 2023-03-01

## 2023-03-01 VITALS
DIASTOLIC BLOOD PRESSURE: 70 MMHG | BODY MASS INDEX: 32.8 KG/M2 | WEIGHT: 209 LBS | TEMPERATURE: 98.7 F | HEIGHT: 67 IN | HEART RATE: 105 BPM | SYSTOLIC BLOOD PRESSURE: 105 MMHG

## 2023-03-01 DIAGNOSIS — E53.8 VITAMIN B12 DEFICIENCY (NON ANEMIC): ICD-10-CM

## 2023-03-01 DIAGNOSIS — Z12.11 SCREENING FOR COLON CANCER: ICD-10-CM

## 2023-03-01 DIAGNOSIS — D51.0 PERNICIOUS ANEMIA: Primary | ICD-10-CM

## 2023-03-01 NOTE — PATIENT INSTRUCTIONS
Scheduled date of EGD/colonoscopy (as of today):06/07/2023  Physician performing EGD/colonoscopy: Tanner  Location of EGD/colonoscopy:[place]  Desired bowel prep reviewed with patient: Golytely  Instructions reviewed with patient by:Maryana  Clearances:   N/A

## 2023-03-01 NOTE — PROGRESS NOTES
[facility] - Outpatient Consultation  iDane Kirkpatrick 59 y o  male MRN: [id number]  Encounter: [id number]          ASSESSMENT AND PLAN:      1  Pernicious anemia  2  Vitamin B12 deficiency (non anemic)  Patient with diagnosis of B12 deficiency without anemia, currently being followed by oncology  Intrinsic factor blocking antibodies are negative but antiparietal antibodies are positive  MMA was initially high, now is improving, oncology plan reviewed, currently on B12 IM every 2 weeks, follow up with labs in 3 to 4 months  Most recent CBC with normal hemoglobin, normal MCV  Given the presence of B12 deficiency he was referred to GI to assess for atrophic gastritis as well as the presence of metaplasia/dysplasia in his stomach  We will perform EGD with mapping of the stomach      3  Screening for colon cancer  - Patient is 59 y o , he has been screened for colon cancer in the past, most recent colonoscopy was in 2015, at that time he was found to have 2 polyps and he was recommended to repeat in 5 years, pathology was checked and the patient had hyperplastic polyps, he denies any family history of GI malignancy or IBD, currently with B12 deficiency but otherwise no alarm symptoms, will perform colonoscopy, risk and benefits of the procedure were discussed with the patient including but not limited to bleeding, infection, perforation and missing an adenoma   ______________________________________________________________________    HPI:  Patient seen and examined, he is a 43-year-old male patient referred by oncology due to B12 deficiency, no previous EGD, he denies any recent events, currently is tolerating PO route, denies nausea or vomiting, is passing flatus and having bowel movements, denies abdominal pain, no recent weight loss      REVIEW OF SYSTEMS:    CONSTITUTIONAL: Denies any fever, chills, or weight loss  HEENT: No earache or visual disturbances    CARDIOVASCULAR: No chest pain or palpitations  RESPIRATORY: Denies shortness of breath or dyspnea on exertion  GASTROINTESTINAL: As noted in the History of Present Illness  GENITOURINARY: No problems with urination  NEUROLOGIC: No dizziness or headaches  MUSCULOSKELETAL: No muscle or joint pain  SKIN: No skin rashes or itching  ENDOCRINE: No intolerance to heat or cold  Historical Information   Past Medical History:   Diagnosis Date   • Anxiety    • Arthritis    • Depression    • Diabetes mellitus (HCC)     NIDDM   • Hyperlipidemia    • Lumbar disc disorder     left leg and foot  numbness   • Meniscus tear    • Other specified joint disorders, right knee    • Seasonal allergies    • Trigger finger, left index finger     and right     Past Surgical History:   Procedure Laterality Date   • ARTHROSCOPY KNEE Left 2/1/2016    Procedure: ARTHROSCOPY KNEE;  Surgeon: Adarsh Peterson MD;  [facility];  Service:    • ARTHROSCOPY KNEE Right 12/21/2017    Procedure: KNEE ARTHROSCOPY; DECOMPRESSION OF ACL CYST, PARTIAL MEDIAL MENISECTOMY;  Surgeon: Demario Ragsdale MD;  [facility];  Service: Orthopedics   • COLONOSCOPY  2016   • FRACTURE SURGERY Right     leg   • HERNIA REPAIR      umbilical    • AR ARTHRS KNE SURG W/MENISCECTOMY MED/LAT W/SHVG Left 2/1/2016    Procedure: PARTIAL MENISCECTOMY MEDIAL, SYNOCECTOMY, CHONDROPLASTY;  Surgeon: Adarsh Peterson MD;  [facility];  Service: Orthopedics   • AR COLONOSCOPY FLX DX W/COLLJ SPEC WHEN PFRMD N/A 8/18/2016    Procedure: COLONOSCOPY;  Surgeon: Donovan Zeng MD;  [facility];   Service: Gastroenterology   • AR TENDON SHEATH INCISION Left 2/6/2018    Procedure: INDEX FINGER TRIGGER RELEASE; LEFT LONG, RING, and SMALL FINGER INJECTIONS;  Surgeon: Tanika Watts MD;  [facility];  Service: Orthopedics     Social History   Social History     Substance and Sexual Activity   Alcohol Use Yes    Comment: few x year     Social History     Substance and Sexual Activity   Drug Use No     Social History     Tobacco Use   Smoking Status Never   Smokeless Tobacco Never     Family History   Problem Relation Age of Onset   • Diabetes Mother    • Kidney disease Mother    • Diabetes Father        Meds/Allergies       Current Outpatient Medications:   •  albuterol (2 5 mg/3 mL) 0 083 % nebulizer solution  •  albuterol (PROVENTIL HFA,VENTOLIN HFA) 90 mcg/act inhaler  •  ascorbic acid (VITAMIN C) 500 MG tablet  •  bisacodyl (DULCOLAX) 5 mg EC tablet  •  Blood Glucose Monitoring Suppl (ONE TOUCH ULTRA 2) w/Device KIT  •  budesonide-formoterol (SYMBICORT) 160-4 5 mcg/act inhaler  •  calcium carbonate-vitamin D (OSCAL-D) 500 mg-200 units per tablet  •  co-enzyme Q-10 100 mg capsule  •  cyanocobalamin 1,000 mcg/mL  •  erythromycin (ILOTYCIN) ophthalmic ointment  •  fexofenadine (ALLEGRA ODT) 30 MG disintegrating tablet  •  fluticasone (FLONASE) 50 mcg/act nasal spray  •  ketotifen (ZADITOR) 0 025 % ophthalmic solution  •  Lancets (OneTouch Delica Plus [id number]) MISC  •  levocetirizine (XYZAL) 5 MG tablet  •  olopatadine (PATANOL) 0 1 % ophthalmic solution  •  OneTouch Ultra test strip  •  polyethylene glycol (GOLYTELY) 4000 mL solution  •  polyvinyl alcohol (LIQUIFILM TEARS) 1 4 % ophthalmic solution  •  Symbicort 160-4 5 MCG/ACT inhaler  •  SYRINGE/NEEDLE, DISP, 1 ML 23G X 1" 1 ML MISC  •  VITAMINS B1 B6 B12 PO    Allergies   Allergen Reactions   • Other Other (See Comments)     Seasonal - nasal congestion  MONOCRYL - SUTURE           Objective     Blood pressure 105/70, pulse 105, temperature 98 7 °F (37 1 °C), temperature source Tympanic, height 5' 7" (1 702 m), weight 94 8 kg (209 lb)  Body mass index is 32 73 kg/m²  PHYSICAL EXAM:      General Appearance:   Alert, cooperative, no distress   HEENT:   Normocephalic, atraumatic, anicteric       Neck:  Supple, symmetrical, trachea midline   Lungs:   Clear to auscultation bilaterally; no rales, rhonchi or wheezing; respirations unlabored    Heart[de-identified]   Regular rate and rhythm  Abdomen:   Soft, non-tender, non-distended; normal bowel sounds; no masses, no organomegaly    Genitalia:   Deferred    Rectal:   Deferred    Extremities:  No cyanosis or edema                  Lymph nodes: No palpable cervical lymphadenopathy   Skin:  No jaundice, rashes, or lesions              Lab Results:   No visits with results within 1 Day(s) from this visit     Latest known visit with results is:   Appointment on 01/09/2023   Component Date Value   • WBC 01/09/2023 5 43    • RBC 01/09/2023 5 44    • Hemoglobin 01/09/2023 15 6    • Hematocrit 01/09/2023 47 0    • MCV 01/09/2023 86    • MCH 01/09/2023 28 7    • MCHC 01/09/2023 33 2    • RDW 01/09/2023 14 0    • MPV 01/09/2023 9 8    • Platelets 33/85/7528 234    • nRBC 01/09/2023 0    • Neutrophils Relative 01/09/2023 51    • Immat GRANS % 01/09/2023 0    • Lymphocytes Relative 01/09/2023 36    • Monocytes Relative 01/09/2023 8    • Eosinophils Relative 01/09/2023 4    • Basophils Relative 01/09/2023 1    • Neutrophils Absolute 01/09/2023 2 83    • Immature Grans Absolute 01/09/2023 0 01    • Lymphocytes Absolute 01/09/2023 1 93    • Monocytes Absolute 01/09/2023 0 42    • Eosinophils Absolute 01/09/2023 0 19    • Basophils Absolute 01/09/2023 0 05    • Vitamin B-12 01/09/2023 428    • Parietal Cell Ab 01/09/2023 36 8 (H)    • Intrinsic Factor 01/09/2023 0 9    • CRP 01/09/2023 <3 0    • Sodium 01/09/2023 138    • Potassium 01/09/2023 4 3    • Chloride 01/09/2023 105    • CO2 01/09/2023 31    • ANION GAP 01/09/2023 2 (L)    • BUN 01/09/2023 17    • Creatinine 01/09/2023 0 84    • Glucose, Fasting 01/09/2023 127 (H)    • Calcium 01/09/2023 9 4    • AST 01/09/2023 16    • ALT 01/09/2023 30    • Alkaline Phosphatase 01/09/2023 76    • Total Protein 01/09/2023 7 8    • Albumin 01/09/2023 3 7    • Total Bilirubin 01/09/2023 0 59    • eGFR 01/09/2023 95    • FELECIA 01/09/2023 Negative          Radiology Results:   No results found

## 2023-05-20 ENCOUNTER — APPOINTMENT (OUTPATIENT)
Dept: LAB | Facility: CLINIC | Age: 58
End: 2023-05-20

## 2023-05-20 DIAGNOSIS — E53.8 VITAMIN B12 DEFICIENCY (NON ANEMIC): ICD-10-CM

## 2023-05-20 DIAGNOSIS — E53.8 BIOTIN-(PROPIONYL-COA-CARBOXYLASE) LIGASE DEFICIENCY: ICD-10-CM

## 2023-05-20 DIAGNOSIS — D51.0 PERNICIOUS ANEMIA: ICD-10-CM

## 2023-05-20 LAB
ERYTHROCYTE [DISTWIDTH] IN BLOOD BY AUTOMATED COUNT: 14 % (ref 11.6–15.1)
FERRITIN SERPL-MCNC: 14 NG/ML (ref 24–336)
HCT VFR BLD AUTO: 45.3 % (ref 36.5–49.3)
HGB BLD-MCNC: 15.2 G/DL (ref 12–17)
IRON SATN MFR SERPL: 36 % (ref 20–50)
IRON SERPL-MCNC: 132 UG/DL (ref 65–175)
MCH RBC QN AUTO: 28.8 PG (ref 26.8–34.3)
MCHC RBC AUTO-ENTMCNC: 33.6 G/DL (ref 31.4–37.4)
MCV RBC AUTO: 86 FL (ref 82–98)
PLATELET # BLD AUTO: 226 THOUSANDS/UL (ref 149–390)
PMV BLD AUTO: 10.3 FL (ref 8.9–12.7)
RBC # BLD AUTO: 5.27 MILLION/UL (ref 3.88–5.62)
TIBC SERPL-MCNC: 368 UG/DL (ref 250–450)
TSH SERPL DL<=0.05 MIU/L-ACNC: 1.9 UIU/ML (ref 0.45–4.5)
VIT B12 SERPL-MCNC: 630 PG/ML (ref 180–914)
WBC # BLD AUTO: 5.08 THOUSAND/UL (ref 4.31–10.16)

## 2023-05-22 LAB
ENDOMYSIUM IGA SER QL: NEGATIVE
GLIADIN PEPTIDE IGA SER-ACNC: 8 UNITS (ref 0–19)
GLIADIN PEPTIDE IGG SER-ACNC: 2 UNITS (ref 0–19)
IGA SERPL-MCNC: 338 MG/DL (ref 90–386)
PCA AB SER-ACNC: 39.9 UNITS (ref 0–20)
TTG IGA SER-ACNC: <2 U/ML (ref 0–3)
TTG IGG SER-ACNC: 8 U/ML (ref 0–5)

## 2023-05-26 LAB — METHYLMALONATE SERPL-SCNC: 85 NMOL/L (ref 0–378)

## 2023-05-30 ENCOUNTER — OFFICE VISIT (OUTPATIENT)
Dept: HEMATOLOGY ONCOLOGY | Facility: CLINIC | Age: 58
End: 2023-05-30

## 2023-05-30 VITALS
WEIGHT: 209 LBS | BODY MASS INDEX: 32.8 KG/M2 | DIASTOLIC BLOOD PRESSURE: 78 MMHG | OXYGEN SATURATION: 95 % | HEIGHT: 67 IN | SYSTOLIC BLOOD PRESSURE: 110 MMHG | HEART RATE: 83 BPM | TEMPERATURE: 97.6 F

## 2023-05-30 DIAGNOSIS — E53.8 VITAMIN B12 DEFICIENCY (NON ANEMIC): ICD-10-CM

## 2023-05-30 DIAGNOSIS — E53.8 B12 DEFICIENCY: Primary | ICD-10-CM

## 2023-05-30 DIAGNOSIS — D51.0 PERNICIOUS ANEMIA: ICD-10-CM

## 2023-05-30 RX ORDER — CYANOCOBALAMIN (VITAMIN B-12) 100 MCG
1000 TABLET ORAL DAILY
Qty: 90 TABLET | Refills: 2 | Status: SHIPPED | OUTPATIENT
Start: 2023-05-30

## 2023-05-30 NOTE — PROGRESS NOTES
Hematology/Oncology Outpatient Follow- up Note  Lizeth Coates 59 y o  male MRN: @ Encounter: [id number]        Date:  5/30/2023      Assessment / Plan:    B12 deficiency without evidence of anemia diagnosed 5/22    + Anti-parietal antibody elevated at 36 8    Reviewed the findings of antiparietal antibody which is likely suggestive of autoimmune gastritis  Met with Dr Elias Andres  EGD and colonoscopy scheduled for 6/7/23  He had been receiving B12 IM monthly   Advised to increase his B12 administration IM to every other week at his 12/13/22 office visit  B12 improved further                    B12      MMA    Ferritin  6/21     514       ---           15  5/22     149      862  7/22     481      85  11/22   299      486  12/22   316      232  1/23     428  5/23 630 85   14    Reviewed labs with patient  Will see if he can keep his levels elevated with [facility] B12  B12 1000 mcg sublingual daily requested  D/C B12 IM  Will recheck levels in 3-4 months  Weakly + TTG Ab of 8 5/2023  Remainder of celiac disease antibody panel normal   Patient is not experiencing any abdominal cramping nor diarrhea         Hypoferremia   Ferritin 15 6/2021 5/20/23 ferritin 14;  Iron saturation 36%  MVI with iron      +FELECIA 1:40 in speckled pattern identified in 2018  Negative FELECIA 1/9/23                    HPI:  Saw Bernal is a 59 y  o  man [place] seen for initial consultation 12/8/2022 at the referral of NANCY García regarding low B12      Colonoscopy August 2016 by Dr Pam Lynn internal hemorrhoids   Polyps identified which were removed diverticulosis noted    (initial recommendation was 5 years however after pathology came back with hyperplastic polyps recommendation was changed to 10 years         In August 2018   Lab work identified positive FELECIA 1: 40 in a speckled pattern   Rheumatoid factor, CCP positive   14 3 3 ETA protein test negative     Patient reports he did have some arthralgias and tingling which prompted assessment for B12 deficiency                 B12      MMA    Ferritin  1/29     288                  43  5/22     149      862  7/22     481      85  11/22   299      486  12/22   316      232     Folate >20     Patient reported that since he is on B12, his joint pain and tingling in his feet have improved   He eats a well-balanced diet with meat and vegetables   Denies any history of GI surgery   Denies any increased bruising or bleeding   No chest pain, shortness of breath or cough  Denies any nausea, vomiting, early satiety, diarrhea or constipation   No change with bowel movements      1/9/23 CBCD normal; B12 improved to 428  Anti-parietal antibody elevated at 36 8 (ULN 20)  Normal intrinsic factor antibody  CRP, FELECIA are normal  CMP stable with elevated glucose       Interval History:        Review of Systems   Constitutional: Negative for appetite change, chills, diaphoresis, fatigue, fever and unexpected weight change  HENT:   Negative for mouth sores, nosebleeds, sore throat, tinnitus and voice change  Eyes: Negative for eye problems  Respiratory: Negative for chest tightness, cough, shortness of breath and wheezing  Cardiovascular: Negative for chest pain, leg swelling and palpitations  Gastrointestinal: Negative for abdominal distention, abdominal pain, blood in stool, constipation, diarrhea, nausea, rectal pain and vomiting  Endocrine: Negative for hot flashes  Genitourinary: Negative  Musculoskeletal: Negative for gait problem and myalgias  Skin: Negative for itching and rash  Neurological: Negative for dizziness, gait problem, headaches, light-headedness and numbness  Hematological: Negative for adenopathy  Psychiatric/Behavioral: Negative for confusion and sleep disturbance  The patient is not nervous/anxious           Test Results:        Labs:   Lab Results   Component Value Date    HCT 45 3 05/20/2023 "HGB 15 2 05/20/2023    MCV 86 05/20/2023    NRBC 0 01/09/2023     05/20/2023    WBC 5 08 05/20/2023     Lab Results   Component Value Date    ALKPHOS 76 01/09/2023    ALT 30 01/09/2023    ANIONGAP 5 08/03/2015    AST 16 01/09/2023    BILITOT 0 5 05/09/2017    BUN 17 01/09/2023    CALCIUM 9 4 01/09/2023     01/09/2023    CO2 31 01/09/2023    CORRECTEDCA 9 7 11/08/2022    CREATININE 0 84 01/09/2023    EGFR 95 01/09/2023    GLUCOSE 109 08/03/2015    GLUF 127 (H) 01/09/2023    K 4 3 01/09/2023     11/06/2017    PROT 6 6 05/09/2017           Imaging: No results found  Allergies: Allergies   Allergen Reactions   • Other Other (See Comments)     Seasonal - nasal congestion  MONOCRYL - SUTURE     Current Medications: Reviewed  PMH/FH/SH:  Reviewed      Physical Exam:    There is no height or weight on file to calculate BSA  Ht Readings from Last 3 Encounters:   03/01/23 5' 7\" (1 702 m)   01/17/23 5' 7\" (1 702 m)   12/08/22 5' 7\" (1 702 m)        Wt Readings from Last 3 Encounters:   03/01/23 94 8 kg (209 lb)   01/17/23 94 8 kg (209 lb)   12/08/22 95 3 kg (210 lb)        Temp Readings from Last 3 Encounters:   04/11/23 (!) 96 2 °F (35 7 °C)   03/01/23 98 7 °F (37 1 °C) (Tympanic)   02/09/23 (!) 96 9 °F (36 1 °C)        BP Readings from Last 3 Encounters:   04/11/23 128/78   03/01/23 105/70   02/09/23 127/78             Physical Exam  Constitutional:       Appearance: He is well-developed  HENT:      Head: Normocephalic and atraumatic  Cardiovascular:      Rate and Rhythm: Normal rate and regular rhythm  Heart sounds: Normal heart sounds  No murmur heard  No gallop  Pulmonary:      Effort: Pulmonary effort is normal  No respiratory distress  Breath sounds: Normal breath sounds  No stridor  No wheezing or rhonchi  Abdominal:      Palpations: Abdomen is soft  Skin:     General: Skin is warm and dry     Neurological:      Mental Status: He is alert and oriented to person, " place, and time     Psychiatric:         Behavior: Behavior normal          ECOG:       Emergency Contacts:    Extended Emergency Contact Information  Primary Emergency Contact: [address] Phone: [phone]  Relation: Spouse

## 2023-06-05 ENCOUNTER — NURSE TRIAGE (OUTPATIENT)
Dept: OTHER | Facility: OTHER | Age: 58
End: 2023-06-05

## 2023-06-05 RX ORDER — SODIUM CHLORIDE 9 MG/ML
125 INJECTION, SOLUTION INTRAVENOUS CONTINUOUS
Status: CANCELLED | OUTPATIENT
Start: 2023-06-05

## 2023-06-06 NOTE — TELEPHONE ENCOUNTER
"  Reason for Disposition  • Bowel prep for colonoscopy, questions about    Answer Assessment - Initial Assessment Questions  1  DATE/TIME: \"When did you have your colonoscopy? \"       Wednesday  2  MAIN CONCERN: Conception Blase is your main concern right now? \" \"What questions do you have? \"      What should I mix the mirlax with?     Protocols used: COLONOSCOPY SYMPTOMS AND QUESTIONS-ADULT-[facility]    "

## 2023-06-06 NOTE — TELEPHONE ENCOUNTER
Patient was advised to mix his mirlax with gatorade that is NOT red, purple or orange in color  Patient verbalized understanding

## 2023-06-06 NOTE — TELEPHONE ENCOUNTER
Regarding: Question regarding colonoscopy prep  ----- Message from Sumeet Antonio sent at 6/5/2023  8:58 PM EDT -----  '' I have question regarding my colonoscopy prep ''

## 2023-06-07 ENCOUNTER — ANESTHESIA (OUTPATIENT)
Dept: GASTROENTEROLOGY | Facility: MEDICAL CENTER | Age: 58
End: 2023-06-07

## 2023-06-07 ENCOUNTER — ANESTHESIA EVENT (OUTPATIENT)
Dept: GASTROENTEROLOGY | Facility: MEDICAL CENTER | Age: 58
End: 2023-06-07

## 2023-06-07 ENCOUNTER — HOSPITAL ENCOUNTER (OUTPATIENT)
Dept: GASTROENTEROLOGY | Facility: MEDICAL CENTER | Age: 58
Setting detail: OUTPATIENT SURGERY
Discharge: HOME | End: 2023-06-07
Payer: COMMERCIAL

## 2023-06-07 VITALS
RESPIRATION RATE: 16 BRPM | HEART RATE: 70 BPM | WEIGHT: 205 LBS | HEIGHT: 67 IN | TEMPERATURE: 97 F | OXYGEN SATURATION: 98 % | BODY MASS INDEX: 32.18 KG/M2 | SYSTOLIC BLOOD PRESSURE: 108 MMHG | DIASTOLIC BLOOD PRESSURE: 72 MMHG

## 2023-06-07 DIAGNOSIS — K29.70 GASTRITIS DETERMINED BY ENDOSCOPY: Primary | ICD-10-CM

## 2023-06-07 DIAGNOSIS — D51.0 PERNICIOUS ANEMIA: ICD-10-CM

## 2023-06-07 DIAGNOSIS — E53.8 VITAMIN B12 DEFICIENCY (NON ANEMIC): ICD-10-CM

## 2023-06-07 DIAGNOSIS — Z12.11 SCREENING FOR COLON CANCER: ICD-10-CM

## 2023-06-07 PROCEDURE — 88341 IMHCHEM/IMCYTCHM EA ADD ANTB: CPT | Performed by: SPECIALIST

## 2023-06-07 PROCEDURE — 88342 IMHCHEM/IMCYTCHM 1ST ANTB: CPT | Performed by: SPECIALIST

## 2023-06-07 PROCEDURE — 88305 TISSUE EXAM BY PATHOLOGIST: CPT | Performed by: SPECIALIST

## 2023-06-07 RX ORDER — PROPOFOL 10 MG/ML
INJECTION, EMULSION INTRAVENOUS AS NEEDED
Status: DISCONTINUED | OUTPATIENT
Start: 2023-06-07 | End: 2023-06-07

## 2023-06-07 RX ORDER — OMEPRAZOLE 40 MG/1
40 CAPSULE, DELAYED RELEASE ORAL DAILY
Qty: 90 CAPSULE | Refills: 0 | Status: SHIPPED | OUTPATIENT
Start: 2023-06-07 | End: 2023-09-05

## 2023-06-07 RX ORDER — SODIUM CHLORIDE 9 MG/ML
125 INJECTION, SOLUTION INTRAVENOUS CONTINUOUS
Status: DISCONTINUED | OUTPATIENT
Start: 2023-06-07 | End: 2023-06-11 | Stop reason: HOSPADM

## 2023-06-07 RX ADMIN — Medication 40 MG: at 07:50

## 2023-06-07 RX ADMIN — PROPOFOL 100 MG: 10 INJECTION, EMULSION INTRAVENOUS at 07:49

## 2023-06-07 RX ADMIN — PROPOFOL 100 MG: 10 INJECTION, EMULSION INTRAVENOUS at 07:46

## 2023-06-07 RX ADMIN — SODIUM CHLORIDE 125 ML/HR: 0.9 INJECTION, SOLUTION INTRAVENOUS at 07:10

## 2023-06-07 RX ADMIN — PROPOFOL 100 MG: 10 INJECTION, EMULSION INTRAVENOUS at 07:38

## 2023-06-07 RX ADMIN — PROPOFOL 200 MG: 10 INJECTION, EMULSION INTRAVENOUS at 07:35

## 2023-06-07 NOTE — ANESTHESIA PREPROCEDURE EVALUATION
Procedure:  COLONOSCOPY  EGD    Relevant Problems   CARDIO   (+) Hyperlipidemia      HEMATOLOGY   (+) Pernicious anemia      MUSCULOSKELETAL   (+) Localized primary osteoarthritis of lower leg, left   (+) OA (osteoarthritis) of finger   (+) Osteoarthritis      PULMONARY   (+) Asthma   (+) Obstructive sleep apnea syndrome   (+) Severe sleep apnea        Physical Exam    Airway    Mallampati score: II         Dental       Cardiovascular  Rhythm: regular, Rate: normal,     Pulmonary  Breath sounds clear to auscultation,     Other Findings        Anesthesia Plan  ASA Score- 2     Anesthesia Type- IV sedation with anesthesia with ASA Monitors  Additional Monitors:   Airway Plan:           Plan Factors-Exercise tolerance (METS): >4 METS  Chart reviewed  EKG reviewed  Existing labs reviewed  Patient summary reviewed  Patient is not a current smoker  Patient not instructed to abstain from smoking on day of procedure  Patient did not smoke on day of surgery  Obstructive sleep apnea risk education given perioperatively  Induction- intravenous  Postoperative Plan-     Informed Consent- Anesthetic plan and risks discussed with patient

## 2023-06-07 NOTE — ANESTHESIA POSTPROCEDURE EVALUATION
Post-Op Assessment Note    CV Status:  Stable  Pain Score: 1    Pain management: adequate     Mental Status:  Alert and awake   Hydration Status:  Euvolemic   PONV Controlled:  Controlled   Airway Patency:  Patent      Post Op Vitals Reviewed: Yes      Staff: Anesthesiologist         No notable events documented      BP 93/55 (06/07/23 0807)    Temp     Pulse 66 (06/07/23 0807)   Resp 16 (06/07/23 0807)    SpO2 99 % (06/07/23 0807)

## 2023-06-07 NOTE — H&P
History and Physical - [facility]  Billy Salas 59 y o  male MRN: [id number]                  HPI: iBlly Salas is a 59y o  year old male who presents for EGD and colonoscopy      REVIEW OF SYSTEMS: Per the HPI, and otherwise unremarkable  Historical Information   Past Medical History:   Diagnosis Date   • Anxiety    • Arthritis    • Depression    • Diabetes mellitus (HCC)     NIDDM   • Hyperlipidemia    • Lumbar disc disorder     left leg and foot  numbness   • Meniscus tear    • Other specified joint disorders, right knee    • Seasonal allergies    • Trigger finger, left index finger     and right     Past Surgical History:   Procedure Laterality Date   • ARTHROSCOPY KNEE Left 2/1/2016    Procedure: ARTHROSCOPY KNEE;  Surgeon: Farida Haywood MD;  [facility];  Service:    • ARTHROSCOPY KNEE Right 12/21/2017    Procedure: KNEE ARTHROSCOPY; DECOMPRESSION OF ACL CYST, PARTIAL MEDIAL MENISECTOMY;  Surgeon: Mulu Orr MD;  [facility];  Service: Orthopedics   • COLONOSCOPY  2016   • FRACTURE SURGERY Right     leg   • HERNIA REPAIR      umbilical    • DE ARTHRS KNE SURG W/MENISCECTOMY MED/LAT W/SHVG Left 2/1/2016    Procedure: PARTIAL MENISCECTOMY MEDIAL, SYNOCECTOMY, CHONDROPLASTY;  Surgeon: Farida Haywood MD;  [facility];  Service: Orthopedics   • DE COLONOSCOPY FLX DX W/COLLJ SPEC WHEN PFRMD N/A 8/18/2016    Procedure: COLONOSCOPY;  Surgeon: Windy Dominguez MD;  [facility];   Service: Gastroenterology   • DE TENDON SHEATH INCISION Left 2/6/2018    Procedure: INDEX FINGER TRIGGER RELEASE; LEFT LONG, RING, and SMALL FINGER INJECTIONS;  Surgeon: Lubna Mike MD;  [facility];  Service: Orthopedics     Social History   Social History     Substance and Sexual Activity   Alcohol Use Yes    Comment: few x year     Social History     Substance and Sexual Activity   Drug Use No     Social History     Tobacco Use   Smoking "Status Never   Smokeless Tobacco Never     Family History   Problem Relation Age of Onset   • Diabetes Mother    • Kidney disease Mother    • Diabetes Father        Meds/Allergies     (Not in a hospital admission)      Allergies   Allergen Reactions   • Other Other (See Comments)     Seasonal - nasal congestion  MONOCRYL - SUTURE       Objective     Blood pressure 127/76, pulse 74, temperature (!) 97 °F (36 1 °C), resp  rate 18, height 5' 7\" (1 702 m), weight 93 kg (205 lb), SpO2 97 %        PHYSICAL EXAM    Gen: NAD  CV: RRR  CHEST: Clear  ABD: soft, NT/ND  EXT: no edema      ASSESSMENT/PLAN:  This is a 59y o  year old male here for EGD and colonoscopy          "

## 2023-06-12 PROCEDURE — 88305 TISSUE EXAM BY PATHOLOGIST: CPT | Performed by: SPECIALIST

## 2023-06-12 PROCEDURE — 88341 IMHCHEM/IMCYTCHM EA ADD ANTB: CPT | Performed by: SPECIALIST

## 2023-06-12 PROCEDURE — 88342 IMHCHEM/IMCYTCHM 1ST ANTB: CPT | Performed by: SPECIALIST

## 2023-06-27 ENCOUNTER — OFFICE VISIT (OUTPATIENT)
Dept: DENTISTRY | Facility: CLINIC | Age: 58
End: 2023-06-27

## 2023-06-27 VITALS — SYSTOLIC BLOOD PRESSURE: 115 MMHG | DIASTOLIC BLOOD PRESSURE: 78 MMHG | HEART RATE: 76 BPM

## 2023-06-27 DIAGNOSIS — Z01.21 ENCOUNTER FOR DENTAL EXAMINATION AND CLEANING WITH ABNORMAL FINDINGS: Primary | ICD-10-CM

## 2023-06-27 PROCEDURE — D0120 PERIODIC ORAL EVALUATION - ESTABLISHED PATIENT: HCPCS

## 2023-06-27 PROCEDURE — D1110 PROPHYLAXIS - ADULT: HCPCS

## 2023-06-27 PROCEDURE — D0274 BITEWINGS - 4 RADIOGRAPHIC IMAGES: HCPCS

## 2023-06-27 NOTE — DENTAL PROCEDURE DETAILS
Rex Moreno presents for a Periodic exam w/ Dr Sandra Martin  Verbal consent for treatment given in addition to the forms  Reviewed health history - Patient is ASA II  Consents signed: Yes     Perio: 2A  Pain Scale: 0  Caries Assessment: High  Radiographs: Bitewings x4     Oral Hygiene instruction reviewed and given    Recommended 6month Hygiene recall visits with the [id number]

## 2023-06-27 NOTE — DENTAL PROCEDURE DETAILS
Prophylaxis completed with ultrasonic  and hand instrumentation  Soft plaque removed and supragingival calculus removed  Polished with prophy cup and paste  Flossed and provided Oral Health Instructions  Demonstrated proper brushing and flossing technique  Patient left satisfied and ambulatory  PERIODIC EXAM, ADULT PROPHY AND 4 BWX     REVIEWED MED HX: meds, allergies, health changes reviewed in EPIC  CHIEF CONCERN:  none   PAIN SCALE:  0  ASA CLASS:  II  PLAQUE:  mild    CALCULUS:  moderate generalized gritty calculus  BLEEDING:  moderate generalized  STAIN :   moderate generalized  ORAL HYGIENE:  good    fair   poor/needs improvement  PERIO: 2A  Generalized abrasion and occlusal wear due to hx pf grinding/clenching  Hand scaled, polished and flossed  Used Cavitron  Oral Hygiene Instruction:  recommended brushing 2 x daily for 2 minutes MIN, recommended flossing daily, reviewed dietary precautions  Visual and Tactile Intraoral/ Extraoral evaluation: Oral and Oropharyngeal cancer evaluation  No findings     Dr Max Fisher exam=   Reviewed with patient clinical and radiographic findings and patient verbalized understanding  All questions and concerns addressed       REFERRALS: no referrals needed    CARIES FINDINGS:   3 OL  14 OL  16 O  18 OB  19 OB  30 OB  31 O       TREATMENT  PLAN :   1) 3 OL   2) cont w/ restorations    Next Recall: 6 month recall     Last BWX: 6-27-23  Last FMX :  6-2-22

## 2023-08-30 ENCOUNTER — OFFICE VISIT (OUTPATIENT)
Dept: HEMATOLOGY ONCOLOGY | Facility: CLINIC | Age: 58
End: 2023-08-30
Payer: COMMERCIAL

## 2023-08-30 VITALS
HEIGHT: 67 IN | SYSTOLIC BLOOD PRESSURE: 110 MMHG | DIASTOLIC BLOOD PRESSURE: 78 MMHG | BODY MASS INDEX: 32.58 KG/M2 | HEART RATE: 87 BPM | OXYGEN SATURATION: 95 % | RESPIRATION RATE: 17 BRPM | WEIGHT: 207.6 LBS | TEMPERATURE: 97.4 F

## 2023-08-30 DIAGNOSIS — D51.0 PERNICIOUS ANEMIA: ICD-10-CM

## 2023-08-30 DIAGNOSIS — E53.8 B12 DEFICIENCY: Primary | ICD-10-CM

## 2023-08-30 PROCEDURE — 99214 OFFICE O/P EST MOD 30 MIN: CPT | Performed by: PHYSICIAN ASSISTANT

## 2023-08-30 NOTE — PROGRESS NOTES
Hematology/Oncology Outpatient Follow- up Note  Aubrey Sher 59 y.o. male MRN: @ Encounter: [id number]        Date:  8/30/2023      Assessment / Plan:    Pernicious anemia. B12 deficiency without evidence of anemia diagnosed 5/22.   + Anti-parietal antibody elevated at 36.8     6/7/23 EGD and colonoscopy  -mild erythematous mucosa in the body of the stomach and antrum. Polyps removed from the colon. Intestinal metaplasia which is a precancerous lesion noted. Repeat EGD requested in 1 year. 5-year recall for colonoscopy was requested        He had been receiving B12 IM monthly.  Advised to increase his B12 administration IM to every other week at his 12/13/22 office visit.    B12 improved further.          AT his 5/30/23 visit, B12 IM D/C'd. Sublingual  B12 1000 mcg sublingual daily requested. 8/31/23- B12 900. continue B12 SL 1000mcg daily. F/U with PCP. No F/U needed in our office at this time.        Weakly + TTG Ab of 8 5/2023. Remainder of celiac disease antibody panel normal.  Patient is not experiencing any abdominal cramping nor diarrhea.        Hypoferremia.  Ferritin 15 6/2021.   5/20/23 ferritin 14;  Iron saturation 36%. MVI with iron.     +FELECIA 1:40 in speckled pattern identified in 2018.  Negative FELECIA 1/9/23.                    HPI:  Saw Bernal is a 59 y. o. man of [facility] descent seen for initial consultation 12/8/2022 at the referral of NANCY Finley regarding low B12.     Colonoscopy August 2016 by Dr. Corie Daniel internal hemorrhoids.  Polyps identified which were removed diverticulosis noted.   (initial recommendation was 5 years however after pathology came back with hyperplastic polyps recommendation was changed to 10 years.        In August 2018.  Lab work identified positive FELECIA 1: 40 in a speckled pattern.  Rheumatoid factor, CCP positive.  14.3.3 ETA protein test negative     Patient reports he did have some arthralgias and tingling which prompted assessment for B12 deficiency.                B12      MMA    Ferritin  2/02     420                  86  5/22     149      862  7/22     481      85  11/22   299      486  12/22   316      232     Folate >20     Patient reported that since he is on B12, his joint pain and tingling in his feet have improved.  He eats a well-balanced diet with meat and vegetables.  Denies any history of GI surgery.  Denies any increased bruising or bleeding.  No chest pain, shortness of breath or cough. Denies any nausea, vomiting, early satiety, diarrhea or constipation.  No change with bowel movements.     1/9/23 CBCD normal; B12 improved to 428.  Anti-parietal antibody elevated at 36.8 (ULN 20)  Normal intrinsic factor antibody.  CRP, FELECIA are normal  CMP stable with elevated glucose    Interval History:    Pernicious anemia. B12 deficiency without evidence of anemia diagnosed 5/22.   + Anti-parietal antibody elevated at 36.8     6/7/23 EGD and colonoscopy  -mild erythematous mucosa in the body of the stomach and antrum. Polyps removed from the colon. Intestinal metaplasia which is a precancerous lesion noted. Repeat EGD requested in 1 year. 5-year recall for colonoscopy was requested      Review of Systems   Constitutional: Negative for appetite change, chills, diaphoresis, fatigue, fever and unexpected weight change. HENT:   Negative for mouth sores, nosebleeds, sore throat, tinnitus and voice change. Eyes: Negative for eye problems. Respiratory: Negative for chest tightness, cough, shortness of breath and wheezing. Cardiovascular: Negative for chest pain, leg swelling and palpitations. Gastrointestinal: Negative for abdominal distention, abdominal pain, blood in stool, constipation, diarrhea, nausea, rectal pain and vomiting. Endocrine: Negative for hot flashes. Genitourinary: Negative. Musculoskeletal: Negative for gait problem and myalgias. Skin: Negative for itching and rash. Neurological: Negative for dizziness, gait problem, headaches, light-headedness and numbness. Hematological: Negative for adenopathy. Psychiatric/Behavioral: Negative for confusion and sleep disturbance. The patient is not nervous/anxious. Test Results:        Labs:   Lab Results   Component Value Date    HGB 15.2 05/20/2023    HCT 45.3 05/20/2023    MCV 86 05/20/2023     05/20/2023    WBC 5.08 05/20/2023    NRBC 0 01/09/2023     Lab Results   Component Value Date     11/06/2017    K 4.3 01/09/2023     01/09/2023    CO2 31 01/09/2023    ANIONGAP 5 08/03/2015    BUN 17 01/09/2023    CREATININE 0.84 01/09/2023    GLUCOSE 109 08/03/2015    GLUF 127 (H) 01/09/2023    CALCIUM 9.4 01/09/2023    CORRECTEDCA 9.7 11/08/2022    AST 16 01/09/2023    ALT 30 01/09/2023    ALKPHOS 76 01/09/2023    PROT 6.6 05/09/2017    BILITOT 0.5 05/09/2017    EGFR 95 01/09/2023           Imaging: No results found. Allergies: Allergies   Allergen Reactions   • Other Other (See Comments)     Seasonal - nasal congestion  MONOCRYL - SUTURE     Current Medications: Reviewed  PMH/FH/SH:  Reviewed      Physical Exam:    There is no height or weight on file to calculate BSA. Ht Readings from Last 3 Encounters:   06/07/23 5' 7" (1.702 m)   05/30/23 5' 7" (1.702 m)   03/01/23 5' 7" (1.702 m)        Wt Readings from Last 3 Encounters:   06/07/23 93 kg (205 lb)   05/30/23 94.8 kg (209 lb)   03/01/23 94.8 kg (209 lb)        Temp Readings from Last 3 Encounters:   06/07/23 (!) 97 °F (36.1 °C)   05/30/23 97.6 °F (36.4 °C) (Temporal)   04/11/23 (!) 96.2 °F (35.7 °C)        BP Readings from Last 3 Encounters:   06/27/23 115/78   06/07/23 108/72   05/30/23 110/78             Physical Exam  Constitutional:       Appearance: He is well-developed. HENT:      Head: Normocephalic and atraumatic. Cardiovascular:      Rate and Rhythm: Normal rate.    Pulmonary:      Effort: Pulmonary effort is normal. No respiratory distress. Abdominal:      Palpations: Abdomen is soft. Skin:     General: Skin is warm and dry. Neurological:      Mental Status: He is alert and oriented to person, place, and time.    Psychiatric:         Behavior: Behavior normal.           Emergency Contacts:    Extended Emergency Contact Information  Primary Emergency Contact: [address] Phone: [phone]  Relation: Spouse

## 2023-08-31 ENCOUNTER — TELEPHONE (OUTPATIENT)
Dept: HEMATOLOGY ONCOLOGY | Facility: CLINIC | Age: 58
End: 2023-08-31

## 2023-08-31 ENCOUNTER — APPOINTMENT (OUTPATIENT)
Dept: LAB | Facility: CLINIC | Age: 58
End: 2023-08-31
Payer: COMMERCIAL

## 2023-08-31 DIAGNOSIS — D51.0 PERNICIOUS ANEMIA: ICD-10-CM

## 2023-08-31 DIAGNOSIS — E53.8 B12 DEFICIENCY: ICD-10-CM

## 2023-08-31 LAB
BASOPHILS # BLD AUTO: 0.04 THOUSANDS/ÂΜL (ref 0–0.1)
BASOPHILS NFR BLD AUTO: 1 % (ref 0–1)
EOSINOPHIL # BLD AUTO: 0.29 THOUSAND/ÂΜL (ref 0–0.61)
EOSINOPHIL NFR BLD AUTO: 5 % (ref 0–6)
ERYTHROCYTE [DISTWIDTH] IN BLOOD BY AUTOMATED COUNT: 13.7 % (ref 11.6–15.1)
HCT VFR BLD AUTO: 45.4 % (ref 36.5–49.3)
HGB BLD-MCNC: 14.9 G/DL (ref 12–17)
IMM GRANULOCYTES # BLD AUTO: 0.01 THOUSAND/UL (ref 0–0.2)
IMM GRANULOCYTES NFR BLD AUTO: 0 % (ref 0–2)
LYMPHOCYTES # BLD AUTO: 1.97 THOUSANDS/ÂΜL (ref 0.6–4.47)
LYMPHOCYTES NFR BLD AUTO: 36 % (ref 14–44)
MCH RBC QN AUTO: 29.1 PG (ref 26.8–34.3)
MCHC RBC AUTO-ENTMCNC: 32.8 G/DL (ref 31.4–37.4)
MCV RBC AUTO: 89 FL (ref 82–98)
MONOCYTES # BLD AUTO: 0.44 THOUSAND/ÂΜL (ref 0.17–1.22)
MONOCYTES NFR BLD AUTO: 8 % (ref 4–12)
NEUTROPHILS # BLD AUTO: 2.73 THOUSANDS/ÂΜL (ref 1.85–7.62)
NEUTS SEG NFR BLD AUTO: 50 % (ref 43–75)
NRBC BLD AUTO-RTO: 0 /100 WBCS
PLATELET # BLD AUTO: 215 THOUSANDS/UL (ref 149–390)
PMV BLD AUTO: 10 FL (ref 8.9–12.7)
RBC # BLD AUTO: 5.12 MILLION/UL (ref 3.88–5.62)
VIT B12 SERPL-MCNC: 918 PG/ML (ref 180–914)
WBC # BLD AUTO: 5.48 THOUSAND/UL (ref 4.31–10.16)

## 2023-08-31 PROCEDURE — 82607 VITAMIN B-12: CPT

## 2023-08-31 PROCEDURE — 85025 COMPLETE CBC W/AUTO DIFF WBC: CPT

## 2023-08-31 PROCEDURE — 36415 COLL VENOUS BLD VENIPUNCTURE: CPT

## 2023-09-01 ENCOUNTER — TELEPHONE (OUTPATIENT)
Dept: HEMATOLOGY ONCOLOGY | Facility: CLINIC | Age: 58
End: 2023-09-01

## 2023-09-01 NOTE — TELEPHONE ENCOUNTER
Received from voice mail:    "Good morning. This is Woodanamaria Merino. I receive you now phone call from you from my doctor's office and you can you give me a call? My phone number is Donovan Gtuierrez versus [phone]. Thank you.  Bye."

## 2023-09-01 NOTE — TELEPHONE ENCOUNTER
Attempted to call patient again, no answer received. Communication consent does not state VM can be left. Will await return call to discuss lab results.

## 2023-09-14 ENCOUNTER — TELEPHONE (OUTPATIENT)
Dept: HEMATOLOGY ONCOLOGY | Facility: CLINIC | Age: 58
End: 2023-09-14

## 2023-09-14 NOTE — TELEPHONE ENCOUNTER
Spoke with wife, she requested I leave a message for the patient with the information. Attached recommendations left on patients VM. My call back number was provided.

## 2023-09-14 NOTE — TELEPHONE ENCOUNTER
----- Message from Traci Greenfield PA-C sent at 8/31/2023 11:00 AM EDT -----  Please let patient know to continue B12 SL 1000mcg daily. F/U with PCP. No F/U needed in our office at this time.

## 2024-01-02 ENCOUNTER — OFFICE VISIT (OUTPATIENT)
Dept: DENTISTRY | Facility: CLINIC | Age: 59
End: 2024-01-02

## 2024-01-02 VITALS — SYSTOLIC BLOOD PRESSURE: 105 MMHG | HEART RATE: 75 BPM | DIASTOLIC BLOOD PRESSURE: 73 MMHG

## 2024-01-02 DIAGNOSIS — Z01.21 ENCOUNTER FOR DENTAL EXAMINATION AND CLEANING WITH ABNORMAL FINDINGS: Primary | ICD-10-CM

## 2024-01-02 PROCEDURE — D0120 PERIODIC ORAL EVALUATION - ESTABLISHED PATIENT: HCPCS

## 2024-01-02 PROCEDURE — D1110 PROPHYLAXIS - ADULT: HCPCS

## 2024-01-02 NOTE — DENTAL PROCEDURE DETAILS
Prophylaxis completed with ultrasonic  and hand instrumentation.  Soft plaque removed and supragingival calculus removed from all quads.  Polished with prophy cup and paste.  Flossed and provided Oral Health Instructions.  Demonstrated proper brushing and flossing technique.  Patient left satisfied and ambulatory.         PERIODIC EXAM, ADULT PROPHY    REVIEWED MED HX: meds, allergies, health changes reviewed in [facility]. All consents signed.  CHIEF CONCERN:  none   PAIN SCALE:  0  ASA CLASS:  II  PLAQUE:  moderate - gen  CALCULUS:   moderate - gen  BLEEDING:   moderate    STAIN :   moderate     ORAL HYGIENE:  fair   PERIO: 2 A Generalized abrasion and occlusal wear due to history of grinding and clenching.     Hand scaled, polished and flossed. Used Cavitron.     Oral Hygiene Instruction:  recommended brushing 2 x daily for 2 minutes MIN, recommended flossing daily, reviewed dietary precautions.    Dispensed: toothbrush, toothpaste and floss    Visual and Tactile Intraoral/ Extraoral evaluation: Oral and Oropharyngeal cancer evaluation. No findings     Dr. Lyle  exam=   Reviewed with patient clinical and radiographic findings and patient verbalized understanding. All questions and concerns addressed.   Photo taken of a aphthous ulcer located on the right ventricle surface of the tongue.  See attached for photo.    REFERRALS: no referrals needed    CARIES FINDINGS:   #16-O  #18-B  #19-B  #31-Crown needed       TREATMENT  PLAN :   1) #18 and #19 B resins  2) #16-O resin  3) #31-Crown    Next Recall: 6 month recall with periodic exam and 4 BWX    Last BWX: 6/27/2023  Last FMX : 6/2/2022

## 2024-01-03 ENCOUNTER — OFFICE VISIT (OUTPATIENT)
Dept: DENTISTRY | Facility: CLINIC | Age: 59
End: 2024-01-03

## 2024-01-03 VITALS — HEART RATE: 84 BPM | DIASTOLIC BLOOD PRESSURE: 83 MMHG | SYSTOLIC BLOOD PRESSURE: 128 MMHG

## 2024-01-03 DIAGNOSIS — K02.9 DENTAL CARIES: Primary | ICD-10-CM

## 2024-01-03 PROCEDURE — D2391 RESIN-BASED COMPOSITE - 1 SURFACE, POSTERIOR: HCPCS

## 2024-01-03 NOTE — DENTAL PROCEDURE DETAILS
Patient presents for a dental restoration and verbally consents for treatment:  Reviewed health history-  Pt is ASA type II  Treatment consents signed: Yes  Perio: Gingivitis  Pain Scale: 0  Caries Assessment: Medium    Radiographs: Films are current    Reviewed treatment planned resin restorations. #18,19 B pit not carious, just stained. Informed pt we will monitor progression at recall visits to see if caries develops and progresses before doing resin restorations there. Pt understood. #16 O confirmed caries deep enough warranting restoration.     [place] on #31 warranted due to large size of composite restoration exhibiting shadowing underneath and heavy occlusal wear.    Patient agrees with the diagnosis of Caries and the proposed treatment plan for the resin restoration:  Tooth ##16 Surface: O  Dental Anesthesia: Pt requested none to be used due to small size of lesion.    Dry angle and gauze isolation.   Prepared ideal class 1 prep using 330 bur on high speed. All caries removed with ideal prep.   Material: Acid etch and rinse, Ivoclar portillo scrubbed in, air thinned and cured, and Tetric evoflow resin placed and cured   Shade: A2    Checked occlusion as satisfactory  Prognosis is Good.   Referrals Needed: No  Next visit: core build up and crown #31

## 2024-01-04 ENCOUNTER — APPOINTMENT (OUTPATIENT)
Dept: LAB | Facility: CLINIC | Age: 59
End: 2024-01-04
Payer: COMMERCIAL

## 2024-01-04 DIAGNOSIS — E06.1 SUBACUTE THYROIDITIS: ICD-10-CM

## 2024-01-04 DIAGNOSIS — D51.0 VITAMIN B12 DEFICIENCY ANEMIA DUE TO INTRINSIC FACTOR DEFICIENCY: ICD-10-CM

## 2024-01-04 DIAGNOSIS — E78.5 HYPERLIPIDEMIA, UNSPECIFIED HYPERLIPIDEMIA TYPE: ICD-10-CM

## 2024-01-04 DIAGNOSIS — E53.8 VITAMIN B 12 DEFICIENCY: ICD-10-CM

## 2024-01-04 LAB
ALBUMIN SERPL BCP-MCNC: 4.1 G/DL (ref 3.5–5)
ALP SERPL-CCNC: 63 U/L (ref 34–104)
ALT SERPL W P-5'-P-CCNC: 27 U/L (ref 7–52)
ANION GAP SERPL CALCULATED.3IONS-SCNC: 4 MMOL/L
AST SERPL W P-5'-P-CCNC: 22 U/L (ref 13–39)
BILIRUB SERPL-MCNC: 0.79 MG/DL (ref 0.2–1)
BUN SERPL-MCNC: 14 MG/DL (ref 5–25)
CALCIUM SERPL-MCNC: 9.5 MG/DL (ref 8.4–10.2)
CHLORIDE SERPL-SCNC: 104 MMOL/L (ref 96–108)
CHOLEST SERPL-MCNC: 239 MG/DL
CO2 SERPL-SCNC: 31 MMOL/L (ref 21–32)
CREAT SERPL-MCNC: 0.83 MG/DL (ref 0.6–1.3)
ERYTHROCYTE [DISTWIDTH] IN BLOOD BY AUTOMATED COUNT: 14 % (ref 11.6–15.1)
GFR SERPL CREATININE-BSD FRML MDRD: 95 ML/MIN/1.73SQ M
GLUCOSE P FAST SERPL-MCNC: 122 MG/DL (ref 65–99)
HCT VFR BLD AUTO: 45.2 % (ref 36.5–49.3)
HDLC SERPL-MCNC: 49 MG/DL
HGB BLD-MCNC: 15.2 G/DL (ref 12–17)
LDLC SERPL CALC-MCNC: 165 MG/DL (ref 0–100)
MCH RBC QN AUTO: 29.2 PG (ref 26.8–34.3)
MCHC RBC AUTO-ENTMCNC: 33.6 G/DL (ref 31.4–37.4)
MCV RBC AUTO: 87 FL (ref 82–98)
NONHDLC SERPL-MCNC: 190 MG/DL
PLATELET # BLD AUTO: 231 THOUSANDS/UL (ref 149–390)
PMV BLD AUTO: 10.2 FL (ref 8.9–12.7)
POTASSIUM SERPL-SCNC: 4.4 MMOL/L (ref 3.5–5.3)
PROT SERPL-MCNC: 7.1 G/DL (ref 6.4–8.4)
RBC # BLD AUTO: 5.2 MILLION/UL (ref 3.88–5.62)
SODIUM SERPL-SCNC: 139 MMOL/L (ref 135–147)
TRIGL SERPL-MCNC: 126 MG/DL
TSH SERPL DL<=0.05 MIU/L-ACNC: 2.15 UIU/ML (ref 0.45–4.5)
VIT B12 SERPL-MCNC: 829 PG/ML (ref 180–914)
WBC # BLD AUTO: 5.41 THOUSAND/UL (ref 4.31–10.16)

## 2024-01-04 PROCEDURE — 83918 ORGANIC ACIDS TOTAL QUANT: CPT

## 2024-01-04 PROCEDURE — 85027 COMPLETE CBC AUTOMATED: CPT

## 2024-01-04 PROCEDURE — 80053 COMPREHEN METABOLIC PANEL: CPT

## 2024-01-04 PROCEDURE — 84443 ASSAY THYROID STIM HORMONE: CPT

## 2024-01-04 PROCEDURE — 82607 VITAMIN B-12: CPT

## 2024-01-04 PROCEDURE — 80061 LIPID PANEL: CPT

## 2024-01-04 PROCEDURE — 36415 COLL VENOUS BLD VENIPUNCTURE: CPT

## 2024-01-09 LAB — METHYLMALONATE SERPL-SCNC: 116 NMOL/L (ref 0–378)

## 2024-04-02 ENCOUNTER — APPOINTMENT (OUTPATIENT)
Dept: LAB | Facility: CLINIC | Age: 59
End: 2024-04-02
Payer: COMMERCIAL

## 2024-04-02 ENCOUNTER — TRANSCRIBE ORDERS (OUTPATIENT)
Dept: LAB | Facility: CLINIC | Age: 59
End: 2024-04-02

## 2024-04-02 DIAGNOSIS — R73.03 PREDIABETES: ICD-10-CM

## 2024-04-02 DIAGNOSIS — E53.8 DEFICIENCY OF OTHER SPECIFIED B GROUP VITAMINS: ICD-10-CM

## 2024-04-02 DIAGNOSIS — E53.8 DEFICIENCY OF OTHER SPECIFIED B GROUP VITAMINS: Primary | ICD-10-CM

## 2024-04-02 DIAGNOSIS — E78.5 HYPERLIPIDEMIA, UNSPECIFIED HYPERLIPIDEMIA TYPE: ICD-10-CM

## 2024-04-02 LAB
CHOLEST SERPL-MCNC: 223 MG/DL
EST. AVERAGE GLUCOSE BLD GHB EST-MCNC: 137 MG/DL
HBA1C MFR BLD: 6.4 %
HDLC SERPL-MCNC: 51 MG/DL
LDLC SERPL CALC-MCNC: 141 MG/DL (ref 0–100)
NONHDLC SERPL-MCNC: 172 MG/DL
TRIGL SERPL-MCNC: 153 MG/DL
VIT B12 SERPL-MCNC: 733 PG/ML (ref 180–914)

## 2024-04-02 PROCEDURE — 36415 COLL VENOUS BLD VENIPUNCTURE: CPT

## 2024-04-02 PROCEDURE — 83036 HEMOGLOBIN GLYCOSYLATED A1C: CPT

## 2024-04-02 PROCEDURE — 82607 VITAMIN B-12: CPT

## 2024-04-02 PROCEDURE — 83918 ORGANIC ACIDS TOTAL QUANT: CPT

## 2024-04-02 PROCEDURE — 80061 LIPID PANEL: CPT

## 2024-04-09 LAB — METHYLMALONATE SERPL-SCNC: 125 NMOL/L (ref 0–378)

## 2024-05-29 ENCOUNTER — HOSPITAL ENCOUNTER (EMERGENCY)
Facility: HOSPITAL | Age: 59
Discharge: HOME | End: 2024-05-30
Attending: EMERGENCY MEDICINE | Admitting: EMERGENCY MEDICINE
Payer: COMMERCIAL

## 2024-05-29 VITALS
SYSTOLIC BLOOD PRESSURE: 134 MMHG | OXYGEN SATURATION: 96 % | TEMPERATURE: 97.6 F | RESPIRATION RATE: 18 BRPM | DIASTOLIC BLOOD PRESSURE: 74 MMHG | HEART RATE: 74 BPM

## 2024-05-29 DIAGNOSIS — S05.00XA CORNEAL ABRASION: Primary | ICD-10-CM

## 2024-05-29 PROCEDURE — 99282 EMERGENCY DEPT VISIT SF MDM: CPT

## 2024-05-29 PROCEDURE — 99284 EMERGENCY DEPT VISIT MOD MDM: CPT | Performed by: EMERGENCY MEDICINE

## 2024-05-29 RX ORDER — OFLOXACIN 3 MG/ML
1 SOLUTION/ DROPS OPHTHALMIC ONCE
Status: COMPLETED | OUTPATIENT
Start: 2024-05-29 | End: 2024-05-30

## 2024-05-29 RX ORDER — OFLOXACIN 3 MG/ML
1 SOLUTION/ DROPS OPHTHALMIC 4 TIMES DAILY
Qty: 5 ML | Refills: 0 | Status: SHIPPED | OUTPATIENT
Start: 2024-05-29

## 2024-05-29 RX ORDER — TETRACAINE HYDROCHLORIDE 5 MG/ML
1 SOLUTION OPHTHALMIC ONCE
Status: COMPLETED | OUTPATIENT
Start: 2024-05-29 | End: 2024-05-29

## 2024-05-29 RX ADMIN — FLUORESCEIN SODIUM 1 STRIP: 1 STRIP OPHTHALMIC at 22:49

## 2024-05-29 RX ADMIN — TETRACAINE HYDROCHLORIDE 1 DROP: 5 SOLUTION OPHTHALMIC at 22:49

## 2024-05-30 RX ADMIN — OFLOXACIN 1 DROP: 3 SOLUTION/ DROPS OPHTHALMIC at 00:07

## 2024-05-30 NOTE — DISCHARGE INSTRUCTIONS
Please use the ofloxacin drops 4 times per day for the next 3-5 days.     If you run out of the bottle given to you in the ED, you can  a refill at the pharmacy.    Please also use artificial tears to help with symptoms.    Please read the attached information for further guidance in home care and reasons to return to the ED.
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
## 2024-07-15 ENCOUNTER — APPOINTMENT (OUTPATIENT)
Dept: LAB | Facility: CLINIC | Age: 59
End: 2024-07-15
Payer: COMMERCIAL

## 2024-07-15 DIAGNOSIS — R73.03 DIABETES MELLITUS, LATENT: ICD-10-CM

## 2024-07-15 DIAGNOSIS — E53.8 BIOTIN-(PROPIONYL-COA-CARBOXYLASE) LIGASE DEFICIENCY: ICD-10-CM

## 2024-07-15 DIAGNOSIS — E78.49 OTHER HYPERLIPIDEMIA: ICD-10-CM

## 2024-07-15 LAB
BASOPHILS # BLD AUTO: 0.04 THOUSANDS/ÂΜL (ref 0–0.1)
BASOPHILS NFR BLD AUTO: 1 % (ref 0–1)
CHOLEST SERPL-MCNC: 214 MG/DL
EOSINOPHIL # BLD AUTO: 0.2 THOUSAND/ÂΜL (ref 0–0.61)
EOSINOPHIL NFR BLD AUTO: 4 % (ref 0–6)
ERYTHROCYTE [DISTWIDTH] IN BLOOD BY AUTOMATED COUNT: 14.3 % (ref 11.6–15.1)
EST. AVERAGE GLUCOSE BLD GHB EST-MCNC: 140 MG/DL
HBA1C MFR BLD: 6.5 %
HCT VFR BLD AUTO: 43.9 % (ref 36.5–49.3)
HDLC SERPL-MCNC: 48 MG/DL
HGB BLD-MCNC: 14.9 G/DL (ref 12–17)
IMM GRANULOCYTES # BLD AUTO: 0.02 THOUSAND/UL (ref 0–0.2)
IMM GRANULOCYTES NFR BLD AUTO: 0 % (ref 0–2)
LDLC SERPL CALC-MCNC: 137 MG/DL (ref 0–100)
LYMPHOCYTES # BLD AUTO: 2.11 THOUSANDS/ÂΜL (ref 0.6–4.47)
LYMPHOCYTES NFR BLD AUTO: 38 % (ref 14–44)
MCH RBC QN AUTO: 30 PG (ref 26.8–34.3)
MCHC RBC AUTO-ENTMCNC: 33.9 G/DL (ref 31.4–37.4)
MCV RBC AUTO: 89 FL (ref 82–98)
MONOCYTES # BLD AUTO: 0.48 THOUSAND/ÂΜL (ref 0.17–1.22)
MONOCYTES NFR BLD AUTO: 9 % (ref 4–12)
NEUTROPHILS # BLD AUTO: 2.72 THOUSANDS/ÂΜL (ref 1.85–7.62)
NEUTS SEG NFR BLD AUTO: 48 % (ref 43–75)
NONHDLC SERPL-MCNC: 166 MG/DL
NRBC BLD AUTO-RTO: 0 /100 WBCS
PLATELET # BLD AUTO: 211 THOUSANDS/UL (ref 149–390)
PMV BLD AUTO: 9.8 FL (ref 8.9–12.7)
RBC # BLD AUTO: 4.96 MILLION/UL (ref 3.88–5.62)
TRIGL SERPL-MCNC: 145 MG/DL
VIT B12 SERPL-MCNC: 258 PG/ML (ref 180–914)
WBC # BLD AUTO: 5.57 THOUSAND/UL (ref 4.31–10.16)

## 2024-07-15 PROCEDURE — 83036 HEMOGLOBIN GLYCOSYLATED A1C: CPT

## 2024-07-15 PROCEDURE — 36415 COLL VENOUS BLD VENIPUNCTURE: CPT

## 2024-07-15 PROCEDURE — 82607 VITAMIN B-12: CPT

## 2024-07-15 PROCEDURE — 83918 ORGANIC ACIDS TOTAL QUANT: CPT

## 2024-07-15 PROCEDURE — 80061 LIPID PANEL: CPT

## 2024-07-15 PROCEDURE — 85025 COMPLETE CBC W/AUTO DIFF WBC: CPT

## 2024-07-18 LAB — METHYLMALONATE SERPL-SCNC: 220 NMOL/L (ref 0–378)

## 2024-08-07 ENCOUNTER — OFFICE VISIT (OUTPATIENT)
Dept: DENTISTRY | Facility: CLINIC | Age: 59
End: 2024-08-07

## 2024-08-07 VITALS — SYSTOLIC BLOOD PRESSURE: 116 MMHG | DIASTOLIC BLOOD PRESSURE: 73 MMHG | HEART RATE: 75 BPM

## 2024-08-07 DIAGNOSIS — Z01.21 ENCOUNTER FOR DENTAL EXAMINATION AND CLEANING WITH ABNORMAL FINDINGS: Primary | ICD-10-CM

## 2024-08-07 PROCEDURE — D0274 BITEWINGS - 4 RADIOGRAPHIC IMAGES: HCPCS

## 2024-08-07 PROCEDURE — D1330 ORAL HYGIENE INSTRUCTIONS: HCPCS

## 2024-08-07 PROCEDURE — D1110 PROPHYLAXIS - ADULT: HCPCS

## 2024-08-07 NOTE — DENTAL PROCEDURE DETAILS
Prophylaxis completed with ultrasonic  and hand instrumentation.  Soft plaque removed and supragingival calculus removed from all quads.  Polished with prophy cup and paste.  Flossed and provided Oral Health Instructions.  Demonstrated proper brushing and flossing technique.  Patient left satisfied and ambulatory.          ADULT PROPHY , 4 BWX   REVIEWED MED HX: meds, allergies, health changes reviewed in [facility]. All consents signed.  CHIEF CONCERN: Patient is pointing to Max ants and explaining that there is bleeding in the area.  PAIN SCALE:  0  ASA CLASS:  II  PLAQUE:  heavy  CALCULUS:  heavy  BLEEDING:   moderate  STAIN :   heavy      PERIO: Patient had very heavy supra and sub ging calc in all quads.   Suggested that patient return for a full perio charting/exam to evaluate the need for SRPs.    Hygiene Procedures:  Scaled, Polished, Flossed and Used Cavitron    Oral Hygiene Instruction: Brushing Minimum 2x daily for 2 minutes, daily flossing, Interproximal Brush, and Electric toothbrush    Dispensed: Toothbrush, Toothpaste, Floss, and Flossers    Visual and Tactile Intraoral/ Extraoral evaluation: Oral and Oropharyngeal cancer evaluation. No findings        TREATMENT  PLAN :   NV: Patient is returning for a periodic exam including perio charting to determine need for SRPs.    Last BWX: 8/7/2024  Last  FMX : 6/2/2022

## 2024-09-11 ENCOUNTER — TELEPHONE (OUTPATIENT)
Dept: UROLOGY | Facility: AMBULATORY SURGERY CENTER | Age: 59
End: 2024-09-11

## 2024-09-11 DIAGNOSIS — Z12.5 SCREENING FOR PROSTATE CANCER: Primary | ICD-10-CM

## 2024-09-11 NOTE — TELEPHONE ENCOUNTER
Spoke to patient requesting he get a PSA done prior to 9/24 appt with [facility].  This is a new patient appt and no PSA is found in his chart.  Patient aware order is in chart.  I informed patient of Date, time and gave address of [facility] appt for him.  I almaso reviewed precautions with him on false elevations of PSA (riding bike, ejaculation etc).   Patient said he will get it done.

## 2024-09-17 ENCOUNTER — APPOINTMENT (OUTPATIENT)
Dept: LAB | Facility: CLINIC | Age: 59
End: 2024-09-17
Payer: COMMERCIAL

## 2024-09-17 LAB — PSA SERPL-MCNC: 0.97 NG/ML (ref 0–4)

## 2024-09-17 PROCEDURE — 36415 COLL VENOUS BLD VENIPUNCTURE: CPT

## 2024-09-17 PROCEDURE — 84153 ASSAY OF PSA TOTAL: CPT

## 2024-09-24 ENCOUNTER — OFFICE VISIT (OUTPATIENT)
Dept: UROLOGY | Facility: AMBULATORY SURGERY CENTER | Age: 59
End: 2024-09-24

## 2024-09-24 VITALS
DIASTOLIC BLOOD PRESSURE: 78 MMHG | WEIGHT: 205.4 LBS | OXYGEN SATURATION: 96 % | BODY MASS INDEX: 32.24 KG/M2 | SYSTOLIC BLOOD PRESSURE: 114 MMHG | HEART RATE: 80 BPM | HEIGHT: 67 IN

## 2024-09-24 DIAGNOSIS — Z12.5 SCREENING FOR PROSTATE CANCER: ICD-10-CM

## 2024-09-24 DIAGNOSIS — N48.1 BALANITIS: Primary | ICD-10-CM

## 2024-09-24 RX ORDER — CLOTRIMAZOLE AND BETAMETHASONE DIPROPIONATE 10; .64 MG/G; MG/G
CREAM TOPICAL 2 TIMES DAILY
Qty: 45 G | Refills: 1 | Status: SHIPPED | OUTPATIENT
Start: 2024-09-24

## 2024-09-24 RX ORDER — AMMONIUM LACTATE 12 G/100G
CREAM TOPICAL
COMMUNITY

## 2024-09-24 RX ORDER — MONTELUKAST SODIUM 10 MG/1
TABLET ORAL
COMMUNITY

## 2024-09-24 NOTE — PROGRESS NOTES
9/24/2024      Assessment and Plan    58 y.o. male new patient to [facility] for urology    Screening for prostate cancer  Patient denies a family history of prostate cancer  Patient's most recent PSA was performed 9/17/2024 and found to be 0.975.  This is the patient's only PSA value, so we are unable to trend PSA values but the value is overall stable for given the patient's age.  LANA performed today.  Palpably benign prostate.  Refer to physical exam findings.  We discussed that the patient should repeat a PSA 1 year from his last and follow-up in the office at that time to undergo LANA.        Balanitis  Today, the patient notes dryness, irritation, and an itching sensation underneath the foreskin.  Patient states that this issue arose within the last few weeks after having sexual intercourse with his significant other.  Refer to physical exam findings for phallus and testicular exam.  We discussed that with the physical exam findings that the patient is likely experiencing a fungal infection underneath the foreskin.  We discussed that first-line treatment for this would be to trial a antifungal cream.  Patient will trial Lotrisone cream twice daily to the affected area underneath the skin.  We discussed that if this problem continues to persist following treatment, that he may need to consider undergoing a adult male circumcision procedure that will help decrease the risk for recurrence of the fungal infection.  The patient will trial Lotrisone cream twice daily and follow-up in the office in 3 months to reassess        History of Present Illness  Saw Bernal is a 58 y.o. male here for evaluation of prostate cancer screening.  Patient was referred to our practice by his PCP for routine prostate cancer screening.  Patient denies a family history of prostate cancer, kidney cancer, and bladder cancer.  Patient's most recent PSA was performed 9/17/2024 and found to be 0.975.  Today, the patient  "notes that he has been having dryness, irritation, and an itching sensation underneath his foreskin.  Patient notes that this started occurring after intercourse with his significant other.  The patient denies dysuria, hematuria, or flank pain.  Otherwise, the patient offers no other lower urinary tract complaints.  Patient is currently content with his voiding pattern and notes a good urinary stream, feelings of incomplete bladder emptying, and denies any worsening urinary frequency/urgency.        Review of Systems   Constitutional:  Negative for chills and fever.   HENT:  Negative for ear pain and sore throat.    Eyes:  Negative for pain and visual disturbance.   Respiratory:  Negative for cough and shortness of breath.    Cardiovascular:  Negative for chest pain and palpitations.   Gastrointestinal:  Negative for abdominal pain and vomiting.   Genitourinary:  Positive for penile pain. Negative for decreased urine volume, difficulty urinating, dysuria, flank pain, frequency, hematuria, penile swelling and urgency.   Musculoskeletal:  Negative for arthralgias and back pain.   Skin:  Negative for color change and rash.   Neurological:  Negative for seizures and syncope.   All other systems reviewed and are negative.               Vitals  Vitals:    09/24/24 0924   BP: 114/78   BP Location: Right arm   Patient Position: Sitting   Cuff Size: Adult   Pulse: 80   SpO2: 96%   Weight: 93.2 kg (205 lb 6.4 oz)   Height: 5' 7\" (1.702 m)       Physical Exam  Vitals reviewed.   Constitutional:       General: He is not in acute distress.     Appearance: Normal appearance. He is not ill-appearing.   HENT:      Head: Normocephalic and atraumatic.      Nose: Nose normal.   Eyes:      General: No scleral icterus.  Pulmonary:      Effort: No respiratory distress.   Abdominal:      General: Abdomen is flat. There is no distension.      Palpations: Abdomen is soft.      Tenderness: There is no abdominal tenderness.   Genitourinary:   "   Comments: Uncircumcised phallus, testes descended bilaterally and smooth without nodularity.  Underneath the foreskin the head of the phallus was dry and there was noted to be some discoloration with white patches along the shaft over the phallus.  LANA performed today.  Prostate estimated to be about 40 g and smooth bilaterally without nodularity or induration.  Musculoskeletal:         General: Normal range of motion.      Cervical back: Normal range of motion.   Skin:     General: Skin is warm.      Coloration: Skin is not jaundiced.   Neurological:      Mental Status: He is alert and oriented to person, place, and time.      Gait: Gait normal.   Psychiatric:         Mood and Affect: Mood normal.         Behavior: Behavior normal.           Past History  Past Medical History:   Diagnosis Date    Anxiety     Arthritis     Depression     Diabetes mellitus (HCC)     NIDDM    Hyperlipidemia     Lumbar disc disorder     left leg and foot  numbness    Meniscus tear     Other specified joint disorders, right knee     Seasonal allergies     Trigger finger, left index finger     and right     Social History     Socioeconomic History    Marital status: /Civil Union     Spouse name: None    Number of children: None    Years of education: None    Highest education level: None   Occupational History    Occupation: [employer]   Tobacco Use    Smoking status: Never    Smokeless tobacco: Never   Vaping Use    Vaping status: Never Used   Substance and Sexual Activity    Alcohol use: Yes     Comment: few x year    Drug use: No    Sexual activity: Never   Other Topics Concern    None   Social History Narrative    None     Social Determinants of Health     Financial Resource Strain: Not on file   Food Insecurity: Not on file   Transportation Needs: Not on file   Physical Activity: Not on file   Stress: Not on file   Social Connections: Not on file   Intimate Partner Violence: Not on file   Housing Stability: Not on file      Social History     Tobacco Use   Smoking Status Never   Smokeless Tobacco Never     Family History   Problem Relation Age of Onset    Diabetes Mother     Kidney disease Mother     Diabetes Father        The following portions of the patient's history were reviewed and updated as appropriate: allergies, current medications, past medical history, past social history, past surgical history and problem list.    Results  No results found for this or any previous visit (from the past 1 hour(s)).]  Lab Results   Component Value Date    PSA 0.975 09/17/2024     Lab Results   Component Value Date    GLUCOSE 109 08/03/2015    CALCIUM 9.5 01/04/2024     11/06/2017    K 4.4 01/04/2024    CO2 31 01/04/2024     01/04/2024    BUN 14 01/04/2024    CREATININE 0.83 01/04/2024     Lab Results   Component Value Date    WBC 5.57 07/15/2024    HGB 14.9 07/15/2024    HCT 43.9 07/15/2024    MCV 89 07/15/2024     07/15/2024

## 2024-09-24 NOTE — ASSESSMENT & PLAN NOTE
Patient denies a family history of prostate cancer  Patient's most recent PSA was performed 9/17/2024 and found to be 0.975.  This is the patient's only PSA value, so we are unable to trend PSA values but the value is overall stable for given the patient's age.  LANA performed today.  Palpably benign prostate.  Refer to physical exam findings.  We discussed that the patient should repeat a PSA 1 year from his last and follow-up in the office at that time to undergo LANA.

## 2024-09-24 NOTE — ASSESSMENT & PLAN NOTE
Today, the patient notes dryness, irritation, and an itching sensation underneath the foreskin.  Patient states that this issue arose within the last few weeks after having sexual intercourse with his significant other.  Refer to physical exam findings for phallus and testicular exam.  We discussed that with the physical exam findings that the patient is likely experiencing a fungal infection underneath the foreskin.  We discussed that first-line treatment for this would be to trial a antifungal cream.  Patient will trial Lotrisone cream twice daily to the affected area underneath the skin.  We discussed that if this problem continues to persist following treatment, that he may need to consider undergoing a adult male circumcision procedure that will help decrease the risk for recurrence of the fungal infection.  The patient will trial Lotrisone cream twice daily and follow-up in the office in 3 months to reassess

## 2024-10-24 PROBLEM — Z12.5 SCREENING FOR PROSTATE CANCER: Status: RESOLVED | Noted: 2024-09-24 | Resolved: 2024-10-24

## 2024-11-09 ENCOUNTER — APPOINTMENT (OUTPATIENT)
Dept: LAB | Facility: CLINIC | Age: 59
End: 2024-11-09
Payer: COMMERCIAL

## 2024-11-09 ENCOUNTER — TRANSCRIBE ORDERS (OUTPATIENT)
Dept: LAB | Facility: CLINIC | Age: 59
End: 2024-11-09

## 2024-11-09 DIAGNOSIS — E53.8 DEFICIENCY OF OTHER SPECIFIED B GROUP VITAMINS: ICD-10-CM

## 2024-11-09 DIAGNOSIS — E78.5 HYPERLIPIDEMIA, UNSPECIFIED HYPERLIPIDEMIA TYPE: ICD-10-CM

## 2024-11-09 DIAGNOSIS — E11.9 TYPE 2 DIABETES MELLITUS WITHOUT COMPLICATION, UNSPECIFIED WHETHER LONG TERM INSULIN USE (HCC): ICD-10-CM

## 2024-11-09 DIAGNOSIS — E11.9 TYPE 2 DIABETES MELLITUS WITHOUT COMPLICATION, UNSPECIFIED WHETHER LONG TERM INSULIN USE (HCC): Primary | ICD-10-CM

## 2024-11-09 LAB
ALBUMIN SERPL BCG-MCNC: 4.1 G/DL (ref 3.5–5)
ALP SERPL-CCNC: 61 U/L (ref 34–104)
ALT SERPL W P-5'-P-CCNC: 24 U/L (ref 7–52)
ANION GAP SERPL CALCULATED.3IONS-SCNC: 9 MMOL/L (ref 4–13)
AST SERPL W P-5'-P-CCNC: 23 U/L (ref 13–39)
BILIRUB SERPL-MCNC: 0.54 MG/DL (ref 0.2–1)
BUN SERPL-MCNC: 16 MG/DL (ref 5–25)
CALCIUM SERPL-MCNC: 9.1 MG/DL (ref 8.4–10.2)
CHLORIDE SERPL-SCNC: 101 MMOL/L (ref 96–108)
CHOLEST SERPL-MCNC: 227 MG/DL
CO2 SERPL-SCNC: 28 MMOL/L (ref 21–32)
CREAT SERPL-MCNC: 0.74 MG/DL (ref 0.6–1.3)
ERYTHROCYTE [DISTWIDTH] IN BLOOD BY AUTOMATED COUNT: 14 % (ref 11.6–15.1)
EST. AVERAGE GLUCOSE BLD GHB EST-MCNC: 146 MG/DL
GFR SERPL CREATININE-BSD FRML MDRD: 100 ML/MIN/1.73SQ M
GLUCOSE P FAST SERPL-MCNC: 123 MG/DL (ref 65–99)
HBA1C MFR BLD: 6.7 %
HCT VFR BLD AUTO: 44 % (ref 36.5–49.3)
HDLC SERPL-MCNC: 50 MG/DL
HGB BLD-MCNC: 14.8 G/DL (ref 12–17)
LDLC SERPL CALC-MCNC: 159 MG/DL (ref 0–100)
MCH RBC QN AUTO: 29.5 PG (ref 26.8–34.3)
MCHC RBC AUTO-ENTMCNC: 33.6 G/DL (ref 31.4–37.4)
MCV RBC AUTO: 88 FL (ref 82–98)
NONHDLC SERPL-MCNC: 177 MG/DL
PLATELET # BLD AUTO: 210 THOUSANDS/UL (ref 149–390)
PMV BLD AUTO: 9.6 FL (ref 8.9–12.7)
POTASSIUM SERPL-SCNC: 4.3 MMOL/L (ref 3.5–5.3)
PROT SERPL-MCNC: 7.1 G/DL (ref 6.4–8.4)
RBC # BLD AUTO: 5.02 MILLION/UL (ref 3.88–5.62)
SODIUM SERPL-SCNC: 138 MMOL/L (ref 135–147)
TRIGL SERPL-MCNC: 88 MG/DL
VIT B12 SERPL-MCNC: 456 PG/ML (ref 180–914)
WBC # BLD AUTO: 5.52 THOUSAND/UL (ref 4.31–10.16)

## 2024-11-09 PROCEDURE — 36415 COLL VENOUS BLD VENIPUNCTURE: CPT

## 2024-11-09 PROCEDURE — 83918 ORGANIC ACIDS TOTAL QUANT: CPT

## 2024-11-09 PROCEDURE — 85027 COMPLETE CBC AUTOMATED: CPT

## 2024-11-09 PROCEDURE — 82607 VITAMIN B-12: CPT

## 2024-11-09 PROCEDURE — 80061 LIPID PANEL: CPT

## 2024-11-09 PROCEDURE — 80053 COMPREHEN METABOLIC PANEL: CPT

## 2024-11-09 PROCEDURE — 83036 HEMOGLOBIN GLYCOSYLATED A1C: CPT

## 2024-11-13 LAB — METHYLMALONATE SERPL-SCNC: 99 NMOL/L (ref 0–378)

## 2024-12-23 ENCOUNTER — OFFICE VISIT (OUTPATIENT)
Dept: UROLOGY | Facility: AMBULATORY SURGERY CENTER | Age: 59
End: 2024-12-23

## 2024-12-23 VITALS
SYSTOLIC BLOOD PRESSURE: 124 MMHG | HEART RATE: 81 BPM | DIASTOLIC BLOOD PRESSURE: 60 MMHG | HEIGHT: 67 IN | OXYGEN SATURATION: 94 % | WEIGHT: 204 LBS | BODY MASS INDEX: 32.02 KG/M2

## 2024-12-23 DIAGNOSIS — Z12.5 SCREENING FOR PROSTATE CANCER: ICD-10-CM

## 2024-12-23 DIAGNOSIS — N48.1 BALANITIS: Primary | ICD-10-CM

## 2024-12-23 DIAGNOSIS — N47.1 PHIMOSIS: ICD-10-CM

## 2024-12-23 RX ORDER — CLOTRIMAZOLE 1 %
CREAM (GRAM) TOPICAL 2 TIMES DAILY
Qty: 28 G | Refills: 1 | Status: SHIPPED | OUTPATIENT
Start: 2024-12-23

## 2024-12-23 RX ORDER — ATORVASTATIN CALCIUM 20 MG/1
1 TABLET, FILM COATED ORAL DAILY
COMMUNITY
Start: 2024-11-21

## 2024-12-23 RX ORDER — CEFAZOLIN SODIUM 2 G/50ML
2000 SOLUTION INTRAVENOUS ONCE
OUTPATIENT
Start: 2024-12-23 | End: 2024-12-23

## 2024-12-23 NOTE — ASSESSMENT & PLAN NOTE
Patient utilize Lotrisone cream with significant improvement in regards to his balanitis, but continues to experience tightness from his foreskin during intercourse as well as dryness and cracking of the foreskin.  Patient does not experience any phimosis while his penis is flaccid  We discussed trialing a separate cream such as Lotrimin for continued relief.  Additionally, we discussed undergoing a male adult circumcision procedure.  The procedure was explained in depth as well as the risks involved.  Patient would like to proceed with scheduling a circumcision procedure and consent was obtained in the office today.  Patient will trial Lotrimin cream and be scheduled for a male adult circumcision procedure.  The patient will need to return to the office 2 weeks after the procedure for postoperative check.

## 2024-12-23 NOTE — PROGRESS NOTES
12/23/2024      Assessment and Plan    59 y.o. male managed by our office    Phimosis  Patient utilize Lotrisone cream with significant improvement in regards to his balanitis, but continues to experience tightness from his foreskin during intercourse as well as dryness and cracking of the foreskin.  Patient does not experience any phimosis while his penis is flaccid  We discussed trialing a separate cream such as Lotrimin for continued relief.  Additionally, we discussed undergoing a male adult circumcision procedure.  The procedure was explained in depth as well as the risks involved.  Patient would like to proceed with scheduling a circumcision procedure and consent was obtained in the office today.  Patient will trial Lotrimin cream and be scheduled for a male adult circumcision procedure.  The patient will need to return to the office 2 weeks after the procedure for postoperative check.    Screening for prostate cancer  Patient denies a family history of prostate cancer  Patient's most recent PSA was performed 9/17/2024 and found to be 0.975.  This is the patient's only PSA value, so we are unable to trend PSA values but the value is overall stable for given the patient's age.  LANA performed at our last office visit was noted to be palpably benign.  We discussed that the patient should repeat a PSA 1 year from his last and follow-up in the office at that time to undergo LANA.            History of Present Illness  Saw Bernal is a 59 y.o. male here for evaluation of prostate cancer screening and balanitis. Patient denies a family history of prostate cancer, kidney cancer, and bladder cancer.  Patient's most recent PSA was performed 9/17/2024 and found to be 0.975.  At our last office visit, the patient noted that he has been having dryness, irritation, and an itching sensation underneath his foreskin.  Patient notes that this started occurring after intercourse with his significant other.  The patient  "was initiated on Lotrisone cream twice daily for treatment of his balanitis infection at our last office visit, and the patient returns today for symptom reassessment.  Today, the patient reports that after utilizing the Lotrisone cream the irritation and itching sensation underneath his foreskin resolved.  Additionally, he notes that the white patchy areas on the skin have started to improve.  However, he notes that he continues to experience dryness, cracking of the foreskin, and pain with intercourse as well as a tightness from the skin while having an erection.  The patient would like to proceed with undergoing a circumcision procedure at this time.  Patient last underwent hemoglobin A1c testing on 11/9/2024 and was found to be mildly elevated a value of 6.7.        Review of Systems   Constitutional:  Negative for chills and fever.   HENT:  Negative for ear pain and sore throat.    Eyes:  Negative for pain and visual disturbance.   Respiratory:  Negative for cough and shortness of breath.    Cardiovascular:  Negative for chest pain and palpitations.   Gastrointestinal:  Negative for abdominal pain and vomiting.   Genitourinary:  Positive for penile pain (Tight foreskin while having an erection). Negative for decreased urine volume, difficulty urinating, dysuria, flank pain, frequency, hematuria and urgency.   Musculoskeletal:  Negative for arthralgias and back pain.   Skin:  Negative for color change and rash.   Neurological:  Negative for seizures and syncope.   All other systems reviewed and are negative.               Vitals  Vitals:    12/23/24 1036   BP: 124/60   BP Location: Left arm   Patient Position: Sitting   Cuff Size: Standard   Pulse: 81   SpO2: 94%   Weight: 92.5 kg (204 lb)   Height: 5' 7\" (1.702 m)       Physical Exam  Vitals reviewed.   Constitutional:       General: He is not in acute distress.     Appearance: Normal appearance. He is not ill-appearing.   HENT:      Head: Normocephalic and " atraumatic.      Nose: Nose normal.   Eyes:      General: No scleral icterus.  Pulmonary:      Effort: No respiratory distress.   Abdominal:      General: Abdomen is flat. There is no distension.      Palpations: Abdomen is soft.      Tenderness: There is no abdominal tenderness.   Musculoskeletal:         General: Normal range of motion.      Cervical back: Normal range of motion.   Skin:     General: Skin is warm.      Coloration: Skin is not jaundiced.   Neurological:      Mental Status: He is alert and oriented to person, place, and time.      Gait: Gait normal.   Psychiatric:         Mood and Affect: Mood normal.         Behavior: Behavior normal.           Past History  Past Medical History:   Diagnosis Date    Anxiety     Arthritis     Depression     Diabetes mellitus (HCC)     NIDDM    Hyperlipidemia     Lumbar disc disorder     left leg and foot  numbness    Meniscus tear     Other specified joint disorders, right knee     Seasonal allergies     Trigger finger, left index finger     and right     Social History     Socioeconomic History    Marital status: /Civil Union     Spouse name: None    Number of children: None    Years of education: None    Highest education level: None   Occupational History    Occupation: [employer]   Tobacco Use    Smoking status: Never    Smokeless tobacco: Never   Vaping Use    Vaping status: Never Used   Substance and Sexual Activity    Alcohol use: Yes     Comment: few x year    Drug use: No    Sexual activity: Never   Other Topics Concern    None   Social History Narrative    None     Social Drivers of Health     Financial Resource Strain: Not on file   Food Insecurity: Not on file   Transportation Needs: Not on file   Physical Activity: Not on file   Stress: Not on file   Social Connections: Not on file   Intimate Partner Violence: Not on file   Housing Stability: Not on file     Social History     Tobacco Use   Smoking Status Never   Smokeless Tobacco Never      Family History   Problem Relation Age of Onset    Diabetes Mother     Kidney disease Mother     Diabetes Father        The following portions of the patient's history were reviewed and updated as appropriate: allergies, current medications, past medical history, past social history, past surgical history and problem list.    Results  No results found for this or any previous visit (from the past hour).]  Lab Results   Component Value Date    PSA 0.975 09/17/2024     Lab Results   Component Value Date    GLUCOSE 109 08/03/2015    CALCIUM 9.1 11/09/2024     11/06/2017    K 4.3 11/09/2024    CO2 28 11/09/2024     11/09/2024    BUN 16 11/09/2024    CREATININE 0.74 11/09/2024     Lab Results   Component Value Date    WBC 5.52 11/09/2024    HGB 14.8 11/09/2024    HCT 44.0 11/09/2024    MCV 88 11/09/2024     11/09/2024

## 2024-12-23 NOTE — ASSESSMENT & PLAN NOTE
Patient denies a family history of prostate cancer  Patient's most recent PSA was performed 9/17/2024 and found to be 0.975.  This is the patient's only PSA value, so we are unable to trend PSA values but the value is overall stable for given the patient's age.  LANA performed at our last office visit was noted to be palpably benign.  We discussed that the patient should repeat a PSA 1 year from his last and follow-up in the office at that time to undergo LANA.

## 2024-12-27 ENCOUNTER — OFFICE VISIT (OUTPATIENT)
Dept: DENTISTRY | Facility: CLINIC | Age: 59
End: 2024-12-27

## 2024-12-27 VITALS — DIASTOLIC BLOOD PRESSURE: 79 MMHG | SYSTOLIC BLOOD PRESSURE: 118 MMHG | HEART RATE: 81 BPM

## 2024-12-27 DIAGNOSIS — Z46.3 NEED FOR FULL COVERAGE DENTAL CROWN: Primary | ICD-10-CM

## 2024-12-27 PROCEDURE — D0140 LIMITED ORAL EVALUATION - PROBLEM FOCUSED: HCPCS

## 2024-12-27 PROCEDURE — D0220 INTRAORAL - PERIAPICAL FIRST RADIOGRAPHIC IMAGE: HCPCS

## 2024-12-27 NOTE — PROGRESS NOTES
Procedure Details  31  - INTRAORAL - PERIAPICAL FIRST RADIOGRAPHIC IMAGE   - ASSESSMENT OF A PATIENT     Assessment of Patient      Pt Saw Bernal presents w/ self to [place] originally for limited exam. PMH reviewed, no changes. ASA II.    CC: I want to get my tooth filling done (pointing to #31)    Pt reports discomfort but no pain on #31. The tooth was treatment planned for core build up + crown on 2024. Pt was last seen in [place] on 2024 for a periodic exam.     Radiographs taken: 1 PA of #31    Billing informed us that pt's pre-authorization for the core build up and crown on #31 has  and must be re-sent. Explained to pt that we must hear back from the insurance again to start the procedures despite him remembering that they denied it last time. Pt is willing to pay this time even if they deny the procedures again.    NV: #31 core build up + crown once pre-auth comes back.    Attendings: Dr. Vaughan was present in the clinic.

## 2024-12-27 NOTE — DENTAL PROCEDURE DETAILS
Assessment of Patient      Pt Saw Bernal presents w/ self to [place] originally for limited exam. PMH reviewed, no changes. ASA II.    CC: I want to get my tooth filling done (pointing to #31)    Pt reports discomfort but no pain on #31. The tooth was treatment planned for core build up + crown on 2024. Pt was last seen in [place] on 2024 for a periodic exam.     Radiographs taken: 1 PA of #31    Billing informed us that pt's pre-authorization for the core build up and crown on #31 has  and must be re-sent. Explained to pt that we must hear back from the insurance again to start the procedures despite him remembering that they denied it last time. Pt is willing to pay this time even if they deny the procedures again.    NV: #31 core build up + crown once pre-auth comes back.    Attendings: Dr. Vaughan was present in the clinic.

## 2025-01-07 ENCOUNTER — OFFICE VISIT (OUTPATIENT)
Dept: DENTISTRY | Facility: CLINIC | Age: 60
End: 2025-01-07

## 2025-01-07 ENCOUNTER — PREP FOR PROCEDURE (OUTPATIENT)
Dept: UROLOGY | Facility: AMBULATORY SURGERY CENTER | Age: 60
End: 2025-01-07

## 2025-01-07 ENCOUNTER — TELEPHONE (OUTPATIENT)
Dept: UROLOGY | Facility: AMBULATORY SURGERY CENTER | Age: 60
End: 2025-01-07

## 2025-01-07 DIAGNOSIS — K08.50 DEFECTIVE DENTAL RESTORATION: Primary | ICD-10-CM

## 2025-01-07 DIAGNOSIS — Z01.812 PRE-OPERATIVE LABORATORY EXAMINATION: ICD-10-CM

## 2025-01-07 DIAGNOSIS — R39.89 SUSPECTED UTI: ICD-10-CM

## 2025-01-07 DIAGNOSIS — Z01.810 PRE-OPERATIVE CARDIOVASCULAR EXAMINATION: ICD-10-CM

## 2025-01-07 DIAGNOSIS — N48.1 BALANITIS: Primary | ICD-10-CM

## 2025-01-07 DIAGNOSIS — N47.1 PHIMOSIS: ICD-10-CM

## 2025-01-07 PROCEDURE — D2391 RESIN-BASED COMPOSITE - 1 SURFACE, POSTERIOR: HCPCS

## 2025-01-07 NOTE — TELEPHONE ENCOUNTER
Spoke with patient and confirmed surgery date of  2/28/25  Type of surgery: Circumcision  Operating physician:Dr. De La Vega  Location of surgery: [place] OR    Verbally went over prep with patient on 1/7/25  NPO  Bowel prep? No  Hospital calls afternoon prior with arrival time (calls Friday afternoon for Monday surgery)  Patient needs ride to and from surgery   outpatient  Pre-op testing to be done 2 weeks prior to surgery. All testing can be done as a walk-in. EKG can only be done as a walk-in at any [facility].  CBC, BMP, UCX, EKG  Blood thinners:   None  Clearances needed: None    Mailed to patient    Copy of packet scanned into Media  Labs in packet and in electronic record   Soap prep in packet  post-op in packet     Consent: in media

## 2025-01-07 NOTE — TELEPHONE ENCOUNTER
Spoke with patient and rescheduled surgery for 2/21 at the [facility]. All pre-op requirements were reviewed. Updated packet to be mailed to patient,

## 2025-01-07 NOTE — DENTAL PROCEDURE DETAILS
Patient presents for a dental restoration and verbally consents for treatment:  Reviewed health history-  Pt is ASA type II  Treatment consents signed: Yes  Perio: Healthy  Pain Scale: 0  Caries Assessment: Medium    Radiographs: Films are current  Oral Hygiene instruction reviewed and given  Hygiene recall visits recommended to the patient    Pt was told by  that his insurance will cover #31 crown but not the core build up. #31 has had two fillings dislodged over the course of 14 years (none were placed at [facility]).     Pulp testing showed N response for cold, percussion, and palpation for teeth #30, 31, 32.     After discussion w/ pt, decided to just fill #31-O w/ composite resin w/ added retention grooves for now. If this resin dislodges at any point, pt understands a crown will be absolutely necessary.     Procedure details:  Tooth #31  Dental Anesthesia:  2 carpules 2% Lidocaine w/ 1:100k epi (IANB right side)  Isolation achieved w/ DryShield, cotton rolls and high speed suction.  Three retention grooves added at line angles of prep using high speed handpiece.   Round bur on slow speed used to roughen surface of prep.  Hemaseal and Cide Desensitizer w/ 4% CHX applied to prep w/ microbrush and dried to disinfect.  Limelight applied to deepest part of prep and cured.  Etched and rinsed. Bonded and cured. Packable resin placed and cured.  Shade: Shade A2  Occlusion verified w/ articulating paper.    Pt aware that he needs to update us if #31 feels symptomatic or if the filling falls out.    Prognosis is Good..   Referrals Needed: No  NV: 6 month recall/prophy

## 2025-02-25 ENCOUNTER — APPOINTMENT (OUTPATIENT)
Dept: LAB | Facility: CLINIC | Age: 60
End: 2025-02-25
Payer: COMMERCIAL

## 2025-02-25 DIAGNOSIS — N48.1 BALANITIS: ICD-10-CM

## 2025-02-25 DIAGNOSIS — E78.5 HYPERLIPIDEMIA, UNSPECIFIED HYPERLIPIDEMIA TYPE: ICD-10-CM

## 2025-02-25 DIAGNOSIS — Z01.812 PRE-OPERATIVE LABORATORY EXAMINATION: ICD-10-CM

## 2025-02-25 DIAGNOSIS — E11.9 TYPE 2 DIABETES MELLITUS WITHOUT COMPLICATION, UNSPECIFIED WHETHER LONG TERM INSULIN USE (HCC): ICD-10-CM

## 2025-02-25 DIAGNOSIS — R39.89 SUSPECTED UTI: ICD-10-CM

## 2025-02-25 DIAGNOSIS — N47.1 PHIMOSIS: ICD-10-CM

## 2025-02-25 DIAGNOSIS — E53.8 VITAMIN B12 DEFICIENCY (NON ANEMIC): ICD-10-CM

## 2025-02-25 LAB
CHOLEST SERPL-MCNC: 236 MG/DL (ref ?–200)
CREAT UR-MCNC: 36.9 MG/DL
EST. AVERAGE GLUCOSE BLD GHB EST-MCNC: 143 MG/DL
HBA1C MFR BLD: 6.6 %
HDLC SERPL-MCNC: 53 MG/DL
LDLC SERPL CALC-MCNC: 161 MG/DL (ref 0–100)
MICROALBUMIN UR-MCNC: <7 MG/L
NONHDLC SERPL-MCNC: 183 MG/DL
TRIGL SERPL-MCNC: 111 MG/DL (ref ?–150)
VIT B12 SERPL-MCNC: 282 PG/ML (ref 180–914)

## 2025-02-25 PROCEDURE — 82607 VITAMIN B-12: CPT

## 2025-02-25 PROCEDURE — 82570 ASSAY OF URINE CREATININE: CPT

## 2025-02-25 PROCEDURE — 83036 HEMOGLOBIN GLYCOSYLATED A1C: CPT

## 2025-02-25 PROCEDURE — 36415 COLL VENOUS BLD VENIPUNCTURE: CPT

## 2025-02-25 PROCEDURE — 82043 UR ALBUMIN QUANTITATIVE: CPT

## 2025-02-25 PROCEDURE — 83918 ORGANIC ACIDS TOTAL QUANT: CPT

## 2025-02-25 PROCEDURE — 80061 LIPID PANEL: CPT

## 2025-02-28 LAB — METHYLMALONATE SERPL-SCNC: 205 NMOL/L (ref 0–378)

## 2025-03-13 ENCOUNTER — OFFICE VISIT (OUTPATIENT)
Dept: DENTISTRY | Facility: CLINIC | Age: 60
End: 2025-03-13

## 2025-03-13 VITALS — HEART RATE: 80 BPM | SYSTOLIC BLOOD PRESSURE: 124 MMHG | DIASTOLIC BLOOD PRESSURE: 81 MMHG

## 2025-03-13 DIAGNOSIS — Z01.21 ENCOUNTER FOR DENTAL EXAMINATION AND CLEANING WITH ABNORMAL FINDINGS: Primary | ICD-10-CM

## 2025-03-13 PROCEDURE — D0120 PERIODIC ORAL EVALUATION - ESTABLISHED PATIENT: HCPCS

## 2025-03-13 PROCEDURE — D1330 ORAL HYGIENE INSTRUCTIONS: HCPCS

## 2025-03-13 RX ORDER — KETOTIFEN FUMARATE 0.35 MG/ML
SOLUTION/ DROPS OPHTHALMIC
COMMUNITY
Start: 2025-02-23

## 2025-03-13 RX ORDER — CYANOCOBALAMIN 1000 UG/ML
INJECTION, SOLUTION INTRAMUSCULAR; SUBCUTANEOUS
COMMUNITY
Start: 2025-02-13

## 2025-03-13 NOTE — PROGRESS NOTES
PERIODIC EXAM,PERIODONTAL CHARTING   REVIEWED MED HX: meds, allergies, health changes reviewed in [facility]. All consents signed.  CHIEF CONCERN: no dental pain or concerns  PAIN SCALE:  0  ASA CLASS:  II  PLAQUE:  moderate  CALCULUS:  heavy  BLEEDING:heavy  STAIN :   moderate      PERIO: Full perio charting was completed and explained to patient that he would benefit from SRPs in all quads.  3B     Oral Hygiene Instruction: Brushing Minimum 2x daily for 2 minutes, daily flossing, Water pik, Interproximal Brush, and Electric toothbrush    Visual and Tactile Intraoral/ Extraoral evaluation: Oral and Oropharyngeal cancer evaluation. No findings     Dr. Zenon Collins   Reviewed with patient clinical and radiographic findings and patient verbalized understanding. All questions and concerns addressed.     REFERRALS: none    CARIES FINDINGS: no decay noted       TREATMENT  PLAN :   NV: SRPs 4+ in all quads    Last BWX: 8/7/2024  Last  FMX : 6/2/2022

## 2025-04-11 ENCOUNTER — OFFICE VISIT (OUTPATIENT)
Dept: DENTISTRY | Facility: CLINIC | Age: 60
End: 2025-04-11

## 2025-04-11 DIAGNOSIS — K05.6 PERIODONTAL DISEASE: Primary | ICD-10-CM

## 2025-04-11 PROCEDURE — D4341 PERIODONTAL SCALING AND ROOT PLANING - 4 OR MORE TEETH PER QUADRANT: HCPCS

## 2025-04-11 NOTE — PROGRESS NOTES
Procedure Details  LR  - PERIODONTAL SCALING AND ROOT PLANING - 4 OR MORE TEETH PER QUADRANT    SCALE AND RP LR   Local anesthesia administered by     1 carpule/s given - 20% Bencocaine topical used with Septo. 3%  CHIEF CONCERN: none   PAIN SCALE: 0  ASA CLASS: ASA 2 - Patient with mild systemic disease with no functional limitations    Hand scaled and used cavitron    Oral Hygiene Instruction:  recommended brushing 2 x daily for 2 minutes MIN, recommended flossing daily, reviewed dietary precautions. Post op sc/rp instructions placed in AVS, printed and handed/ reviewed with patient.     Soft tissue exam:  soft tissue exam was normal  ExtraOral exam:   Extraoral exam was normal    REFERRALS: no referrals provided         NEXT VISIT:   --->sc/rp UR

## 2025-04-11 NOTE — DENTAL PROCEDURE DETAILS
SCALE AND RP LR   Local anesthesia administered by     1 carpule/s given - 20% Bencocaine topical used with Septo. 3%  CHIEF CONCERN: none   PAIN SCALE: 0  ASA CLASS: ASA 2 - Patient with mild systemic disease with no functional limitations    Hand scaled and used cavitron    Oral Hygiene Instruction:  recommended brushing 2 x daily for 2 minutes MIN, recommended flossing daily, reviewed dietary precautions. Post op sc/rp instructions placed in AVS, printed and handed/ reviewed with patient.     Soft tissue exam:  soft tissue exam was normal  ExtraOral exam:   Extraoral exam was normal    REFERRALS: no referrals provided         NEXT VISIT:   --->sc/rp UR

## 2025-07-08 NOTE — PROGRESS NOTES
Composite Filling    [facility] Uyen Wallace presents for composite filling on teeth 11 and 12  PMH reviewed, no changes  Discussed with patient that these are abfractions and he needs to get occlusal guard and get an electric tooth brush  Applied topical benzocaine, administered 1 carps 2% lido 1:100k epi via local infiltration  Prepped teeth #11 and 12 class V with larissa bur on high speed  Bevel was created  Isolated teeth with size 0 cords and with cotton rolls and dri-angles    Etch with 37% H2PO4, rinse, dry  Applied Adhese with 20 second scrub once, gentle air dry and light cured for 10s  Restored with Tetric bulk aleah shade A4 and light cured  Refined with finishing burs, polished with enhance point  Pt left satisfied      Nv: resins
6 (moderate pain)

## (undated) DEVICE — NEEDLE HYPO 22G X 1-1/2 IN

## (undated) DEVICE — PUDDLE VAC

## (undated) DEVICE — 3M™ STERI-STRIP™ REINFORCED ADHESIVE SKIN CLOSURES, R1547, 1/2 IN X 4 IN (12 MM X 100 MM), 6 STRIPS/ENVELOPE: Brand: 3M™ STERI-STRIP™

## (undated) DEVICE — ABDOMINAL PAD: Brand: DERMACEA

## (undated) DEVICE — SUT MONOCRYL 4-0 PS-2 18 IN Y496G

## (undated) DEVICE — PADDING CAST 4 IN  COTTON STRL

## (undated) DEVICE — 3M™ STERI-STRIP™ REINFORCED ADHESIVE SKIN CLOSURES, R1546, 1/4 IN X 4 IN (6 MM X 100 MM), 10 STRIPS/ENVELOPE: Brand: 3M™ STERI-STRIP™

## (undated) DEVICE — 3M™ STERI-STRIP™ REINFORCED ADHESIVE SKIN CLOSURES, R1542, 1/4 IN X 1-1/2 IN (6 MM X 38 MM), 6 STRIPS/ENVELOPE: Brand: 3M™ STERI-STRIP™

## (undated) DEVICE — BLADE SHAVER DISSECTOR 3.5MM 13CM COOLCUT

## (undated) DEVICE — CUFF TOURNIQUET 18 X 5.5 IN QUICK CONNECT 2BLA

## (undated) DEVICE — SPONGE PVP SCRUB WING STERILE

## (undated) DEVICE — ACE WRAP 3 IN STERILE

## (undated) DEVICE — GAUZE SPONGES,16 PLY: Brand: CURITY

## (undated) DEVICE — PADDING CAST 6IN COTTON STRL

## (undated) DEVICE — NEEDLE 25G X 1 1/2

## (undated) DEVICE — GLOVE INDICATOR PI UNDERGLOVE SZ 8 BLUE

## (undated) DEVICE — BLADE SHAVER DISSECTOR 4MM 13CM COOLCUT

## (undated) DEVICE — SUT MONOCRYL 2-0 SH 27 IN Y417H

## (undated) DEVICE — CYSTO TUBING TUR Y IRRIGATION

## (undated) DEVICE — 3M™ STERI-DRAPE™ U-DRAPE 1015: Brand: STERI-DRAPE™

## (undated) DEVICE — IMPERVIOUS STOCKINETTE: Brand: DEROYAL

## (undated) DEVICE — INTENDED FOR TISSUE SEPARATION, AND OTHER PROCEDURES THAT REQUIRE A SHARP SURGICAL BLADE TO PUNCTURE OR CUT.: Brand: BARD-PARKER ® CARBON RIB-BACK BLADES

## (undated) DEVICE — COBAN 6 IN STERILE

## (undated) DEVICE — INTENDED FOR TISSUE SEPARATION, AND OTHER PROCEDURES THAT REQUIRE A SHARP SURGICAL BLADE TO PUNCTURE OR CUT.: Brand: BARD-PARKER SAFETY BLADES SIZE 15, STERILE

## (undated) DEVICE — GLOVE SRG BIOGEL 8

## (undated) DEVICE — SCD SEQUENTIAL COMPRESSION COMFORT SLEEVE MEDIUM KNEE LENGTH: Brand: KENDALL SCD

## (undated) DEVICE — CHLORAPREP HI-LITE 26ML ORANGE

## (undated) DEVICE — TUBING SUCTION 5MM X 12 FT

## (undated) DEVICE — PACK UNIVERSAL ARTHRSCOPY PBDS

## (undated) DEVICE — SYRINGE 20ML LL

## (undated) DEVICE — SUT VICRYL 3-0 SH 27 IN J416H

## (undated) DEVICE — GLOVE SRG BIOGEL 7.5

## (undated) DEVICE — DISPOSABLE EQUIPMENT COVER: Brand: SMALL TOWEL DRAPE

## (undated) DEVICE — ADHESIVE SKN CLSR HISTOACRYL FLEX 0.5ML LF

## (undated) DEVICE — PACK PLASTIC HAND PBDS

## (undated) DEVICE — ACE WRAP 6 IN UNSTERILE

## (undated) DEVICE — GLOVE INDICATOR PI UNDERGLOVE SZ 8.5 BLUE